# Patient Record
Sex: FEMALE | Race: WHITE | URBAN - METROPOLITAN AREA
[De-identification: names, ages, dates, MRNs, and addresses within clinical notes are randomized per-mention and may not be internally consistent; named-entity substitution may affect disease eponyms.]

---

## 2017-02-01 RX ORDER — MONTELUKAST SODIUM 10 MG/1
TABLET ORAL
Qty: 90 TAB | Refills: 1 | Status: SHIPPED | OUTPATIENT
Start: 2017-02-01 | End: 2017-08-01 | Stop reason: SDUPTHER

## 2017-02-06 NOTE — PROGRESS NOTES
PRE-VISIT PLANNING:     Future Appointments  Date Time Provider Lennie Jenseni   2017 11:00 AM Colton Dudley MD BSMA GEETA Jacome is a patient of Colton Dudley MD who is scheduled for an office visit with Colton Dudley MD on 2017 for follow up. Encounter opened prior to visit to complete pre-visit planning, update and review pertinent sections in the chart. Last office visit with PCP was 10/31/2016. Rooming Nurse Items:  -- Release for last eye exam note (glaucoma screening)    Pending items for provider to review with patient:  Health Maintenance Due   Topic Date Due    GLAUCOMA SCREENING Q2Y  2017          Internal Medicine/Primary Care  Progress Note  [employer] [facility] [address], [facility] [address] [place] [address]  Phone [phone]  Fax [phone]    Date of Service:  2017  Patient's Name: Malcom Quan   Patient's :  1945     History, Discussion & Plan     Chief Complaint   Patient presents with    Medication Evaluation        Malcom Quan is a 80 y.o. female patient who was seen today for follow up visit. She  has a past medical history of Anemia, iron deficiency secondary to bowel resection; Cervical spondylosis without myelopathy (2014); Chronic pain syndrome (2014); Collagenous colitis; Constipation; DDD (degenerative disc disease), cervical; Depression; Essential hypertension; Gastroparesis; Genital herpes (); GERD (gastroesophageal reflux disease); H/O resection of small bowel (); breast cancer; Hyperlipidemia; Osteoarthritis; Osteopenia; and Reactive airway disease. BP is well-controlled on Lisinopril 10 mg daily. Completed labs in 2016 showing good control of lipids on Zocor 10 mg nightly. WBC was slightly high in October, though within normal range.       Followed by Dr. James Weeks (pain mgmt) and Dr. Alex Augustin for iron deficiency anemia, gets periodic iron infusions. Ferritin was low. Just had 2 infusions in January, did have fatigue later that day after first infusion. Two weeks ago had second infusion. Is feeling better after past few days, no longer needing afternoon naps. Saw great carl yesterday. Has multiple concerns today. Two weeks ago also had left knee and right shoulder injections with Dr. Viktor Stone office. Had some improvement in right shoulder, but not completely relieved. Still having some intermittent left knee pain (superior aspect of patella). Has hx of meniscal tears. Has elastic knee brace. Does respond to rest.  Would like to see ortho. C/o worsening tremors in both hands over the past few months, thinks tremor started within the last year. Intermittent, but not only intentional and not only at rest.  Sometimes impacts texting and writing. Also c/o headache \"like a helmet\" intermittent for a few years, varies in intensity. Dr. Carmela Adair didn't think it was related to her neck. Had MRI head several years ago which did not show anything causative, had eval with Dr. Kimmy Avelar (neuro). Some response to Aleve. Had good response to pain patch from Dr. Dasha Ortiz for a few months, but now it no longer seems to be helping. States she had eye exam recently with Dr. Juan Nguyen, received card in mail stating it's time to schedule yearly visit. Dr. Juan Nguyen did not think vision was causing headache. No associated symptoms with HA. Ice pack helps. Has had itching on back, is overdue for follow up with dermatologist.      No longer seeing Dr. Jimi Trejo, would like refills of Valtrex through PCP office - has several refills left.       Review of Systems (positives in bold)   CONST:   fevers, chills, rigors, malaise, night sweats, fatigue    unintentional weight change, appetite change  NEURO:   headaches, auras, vision changes, seizures,     dizziness, lightheadeness, orthostasis, loss of consciousness, confusion    numbness/tingling/sensory change, focal weakness, new gait difficulty/imbalance  HEENT:  itchy eyes, runny eyes, red eyes, eye watering or discharge,     vision changes, light sensitivity, double vision    ear pain, ear pressure/popping, ear discharge/drainage, hearing change    sneezing, runny nose, nasal congestion, postnasal drip,   CV:      chest pain, palpitations, chest wall pain, orthopnea, PND, edema  PULM:  SOB, wheezing, cough, sputum production, hemoptysis  GI:             nausea, vomiting, dry heaves, abdominal pain,     diarrhea, constipation, greasy stools, blood in stool, pale stools  :       dysuria, frequency, urgency, hematuria, incontinence, flank pain  MS:      focal muscle pain, myalgias, soft tissue swelling,    focal joint pain, diffuse joint aches, joint swelling/redness,     decreased ROM, joint instability, joint crepitus    neck pain, back pain  SKIN:        rashes, itching, vesicles, pustules, drainage, cuts or sores, nonhealing wounds,     acne, rosacea, new or changing skin growths, skin tags, warts      Diagnoses & Orders:   Brittny Armendariz was seen today for follow up. BP well controlled on lisinopril. Referral to Dr. Ophelia Atkins for eval of worsening bilateral hand tremors and follow up on chronic daily headache, doubt migraine, had MRI several years ago when HAs started, also doesn't appear to be cervicogenic. Referral to ortho for multiple joint pains, lack of response to steroid injection right shoulder and left knee. Update CMP today, Follow up in 6 months for 30 min visit, sooner PRN. ICD-10-CM ICD-9-CM    1. Essential hypertension I62 161.2 METABOLIC PANEL, COMPREHENSIVE   2. Reactive airway disease, mild intermittent, uncomplicated N41.50 085.15 METABOLIC PANEL, COMPREHENSIVE   3. Mixed hyperlipidemia O49.0 815.8 METABOLIC PANEL, COMPREHENSIVE   4. Iron deficiency anemia due to chronic blood loss D50.0 280.0    5.  Medication monitoring encounter M73.01 T51.04 METABOLIC PANEL, COMPREHENSIVE   6. Intermittent tremor R25.1 781.0 REFERRAL TO NEUROLOGY   7. Daily headache R51 784.0 REFERRAL TO NEUROLOGY   8. Left hip pain M25.552 719.45 REFERRAL TO ORTHOPEDIC SURGERY   9. Chronic pain of left knee M25.562 719.46 REFERRAL TO ORTHOPEDIC SURGERY    G89.29 338.29    10. Chronic right shoulder pain M25.511 719.41 REFERRAL TO ORTHOPEDIC SURGERY    G89.29 338.29        The patient states understanding/agreement with the treatment plan. All questions were answered. Total visit time today = 37 minutes with greater than half of that time spent in face to face discussion and counseling with patient (or caregiver) regarding patient concerns and symptoms, possible causes, plan for evaluation. Elian De Leon MD - Internal Medicine  2/7/2017, 6:49 PM    Patient Care Team:  Elian De Leon MD as PCP - General (Internal Medicine)  Jeanette Borrego MD as Consulting Provider (Gastroenterology)  Jillian Munoz MD as Consulting Provider (Rheumatology)  Ovidio Joseph MD as Consulting Provider (Orthopedic Surgery)  Kesha Hernández MD as Consulting Provider (Oncology)  Harvey Cuello MD as Consulting Provider (Obstetrics & Gynecology)  Darwin Jones MD as Consulting Provider (Dermatology)  Marge Tobar MD as Consulting Provider (Pain Management)  Ava Chinchilla MD as Consulting Provider (Optometry)      Objective:       VS:    Visit Vitals    /70    Pulse 85    Temp 98.3 °F (36.8 °C) (Oral)    Resp 18    Ht 5' 3\" (1.6 m)    Wt 136 lb 6.4 oz (61.9 kg)    SpO2 100%    BMI 24.16 kg/m2       General:   Age appropriate female, seated comfortably in exam room chair    Appears well, well-nourished, well-groomed, conversant, alert, in no acute distress.      Head:  Normocephalic, atraumatic  Ears:  External ears WNL, no pain with movement of pinna    No TTP of mastoid processes    External auditory canals are clear     TMs WNL bilaterally with no bulging, or erythema, no medial effusions present  Eyes:  EOMI, PERRL, no pain with eye movements    No conjunctival injection, abnormal tearing, discharge, chemosis  Mouth:  MMM      Posterior o/p without erythema, membranes, exudates, ulcerations or petechiae  Nose:  External nares WNL  Neck:  Neck supple with normal ROM for age    No thyromegaly or nodules, no LAD  Cardiovasc:   Regular rate and rhythm, no murmurs, no rubs, no gallops  Pulmonary:   Clear breath sounds bilaterally, good air movement,    No wheezing, no rales, no rhonchi, normal respiratory effort  Abdomen:   Abdomen soft, nontender, nondistended, NABS, no masses  Extremities:   No pitting dependent edema    No tenderness with palpation of calves    Warm and well-perfused at distal extremities  Neuro:   Alert, conversant, following commands, no focal deficits. Gait is narrow based and stable without assistive device  Skin:    No rashes noted.     Psych:  Affect is pleasant and interactive, facies and mood are congruent,     behavior normal and appropriate, thought process linear and logical     Memory appears to be normal throughout interview      Additional History     Past Medical History   Diagnosis Date    Anemia, iron deficiency secondary to bowel resection      Followed by Dr Priti Hernandez, gets iron transfusions PRN, not expected to completely normalize    Cervical spondylosis without myelopathy 1/7/2014     Sees Dr. Lynsey Douglas    Chronic pain syndrome 1/7/2014     Spinal DDD and scoliosis, Sees Dr. Luis Manuel Salamanca colitis      Dr. Vianca Jaimes, using enterocort sparingly    Constipation      Dr. Vianca Jaimes    DDD (degenerative disc disease), cervical      Sees Dr. Brandt Jones hypertension      Well-controlled on meds    Gastroparesis      Resolved, was secondary to morphine    Genital herpes 1988     Pt taking meds    GERD (gastroesophageal reflux disease)      Dr. Vianca Jaimes    H/O resection of small bowel 2011     Secondary to ischemic gut from adhesions    Hx of breast cancer      Had lumpectomy, sees Dr. Toñito Corbett Hyperlipidemia      Well controlled on low dose statin    Osteoarthritis     Osteopenia      Dr. Fernando Ornelas airway disease      Wheezing after exposure to spray , has albuterol PRN     Past Surgical History   Procedure Laterality Date    Hx breast lumpectomy Right september 2007     cancerous    Hx small bowel resection  07/01/2011     Middle 1/3 of small bowel; caused by scar tissue from hysterectomy, gangrenous small bowel    Hx partial hysterectomy      Hx colonoscopy  2/21/2008     Collagenous colitis on biopsy - Dr. Trever Bueno Hx colonoscopy  2/14/2011     Diverticulosis, internal hemorrhoids - Dr. Francoise Cruz     Social History   Substance Use Topics    Smoking status: Never Smoker    Smokeless tobacco: Never Used    Alcohol use No     Current Outpatient Prescriptions   Medication Sig    montelukast (SINGULAIR) 10 mg tablet TAKE 1 TABLET BY MOUTH EVERY DAY    lisinopril (PRINIVIL, ZESTRIL) 10 mg tablet TAKE 1 TABLET BY MOUTH DAILY.  simvastatin (ZOCOR) 10 mg tablet TAKE 1 TABLET BY MOUTH NIGHTLY    gabapentin (NEURONTIN) 300 mg capsule TAKE 1 CAPSULE BY MOUTH 4 TIMES DAILY    PREDNISOLONE PO Take  by mouth.  OTHER     diclofenac (VOLTAREN) 1 % gel Apply  to affected area four (4) times daily.  fentaNYL (DURAGESIC) 25 mcg/hr PATCH 1 Patch by TransDERmal route every seventy-two (72) hours.  oxyCODONE-acetaminophen (PERCOCET) 7.5-325 mg per tablet Take  by mouth every four (4) hours as needed for Pain.  valACYclovir (VALTREX) 1 gram tablet Take  by mouth.  DULoxetine (CYMBALTA) 60 mg capsule Take 1 capsule by mouth daily.  cyclobenzaprine (FLEXERIL) 5 mg tablet Take 5 mg by mouth three (3) times daily as needed for Muscle Spasm(s).  POLYETHYLENE GLYCOL 3350 (MIRALAX PO) Take  by mouth.     etodolac (LODINE) 400 mg tablet Take  by mouth two (2) times daily (with meals).  aspirin delayed-release 81 mg tablet Take  by mouth daily.  multivitamin (ONE A DAY) tablet Take 1 Tab by mouth daily.  BIOTIN PO Take 1,000 mg by mouth daily.  cyanocobalamin (VITAMIN B12) 500 mcg tablet Take 500 mcg by mouth daily.  thiamine (VITAMIN B-1) 100 mg tablet Take  by mouth daily.  LECITHIN PO Take 400 mg by mouth two (2) times a day.  ascorbic acid (VITAMIN C) 1,000 mg tablet Take 1,000 mg by mouth three (3) times daily. No current facility-administered medications for this visit. Allergies   Allergen Reactions    Morphine Other (comments)     Gastroparesis    Neosporin [Benzalkonium Chloride] Rash     Pt reports no allergy    Penicillin G Rash    Sulfa (Sulfonamide Antibiotics) Rash            This document may have been created with the aid of dictation software. In spite of review and editing, text may contain errors, particularly phonetic errors.

## 2017-02-07 ENCOUNTER — OFFICE VISIT (OUTPATIENT)
Dept: FAMILY MEDICINE CLINIC | Age: 72
End: 2017-02-07

## 2017-02-07 ENCOUNTER — HOSPITAL ENCOUNTER (OUTPATIENT)
Dept: LAB | Age: 72
Discharge: HOME | End: 2017-02-07
Payer: MEDICARE

## 2017-02-07 VITALS
BODY MASS INDEX: 24.17 KG/M2 | HEART RATE: 85 BPM | WEIGHT: 136.4 LBS | SYSTOLIC BLOOD PRESSURE: 130 MMHG | HEIGHT: 63 IN | RESPIRATION RATE: 18 BRPM | TEMPERATURE: 98.3 F | OXYGEN SATURATION: 100 % | DIASTOLIC BLOOD PRESSURE: 70 MMHG

## 2017-02-07 DIAGNOSIS — J45.20 REACTIVE AIRWAY DISEASE, MILD INTERMITTENT, UNCOMPLICATED: Chronic | ICD-10-CM

## 2017-02-07 DIAGNOSIS — Z51.81 MEDICATION MONITORING ENCOUNTER: ICD-10-CM

## 2017-02-07 DIAGNOSIS — G89.29 CHRONIC RIGHT SHOULDER PAIN: ICD-10-CM

## 2017-02-07 DIAGNOSIS — R25.1 INTERMITTENT TREMOR: ICD-10-CM

## 2017-02-07 DIAGNOSIS — I10 ESSENTIAL HYPERTENSION: Chronic | ICD-10-CM

## 2017-02-07 DIAGNOSIS — D50.0 IRON DEFICIENCY ANEMIA DUE TO CHRONIC BLOOD LOSS: Chronic | ICD-10-CM

## 2017-02-07 DIAGNOSIS — M25.552 LEFT HIP PAIN: ICD-10-CM

## 2017-02-07 DIAGNOSIS — E78.2 MIXED HYPERLIPIDEMIA: Chronic | ICD-10-CM

## 2017-02-07 DIAGNOSIS — G89.29 CHRONIC PAIN OF LEFT KNEE: ICD-10-CM

## 2017-02-07 DIAGNOSIS — M25.511 CHRONIC RIGHT SHOULDER PAIN: ICD-10-CM

## 2017-02-07 DIAGNOSIS — I10 ESSENTIAL HYPERTENSION: Primary | Chronic | ICD-10-CM

## 2017-02-07 DIAGNOSIS — M25.562 CHRONIC PAIN OF LEFT KNEE: ICD-10-CM

## 2017-02-07 DIAGNOSIS — R51.9 DAILY HEADACHE: ICD-10-CM

## 2017-02-07 LAB
ALBUMIN SERPL BCP-MCNC: 3.7 G/DL (ref 3.4–5)
ALBUMIN/GLOB SERPL: 1.6 {RATIO} (ref 0.8–1.7)
ALP SERPL-CCNC: 73 U/L (ref 45–117)
ALT SERPL-CCNC: 32 U/L (ref 13–56)
ANION GAP BLD CALC-SCNC: 6 MMOL/L (ref 3–18)
AST SERPL W P-5'-P-CCNC: 27 U/L (ref 15–37)
BILIRUB SERPL-MCNC: 0.4 MG/DL (ref 0.2–1)
BUN SERPL-MCNC: 16 MG/DL (ref 7–18)
BUN/CREAT SERPL: 18 (ref 12–20)
CALCIUM SERPL-MCNC: 8.8 MG/DL (ref 8.5–10.1)
CHLORIDE SERPL-SCNC: 103 MMOL/L (ref 100–108)
CO2 SERPL-SCNC: 29 MMOL/L (ref 21–32)
CREAT SERPL-MCNC: 0.9 MG/DL (ref 0.6–1.3)
GLOBULIN SER CALC-MCNC: 2.3 G/DL (ref 2–4)
GLUCOSE SERPL-MCNC: 83 MG/DL (ref 74–99)
POTASSIUM SERPL-SCNC: 4.8 MMOL/L (ref 3.5–5.5)
PROT SERPL-MCNC: 6 G/DL (ref 6.4–8.2)
SODIUM SERPL-SCNC: 138 MMOL/L (ref 136–145)

## 2017-02-07 PROCEDURE — 80053 COMPREHEN METABOLIC PANEL: CPT | Performed by: INTERNAL MEDICINE

## 2017-02-07 PROCEDURE — 36415 COLL VENOUS BLD VENIPUNCTURE: CPT | Performed by: INTERNAL MEDICINE

## 2017-02-07 NOTE — PATIENT INSTRUCTIONS
STAY UP TO DATE WITH YOUR PREVENTIVE HEALTH:     Please review the following recommendations and if you are not sure that your healthcare is up to date, ask your provider at your next scheduled office visit. -- Yearly preventive visits (sometimes called \"physicals\") are recommended for all adults. Medicare and Medicaid do not pay for physicals. Medicare covers a yearly wellness visit that differs in many ways from a traditional physical, but is an opportunity to make sure you are maximizing the preventive services that will be covered by Medicare. Each insurance carrier will have different regulations about what services may be covered, so be sure to talk with your insurance provider before scheduling a visit. -- Colon cancer screening is recommended for all patients 48 and older with either colonoscopy (interval will vary depending on results) or yearly stool testing.      -- Mammogram is recommended every 2 years for women ages 36 to 76. -- Pap smear is recommended every 3-5 years for women aged 24 to 72, unless your cervix has been surgically removed for benign reasons. -- Flu shot is recommended every fall for adults of all ages. -- Prevnar 15 is recommended at the age of 72 for all adults. -- Pneumovax is recommended one year after Prevnar 13 for all adults, but earlier if you have a history of chronic health problems (including diabetes and asthma) or smoking. -- Tetanus boosters are recommended every 10 years for adults of all ages. Medicare and Medicaid do not cover tetanus as a preventive booster, but will pay for patients to receive a booster in the event of certain injuries. INSTRUCTIONS AFTER YOUR VISIT ON 2/7/2017     LAB WORK was ordered for today. Sign in for the lab at the . Results are generally reviewed within 10-14 days. LAB RESULTS can be obtained by logging into your [employer] account.   If you need assistance with accessing your account, you can call the [address] [place] [address] at #[phone]. REFERRALS - If you were referred to a specialist or consultant today, this often needs to be authorized by your insurance company before it can be scheduled. You should be contacted within 2 weeks by the consultant's office to schedule the visit. If you do not hear from the specialist's office in that time frame, please call my office and the staff here can check on the status of your referral.        TESTING RESULTS   -- Lab results may become available for your review on [employer] before your provider has an opportunity to write you a note about results. Review of routine lab results will generally be done in 10-14 days. Please save your inquiries about results until your provider has had time to review them. -- If you have testing done outside of the office, please call to let us know the date and location that your testing was completed. Results can often be obtained faster if an inquiry is run. MEDICATION REFILLS      -- Controlled substance refills (medications that require printed prescriptions for monitoring of use per [facility] guidelines) must be picked up in the office and per medical group policy will require a scheduled office visit with the provider. Calling the after hours answering service to request a controlled substance represents a violation of [facility] controlled substance policy. -- All other medication refills are to be requested by calling your pharmacy during regular office hours. The after hours physician on call is not required to respond to calls about medication refills. It is your responsibility to keep track of when you may be running out of medication and to place refill requests at least 3 business days prior. Ja      Scheduling appointments can be done at the  or by calling [phone] and selecting option #1.       HOLIDAY CLOSURES:     The office is closed on six major holidays each year:  New Year's Day, July 4th, Memorial Day, Labor Day, Thanksgiving, and Reymundo Day. Lab and X-ray services are not available on those days.

## 2017-02-07 NOTE — MR AVS SNAPSHOT
Visit Information Date & Time Provider Department Dept. Phone Encounter #  
 2/7/2017 11:00 AM Zaid Johnson, 3 [facility] [phone] [id number] Follow-up Instructions Return in about 6 months (around 8/7/2017) for 30 min visit. Upcoming Health Maintenance Date Due  
 GLAUCOMA SCREENING Q2Y 1/27/2017 DTaP/Tdap/Td series (1 - Tdap) 10/17/2017* MEDICARE YEARLY EXAM 10/18/2017 BREAST CANCER SCRN MAMMOGRAM 7/28/2018 COLONOSCOPY 1/27/2025 *Topic was postponed. The date shown is not the original due date. Allergies as of 2/7/2017  Review Complete On: 2/7/2017 By: Zaid Johnson MD  
  
 Severity Noted Reaction Type Reactions Morphine Medium 09/03/2014   Side Effect Other (comments) Gastroparesis Neosporin [Benzalkonium Chloride] Medium 09/03/2014   Intolerance Rash Pt reports no allergy Penicillin G  01/07/2014    Rash  
 Sulfa (Sulfonamide Antibiotics)  01/07/2014    Rash Current Immunizations  Reviewed on 10/17/2016 Name Date Influenza High Dose Vaccine PF 9/14/2016, 9/14/2015 Influenza Vaccine 9/15/2010 Pneumococcal Conjugate (PCV-13) 7/29/2015 11:21 AM  
 Pneumococcal Polysaccharide (PPSV-23) 10/17/2016 11:34 AM, 10/1/2008 Not reviewed this visit You Were Diagnosed With   
  
 Codes Comments Essential hypertension    -  Primary ICD-10-CM: I10 
ICD-9-CM: 401.9 Reactive airway disease, mild intermittent, uncomplicated     JKJ-82-WS: J45.20 ICD-9-CM: 493.90 Mixed hyperlipidemia     ICD-10-CM: E78.2 ICD-9-CM: 272.2 Iron deficiency anemia due to chronic blood loss     ICD-10-CM: D50.0 ICD-9-CM: 280.0 Medication monitoring encounter     ICD-10-CM: Z51.81 
ICD-9-CM: V58.83 Intermittent tremor     ICD-10-CM: R25.1 ICD-9-CM: 781.0 Daily headache     ICD-10-CM: R51 ICD-9-CM: 784.0 Left hip pain     ICD-10-CM: M25.552 ICD-9-CM: 719.45   
 Chronic pain of left knee     ICD-10-CM: M25.562, G89.29 ICD-9-CM: 719.46, 338.29 Chronic right shoulder pain     ICD-10-CM: M25.511, G89.29 ICD-9-CM: 719.41, 338.29 Vitals BP Pulse Temp Resp Height(growth percentile) Weight(growth percentile) 130/70 85 98.3 °F (36.8 °C) (Oral) 18 5' 3\" (1.6 m) 136 lb 6.4 oz (61.9 kg) SpO2 BMI OB Status Smoking Status 100% 24.16 kg/m2 Hysterectomy Never Smoker Vitals History BMI and BSA Data Body Mass Index Body Surface Area  
 24.16 kg/m 2 1.66 m 2 Preferred Pharmacy Pharmacy Name Phone [facility][id number]- [address], [address] [place] [address] [phone] Your Updated Medication List  
  
   
This list is accurate as of: 2/7/17 11:46 AM.  Always use your most recent med list.  
  
  
  
  
 aspirin delayed-release 81 mg tablet Take  by mouth daily. BIOTIN PO Take 1,000 mg by mouth daily. cyanocobalamin 500 mcg tablet Commonly known as:  VITAMIN B12 Take 500 mcg by mouth daily. DULoxetine 60 mg capsule Commonly known as:  CYMBALTA Take 1 capsule by mouth daily. etodolac 400 mg tablet Commonly known as:  LODINE Take  by mouth two (2) times daily (with meals). fentaNYL 25 mcg/hr PATCH Commonly known as:  DURAGESIC  
1 Patch by TransDERmal route every seventy-two (72) hours. FLEXERIL 5 mg tablet Generic drug:  cyclobenzaprine Take 5 mg by mouth three (3) times daily as needed for Muscle Spasm(s). gabapentin 300 mg capsule Commonly known as:  NEURONTIN  
TAKE 1 CAPSULE BY MOUTH 4 TIMES DAILY  
  
 LECITHIN PO Take 400 mg by mouth two (2) times a day. lisinopril 10 mg tablet Commonly known as:  PRINIVIL, ZESTRIL  
TAKE 1 TABLET BY MOUTH DAILY. MIRALAX PO Take  by mouth.  
  
 montelukast 10 mg tablet Commonly known as:  SINGULAIR  
TAKE 1 TABLET BY MOUTH EVERY DAY  
  
 multivitamin tablet Commonly known as:  ONE A DAY  
 Take 1 Tab by mouth daily. OTHER PERCOCET 7.5-325 mg per tablet Generic drug:  oxyCODONE-acetaminophen Take  by mouth every four (4) hours as needed for Pain. PREDNISOLONE PO Take  by mouth. simvastatin 10 mg tablet Commonly known as:  ZOCOR  
TAKE 1 TABLET BY MOUTH NIGHTLY  
  
 VALTREX 1 gram tablet Generic drug:  valACYclovir Take  by mouth. VITAMIN B-1 100 mg tablet Generic drug:  thiamine Take  by mouth daily. VITAMIN C 1,000 mg tablet Generic drug:  ascorbic acid (vitamin C) Take 1,000 mg by mouth three (3) times daily. VOLTAREN 1 % Gel Generic drug:  diclofenac Apply  to affected area four (4) times daily. We Performed the Following REFERRAL TO NEUROLOGY [OTU33 Custom] Comments:  
 Please evaluate patient for worsening bilateral hand tremors and worsening daily headache. REFERRAL TO ORTHOPEDIC SURGERY [REF62 Custom] Comments:  
 Please evaluate patient for left hip and left knee pain and right shoulder pain, recent injections ( two weeks ago) that are already losing impact. Follow-up Instructions Return in about 6 months (around 8/7/2017) for 30 min visit. To-Do List   
 02/07/2017 Lab:  METABOLIC PANEL, COMPREHENSIVE Referral Information Referral ID Referred By Referred To  
  
 [id number] Torres Alicea MD   
   [id number] [facility] [address] [facility] [address] Phone: [phone] Fax: [phone] Visits Status Start Date End Date 1 New Request 2/7/17 2/7/18 If your referral has a status of pending review or denied, additional information will be sent to support the outcome of this decision. Referral ID Referred By Referred To  
 [id number] MD Gloria Goodwin [address] Phone: [phone] Fax: [phone] Visits Status Start Date End Date 1 New Request 2/7/17 2/7/18 If your referral has a status of pending review or denied, additional information will be sent to support the outcome of this decision. Patient Instructions STAY UP TO DATE WITH YOUR PREVENTIVE HEALTH:    
Please review the following recommendations and if you are not sure that your healthcare is up to date, ask your provider at your next scheduled office visit. -- Yearly preventive visits (sometimes called \"physicals\") are recommended for all adults. Medicare and Medicaid do not pay for physicals. Medicare covers a yearly wellness visit that differs in many ways from a traditional physical, but is an opportunity to make sure you are maximizing the preventive services that will be covered by Medicare. Each insurance carrier will have different regulations about what services may be covered, so be sure to talk with your insurance provider before scheduling a visit. -- Colon cancer screening is recommended for all patients 48 and older with either colonoscopy (interval will vary depending on results) or yearly stool testing.   
 
-- Mammogram is recommended every 2 years for women ages 36 to 76. -- Pap smear is recommended every 3-5 years for women aged 24 to 72, unless your cervix has been surgically removed for benign reasons. -- Flu shot is recommended every fall for adults of all ages. -- Prevnar 15 is recommended at the age of 72 for all adults. -- Pneumovax is recommended one year after Prevnar 13 for all adults, but earlier if you have a history of chronic health problems (including diabetes and asthma) or smoking. -- Tetanus boosters are recommended every 10 years for adults of all ages. Medicare and Medicaid do not cover tetanus as a preventive booster, but will pay for patients to receive a booster in the event of certain injuries. INSTRUCTIONS AFTER YOUR VISIT ON 2/7/2017 LAB WORK was ordered for today. Sign in for the lab at the . Results are generally reviewed within 10-14 days. LAB RESULTS can be obtained by logging into your [employer] account. If you need assistance with accessing your account, you can call the [address] [place] [address] at #[phone]. REFERRALS - If you were referred to a specialist or consultant today, this often needs to be authorized by your insurance company before it can be scheduled. You should be contacted within 2 weeks by the consultant's office to schedule the visit. If you do not hear from the specialist's office in that time frame, please call my office and the staff here can check on the status of your referral.   
 
 
TESTING RESULTS  
-- Lab results may become available for your review on [employer] before your provider has an opportunity to write you a note about results. Review of routine lab results will generally be done in 10-14 days. Please save your inquiries about results until your provider has had time to review them. -- If you have testing done outside of the office, please call to let us know the date and location that your testing was completed. Results can often be obtained faster if an inquiry is run. MEDICATION REFILLS   
 
-- Controlled substance refills (medications that require printed prescriptions for monitoring of use per [facility] guidelines) must be picked up in the office and per medical group policy will require a scheduled office visit with the provider. Calling the after hours answering service to request a controlled substance represents a violation of [facility] controlled substance policy. -- All other medication refills are to be requested by calling your pharmacy during regular office hours. The after hours physician on call is not required to respond to calls about medication refills.   It is your responsibility to keep track of when you may be running out of medication and to place refill requests at least 3 business days prior. Ja Scheduling appointments can be done at the  or by calling [phone] and selecting option #1. HOLIDAY CLOSURES:  
 
The office is closed on six major holidays each year:  New Year's Day, July 4th, Memorial Day, Labor Day, Thanksgiving, and Reymundo Day. Lab and X-ray services are not available on those days. Introducing [facility]! Dear Sada Mason: Thank you for requesting a [employer] account. Our records indicate that you already have an active [employer] account. You can access your account anytime at [email or web address] Did you know that you can access your hospital and ER discharge instructions at any time in [employer] [facility]? You can also review all of your test results from your hospital stay or ER visit. Additional Information If you have questions, please visit the Frequently Asked Questions section of the [employer] website at [email or web address] Remember, [employer] is NOT to be used for urgent needs. For medical emergencies, dial 911. Now available from your iPhone and Android! Please provide this summary of care documentation to your next provider. Your primary care clinician is listed as Jasmin Pearl. If you have any questions after today's visit, please call [phone].

## 2017-02-07 NOTE — PROGRESS NOTES
Shaaron Holstein is a 80 y.o. female  Pt here today for follow up visit. 1. Have you been to the ER, urgent care clinic since your last visit? Hospitalized since your last visit? No    2. Have you seen or consulted any other health care providers outside of the [facility] since your last visit? Include any pap smears or colon screening.  Yes Where: oncology

## 2017-02-13 NOTE — PROGRESS NOTES
[employer] message to patient regarding results (see below) has been generated. Blood work shows normal kidney markers, electrolytes, fasting blood sugar level, and liver markers.

## 2017-10-11 NOTE — PROGRESS NOTES
PRE-VISIT PLANNING:     Future Appointments  Date Time Provider eLnnie Rodriges   10/12/2017 2:30 PM Brenden Haque MD [address] (PCP is Brenden Haque MD) is scheduled for an office visit with Brenden Haque MD on 10/12/2017 for follow up. Encounter opened prior to visit to complete pre-visit planning, update and review pertinent sections in the chart. Last office visit with PCP was 17. Rooming Nurse Items:  -- Offer flu shot    Pending items for provider to review with patient:    There are no preventive care reminders to display for this patient. Internal Medicine/Primary Care  Progress Note  [employer] at [place] [address] [address], [facility] Joseph Catie, [address]  Phone [phone]; Fax [phone]    Date of Service:  10/12/2017  Patient's Name: Anjel Bellamy   Patient's :  1945   Patient's Age:  71 y.o. Patient's Gender:  female     Subjective/Interim history:     Chief Complaint   Patient presents with    Hypertension     follow up        Anjel Bellamy presents for follow up. She  has a past medical history of Anemia, iron deficiency secondary to bowel resection; Cervical spondylosis without myelopathy (2014); Chronic pain syndrome (2014); Collagenous colitis; Constipation; DDD (degenerative disc disease), cervical; Depression; Essential hypertension; Gastroparesis; Genital herpes (); GERD (gastroesophageal reflux disease); H/O resection of small bowel (); breast cancer; Hyperlipidemia; Osteoarthritis; Osteopenia; and Reactive airway disease. Reports compliance with BP medications:  Lisinopril 10 mg daily. Denies CP, SOB, palpitations, leg swelling, dizziness. Acknowledges counseling regarding healthy lifestyle. Allergies/asthma stable on singulair 10 mg daily. Reports compliance with nightly cholesterol medication:  lipitor 10 mg.   Denies new muscle pain, new vision changes, CP, SOB, dizziness. Chronic shoulder issues from arthritis in right shoulder, now under control with Dr. Sage Loyd management. Not attending formal PT, but using a topical creme for pain and trying to do HEP. Has been trying to use L hand more. States she would like to tell me about her HA, though she is seeing specialists for mgmt. Still c/o chronic HA \"like a helmet on top of my head and behind my eyes\" which she states is really her only symptomatic issue and is secondary to her neck/shoulder issues. She does have significant scoliosis and right shoulder pain. DCM txs used to help, but financially felt constrained to stop treatments for the last few months. She states was seen by  [facility] (neuro) recently, followed him in the past for other issues. She states that he felt HA was cervicogenic/\"it's the neck\". Tremors present, not new or worsening. She has decided she will follow up with him only PRN. Didn't see orthopedic surgeon she had requested to see b/c she changed her mind. Continues to see pain mgmt, Dr. Mackenzie Thompson, every 2 months - still on opioid therapy which she doesn't feel is helping her. Ice packs and rest help. Awaiting issue with MRI order to be clarified. She thought MRI was to be done on her C-spine, but L-spine MRI was ordered so she didn't complete the study until she can clarify with her specialist.      Chronically fatigued. Complete review of systems negative except as noted in HPI. Diagnoses & Orders:     Diagnoses and all orders for this visit:    1. Essential hypertension  -     CBC WITH AUTOMATED DIFF; Future  -     METABOLIC PANEL, COMPREHENSIVE; Future  -     URINALYSIS W/ RFLX MICROSCOPIC; Future    2. Mild intermittent reactive airway disease without complication    3. Mixed hyperlipidemia  -     LIPID PANEL; Future    4. Chronic allergic rhinitis due to pollen, unspecified seasonality    5.  Herpes simplex infection of genitourinary system    6. Chronic pain syndrome    7. Encounter for immunization  -     Influenza virus vaccine ([place]) 72 years and older      Medical issues stable, no change to medications today. Chronic pain issues to be addressed with pain .    Follow up in 6 months for 30 min visit, sooner PRN. Body mass index is 25.15 kg/(m^2). Discussed the patient's BMI with her. I have recommended the following interventions and follow up:    1)  Weight loss from baseline weight. 2)  Minimum of 30 minutes of cardiovascular exercise daily (or 3.5 hours of cardiovascular exercise per week). Additional time doing resistance or strength training is recommended to build muscle, reduce insulin sensitivity and increase resting fat & calorie burning capacity. 3)  Limit processed carbs and starchy foods (e.g.baked goods, breads, pastas, rice, potatoes, corn, bananas)  4)  Ensure adequate protein and healthy fat intake. 5)  Adequate water intake to keep urine clear to light yellow. Follow up weight/BMI at next visit. The patient states understanding/agreement with the treatment plan. All questions were answered.     Total visit time today = 38 minutes with greater than half of that time spent in face to face discussion and counseling with patient (or caregiver) regarding patient concerns and symptoms,alarm signs and symptoms that would necessitate emergent evaluation by a licensed healthcare professional.      Lennox Jean MD - Internal Medicine  10/13/2017, 1:50 PM    Patient Care Team:  Lennox Jean MD as PCP - General (Internal Medicine)  Christopher Hernandez MD as Consulting Provider (Gastroenterology)  Juan Ashton MD as Consulting Provider (Rheumatology)  Moe Kauffman MD as Consulting Provider (Orthopedic Surgery)  Franklin Moy MD as Consulting Provider (Oncology)  Gabe Samuels MD as Consulting Provider (Obstetrics & Gynecology)  Monita Bosworth, MD as Consulting Provider (Dermatology)  Jake Sood MD as Consulting Provider (Pain Management)  Gregorio Kaur MD as Consulting Provider (Optometry)      Objective:       VS:    Visit Vitals    /60 (BP 1 Location: Right arm, BP Patient Position: Sitting)    Pulse 81    Temp 98.3 °F (36.8 °C) (Oral)    Resp 20    Ht 5' 3\" (1.6 m)    Wt 142 lb (64.4 kg)    SpO2 98%    BMI 25.15 kg/m2       General:   Well-appearing female seated comfortably in exam room chair,    Well-nourished, well-groomed, conversant, alert, in no acute distress. Psych:  Affect interactive, facies and mood are congruent,    Memory appears to be normal throughout interview  HEENT:  Normocephalic, atraumatic, MMM, PERRL, EOMI   Neck:  Neck with muscle TTP/spasm R > L, R shoulder higher than left, scoliosis present    No thyromegaly or nodules, no LAD  Cardiovasc:   Regular rate and rhythm, no murmurs, no rubs, no gallops  Pulmonary:   Clear breath sounds bilaterally, good air movement,    No wheezing, no rales, no rhonchi, normal respiratory effort  Extremities:   No pitting dependent edema    No tenderness with palpation of calves    Warm and well-perfused at distal extremities  Neuro:   Alert, conversant, following commands, no focal deficits. Ambulating with narrow based, stable gait without use of assistive device  Skin:    Warm, dry, normal pallor, no rashes noted.         Additional History     Past Medical History:   Diagnosis Date    Anemia, iron deficiency secondary to bowel resection     Followed by Dr Marques Ahmadi, gets iron transfusions PRN, not expected to completely normalize    Cervical spondylosis without myelopathy 1/7/2014    Sees Dr. Che Brennan    Chronic pain syndrome 1/7/2014    Spinal DDD and scoliosis, Sees Dr. Deidre Lebron Collagenous colitis     Dr. Joshua Beavers, using enterocort sparingly    Constipation     Dr. Joshua Beavers    DDD (degenerative disc disease), cervical     Sees Dr. Lidia Perez Essential hypertension     Well-controlled on meds    Gastroparesis     Resolved, was secondary to morphine    Genital herpes 1988    Pt taking meds    GERD (gastroesophageal reflux disease)     Dr. Scout Chang    H/O resection of small bowel 2011    Secondary to ischemic gut from adhesions    Hx of breast cancer     Had lumpectomy, sees Dr. Janet Quintana Hyperlipidemia     Well controlled on low dose statin    Osteoarthritis     Osteopenia     Dr. Colunga Marli airway disease     Wheezing after exposure to spray , has albuterol PRN     Past Surgical History:   Procedure Laterality Date    HX BREAST LUMPECTOMY Right september 2007    cancerous    HX COLONOSCOPY  2/21/2008    Collagenous colitis on biopsy - Dr. Thania Rivera HX COLONOSCOPY  2/14/2011    Diverticulosis, internal hemorrhoids - Dr. Thania Rivera [address] RESECTION  07/01/2011    Middle 1/3 of small bowel; caused by scar tissue from hysterectomy, gangrenous small bowel     Social History   Substance Use Topics    Smoking status: Never Smoker    Smokeless tobacco: Never Used    Alcohol use No     Current Outpatient Prescriptions   Medication Sig    montelukast (SINGULAIR) 10 mg tablet TAKE 1 TABLET BY MOUTH EVERY DAY    lisinopril (PRINIVIL, ZESTRIL) 10 mg tablet TAKE 1 TABLET BY MOUTH DAILY.  simvastatin (ZOCOR) 10 mg tablet TAKE 1 TABLET BY MOUTH NIGHTLY    gabapentin (NEURONTIN) 300 mg capsule TAKE 1 CAPSULE BY MOUTH 4 TIMES DAILY    PREDNISOLONE PO Take  by mouth.  OTHER     diclofenac (VOLTAREN) 1 % gel Apply  to affected area four (4) times daily.  fentaNYL (DURAGESIC) 25 mcg/hr PATCH 1 Patch by TransDERmal route every seventy-two (72) hours.  oxyCODONE-acetaminophen (PERCOCET) 7.5-325 mg per tablet Take  by mouth every four (4) hours as needed for Pain.  valACYclovir (VALTREX) 1 gram tablet Take  by mouth.     DULoxetine (CYMBALTA) 60 mg capsule Take 1 capsule by mouth daily.    cyclobenzaprine (FLEXERIL) 5 mg tablet Take 5 mg by mouth three (3) times daily as needed for Muscle Spasm(s).  POLYETHYLENE GLYCOL 3350 (MIRALAX PO) Take  by mouth.  etodolac (LODINE) 400 mg tablet Take  by mouth two (2) times daily (with meals).  aspirin delayed-release 81 mg tablet Take  by mouth daily.  multivitamin (ONE A DAY) tablet Take 1 Tab by mouth daily.  BIOTIN PO Take 1,000 mg by mouth daily.  cyanocobalamin (VITAMIN B12) 500 mcg tablet Take 500 mcg by mouth daily.  thiamine (VITAMIN B-1) 100 mg tablet Take  by mouth daily.  LECITHIN PO Take 400 mg by mouth two (2) times a day.  ascorbic acid (VITAMIN C) 1,000 mg tablet Take 1,000 mg by mouth three (3) times daily. No current facility-administered medications for this visit. Allergies   Allergen Reactions    Morphine Other (comments)     Gastroparesis    Neosporin [Benzalkonium Chloride] Rash     Pt reports no allergy    Penicillin G Rash    Sulfa (Sulfonamide Antibiotics) Rash       Encounter Diagnoses:     Encounter Diagnoses     ICD-10-CM ICD-9-CM   1. Essential hypertension I10 401.9   2. Mild intermittent reactive airway disease without complication M90.88 291.08   3. Mixed hyperlipidemia E78.2 272.2   4. Chronic allergic rhinitis due to pollen, unspecified seasonality J30.1 477.0   5. Herpes simplex infection of genitourinary system A60.00 008.69     054.79   6. Chronic pain syndrome G89.4 338.4   7. Encounter for immunization Z23 V03.89            This document may have been created with the aid of dictation software. Text may contain errors, particularly phonetic errors.

## 2017-10-11 NOTE — PATIENT INSTRUCTIONS
Check with Dr. Ortiz Adams to see if she intended to order a Lumbar MRI. Follow up in 6 months, sooner PRN. Plan to update fasting labs 1-2 weeks prior to your next appointment. TESTING RESULTS     Results of tests performed in this office will be viewable through [employer] in 10-14 days. Please be patient when awaiting routine lab testing results. Testing completed outside of the [address] [place] [address] [facility]ieu will not be viewable in [employer]. If you need assistance with accessing your [employer] account, you can call the [address] [place] [address] at #[phone]. STAY UP TO DATE WITH YOUR PREVENTIVE HEALTH:     Please review the following recommendations and if you are not sure that your healthcare is up to date, ask your provider at your next scheduled office visit. -- Yearly preventive visits (sometimes called \"physicals\") are recommended for all adults. Medicare and Medicaid do not pay for physicals. Medicare covers a yearly wellness visit that differs in many ways from a traditional physical, but is an opportunity to make sure you are maximizing the preventive services that will be covered by Medicare. Each insurance carrier will have different regulations about what services may be covered, so be sure to talk with your insurance provider before scheduling a visit. -- Colon cancer screening is recommended for all patients 48 and older with either colonoscopy (interval will vary depending on results) or yearly stool testing.      -- Mammogram is recommended every 2 years for women ages 36 to 76. -- Pap smear is recommended every 3-5 years for women aged 24 to 72, unless your cervix has been surgically removed for benign reasons. -- Flu shot is recommended every fall for adults of all ages. -- Prevnar 15 is recommended at the age of 72 for all adults.     -- Pneumovax is recommended one year after Prevnar 13 for all adults, but earlier if you have a history of chronic health problems (including diabetes and asthma) or smoking. -- Tetanus boosters are recommended every 10 years for adults of all ages. Medicare and Medicaid do not cover tetanus as a preventive booster, but will pay for patients to receive a booster in the event of certain injuries. MEDICATION REFILLS      -- Controlled substance refills must be obtained during a scheduled office visit with the provider. -- All other medication refills are to be requested by calling your pharmacy during regular office hours. Allow at least 2 business days for processing. Refills will not be provided by the after hours answering service/on call provider. HOLIDAY CLOSURES:     The office is closed on six major holidays each year:  New Year's Day, July 4th, Memorial Day, Labor Day, Thanksgiving, and Stewart Day. Lab and X-ray services are not available on those days.

## 2017-10-12 ENCOUNTER — OFFICE VISIT (OUTPATIENT)
Dept: FAMILY MEDICINE CLINIC | Age: 72
End: 2017-10-12

## 2017-10-12 VITALS
RESPIRATION RATE: 20 BRPM | HEIGHT: 63 IN | OXYGEN SATURATION: 98 % | WEIGHT: 142 LBS | SYSTOLIC BLOOD PRESSURE: 132 MMHG | TEMPERATURE: 98.3 F | HEART RATE: 81 BPM | BODY MASS INDEX: 25.16 KG/M2 | DIASTOLIC BLOOD PRESSURE: 60 MMHG

## 2017-10-12 DIAGNOSIS — J30.1 CHRONIC ALLERGIC RHINITIS DUE TO POLLEN, UNSPECIFIED SEASONALITY: Chronic | ICD-10-CM

## 2017-10-12 DIAGNOSIS — Z23 ENCOUNTER FOR IMMUNIZATION: ICD-10-CM

## 2017-10-12 DIAGNOSIS — G89.4 CHRONIC PAIN SYNDROME: Chronic | ICD-10-CM

## 2017-10-12 DIAGNOSIS — I10 ESSENTIAL HYPERTENSION: Primary | Chronic | ICD-10-CM

## 2017-10-12 DIAGNOSIS — J45.20 MILD INTERMITTENT REACTIVE AIRWAY DISEASE WITHOUT COMPLICATION: Chronic | ICD-10-CM

## 2017-10-12 DIAGNOSIS — E78.2 MIXED HYPERLIPIDEMIA: Chronic | ICD-10-CM

## 2017-10-12 DIAGNOSIS — A60.00 HERPES SIMPLEX INFECTION OF GENITOURINARY SYSTEM: Chronic | ICD-10-CM

## 2017-10-12 NOTE — PROGRESS NOTES
Radha Mom is a 71 y.o. female (: 1945) presenting to address:    Chief Complaint   Patient presents with    Hypertension     follow up       Vitals:    10/12/17 1448   BP: 132/60   Pulse: 81   Resp: 20   Temp: 98.3 °F (36.8 °C)   TempSrc: Oral   SpO2: 98%   Weight: 142 lb (64.4 kg)   Height: 5' 3\" (1.6 m)   PainSc:   5   PainLoc: Head       Hearing/Vision:   No exam data present    Learning Assessment:     Learning Assessment 10/17/2016   PRIMARY LEARNER Patient   HIGHEST LEVEL OF EDUCATION - PRIMARY LEARNER  GRADUATED HIGH SCHOOL OR GED   BARRIERS PRIMARY LEARNER NONE     NONE   CO-LEARNER CAREGIVER -   PRIMARY LANGUAGE ENGLISH    NEED -   LEARNER PREFERENCE PRIMARY DEMONSTRATION   LEARNING SPECIAL TOPICS -   ANSWERED BY patient   RELATIONSHIP SELF   ASSESSMENT COMMENT -     Depression Screening:     PHQ over the last two weeks 10/12/2017   Little interest or pleasure in doing things Not at all   Feeling down, depressed or hopeless Not at all   Total Score PHQ 2 0     Fall Risk Assessment:     Fall Risk Assessment, last 12 mths 10/12/2017   Able to walk? Yes   Fall in past 12 months? No     Abuse Screening:     Abuse Screening Questionnaire 2015   Do you ever feel afraid of your partner? N   Are you in a relationship with someone who physically or mentally threatens you? N   Is it safe for you to go home? Y     Coordination of Care Questionaire:   1. Have you been to the ER, urgent care clinic since your last visit? Hospitalized since your last visit? NO    2. Have you seen or consulted any other health care providers outside of the [facility] since your last visit? Include any pap smears or colon screening. NO    Advanced Directive:   1. Do you have an Advanced Directive? YES    2. Would you like information on Advanced Directives?  NO

## 2017-10-12 NOTE — MR AVS SNAPSHOT
Visit Information Date & Time Provider Department Dept. Phone Encounter #  
 10/12/2017  2:30 PM Robert Arriaga, [facility] [phone] [id number] Follow-up Instructions Return in about 6 months (around 4/12/2018) for Follow up (30m). Upcoming Health Maintenance Date Due DTaP/Tdap/Td series (1 - Tdap) 10/17/2017* MEDICARE YEARLY EXAM 10/18/2017 GLAUCOMA SCREENING Q2Y 2/4/2018 BREAST CANCER SCRN MAMMOGRAM 7/28/2019 COLONOSCOPY 1/27/2025 *Topic was postponed. The date shown is not the original due date. Allergies as of 10/12/2017  Review Complete On: 10/12/2017 By: Robert Arriaga MD  
  
 Severity Noted Reaction Type Reactions Morphine Medium 09/03/2014   Side Effect Other (comments) Gastroparesis Neosporin [Benzalkonium Chloride] Medium 09/03/2014   Intolerance Rash Pt reports no allergy Penicillin G  01/07/2014    Rash  
 Sulfa (Sulfonamide Antibiotics)  01/07/2014    Rash Current Immunizations  Reviewed on 10/17/2016 Name Date Influenza High Dose Vaccine PF  Incomplete, 9/14/2016, 9/14/2015 Influenza Vaccine 9/15/2010 Pneumococcal Conjugate (PCV-13) 7/29/2015 11:21 AM  
 Pneumococcal Polysaccharide (PPSV-23) 10/17/2016 11:34 AM, 10/1/2008 Not reviewed this visit You Were Diagnosed With   
  
 Codes Comments Essential hypertension    -  Primary ICD-10-CM: I10 
ICD-9-CM: 401.9 Mild intermittent reactive airway disease without complication     [facility]: J45.20 ICD-9-CM: 493.90 Mixed hyperlipidemia     ICD-10-CM: E78.2 ICD-9-CM: 272.2 Chronic allergic rhinitis due to pollen, unspecified seasonality     ICD-10-CM: J30.1 ICD-9-CM: 477.0 Herpes simplex infection of genitourinary system     ICD-10-CM: A60.00 ICD-9-CM: 008.69, 054.79 Chronic pain syndrome     ICD-10-CM: G89.4 ICD-9-CM: 338.4 Encounter for immunization     ICD-10-CM: E26 ICD-9-CM: V03.89 Vitals BP Pulse Temp Resp Height(growth percentile) Weight(growth percentile) 132/60 (BP 1 Location: Right arm, BP Patient Position: Sitting) 81 98.3 °F (36.8 °C) (Oral) 20 5' 3\" (1.6 m) 142 lb (64.4 kg) SpO2 BMI OB Status Smoking Status 98% 25.15 kg/m2 Hysterectomy Never Smoker BMI and BSA Data Body Mass Index Body Surface Area  
 25.15 kg/m 2 1.69 m 2 Preferred Pharmacy Pharmacy Name Phone [id number] [phone], [address] [facility] [phone] Your Updated Medication List  
  
   
This list is accurate as of: 10/12/17  3:46 PM.  Always use your most recent med list.  
  
  
  
  
 aspirin delayed-release 81 mg tablet Take  by mouth daily. BIOTIN PO Take 1,000 mg by mouth daily. cyanocobalamin 500 mcg tablet Commonly known as:  VITAMIN B12 Take 500 mcg by mouth daily. DULoxetine 60 mg capsule Commonly known as:  CYMBALTA Take 1 capsule by mouth daily. etodolac 400 mg tablet Commonly known as:  LODINE Take  by mouth two (2) times daily (with meals). fentaNYL 25 mcg/hr PATCH Commonly known as:  DURAGESIC  
1 Patch by TransDERmal route every seventy-two (72) hours. FLEXERIL 5 mg tablet Generic drug:  cyclobenzaprine Take 5 mg by mouth three (3) times daily as needed for Muscle Spasm(s). gabapentin 300 mg capsule Commonly known as:  NEURONTIN  
TAKE 1 CAPSULE BY MOUTH 4 TIMES DAILY  
  
 LECITHIN PO Take 400 mg by mouth two (2) times a day. lisinopril 10 mg tablet Commonly known as:  PRINIVIL, ZESTRIL  
TAKE 1 TABLET BY MOUTH DAILY. MIRALAX PO Take  by mouth.  
  
 montelukast 10 mg tablet Commonly known as:  SINGULAIR  
TAKE 1 TABLET BY MOUTH EVERY DAY  
  
 multivitamin tablet Commonly known as:  ONE A DAY Take 1 Tab by mouth daily. OTHER PERCOCET 7.5-325 mg per tablet Generic drug:  oxyCODONE-acetaminophen Take  by mouth every four (4) hours as needed for Pain. PREDNISOLONE PO Take  by mouth. simvastatin 10 mg tablet Commonly known as:  ZOCOR  
TAKE 1 TABLET BY MOUTH NIGHTLY  
  
 VALTREX 1 gram tablet Generic drug:  valACYclovir Take  by mouth. VITAMIN B-1 100 mg tablet Generic drug:  thiamine Take  by mouth daily. VITAMIN C 1,000 mg tablet Generic drug:  ascorbic acid (vitamin C) Take 1,000 mg by mouth three (3) times daily. VOLTAREN 1 % Gel Generic drug:  diclofenac Apply  to affected area four (4) times daily. We Performed the Following INFLUENZA VIRUS VACCINE, HIGH DOSE SEASONAL, PRESERVATIVE FREE [91267 CPT(R)] Follow-up Instructions Return in about 6 months (around 4/12/2018) for Follow up (30m). To-Do List   
 10/12/2017 Lab:  CBC WITH AUTOMATED DIFF   
  
 10/12/2017 Lab:  LIPID PANEL   
  
 10/12/2017 Lab:  METABOLIC PANEL, COMPREHENSIVE   
  
 10/12/2017 Lab:  URINALYSIS W/ RFLX MICROSCOPIC Patient Instructions Check with Dr. Deirdre Xie to see if she intended to order a Lumbar MRI. Follow up in 6 months, sooner PRN. Plan to update fasting labs 1-2 weeks prior to your next appointment. TESTING RESULTS Results of tests performed in this office will be viewable through [facility] in 10-14 days. Please be patient when awaiting routine lab testing results. Testing completed outside of the [address] [place] [address] [facility] will not be viewable in [facility]. If you need assistance with accessing your [employer] account, you can call the [address] [place] [address] at #[phone]. STAY UP TO DATE WITH YOUR PREVENTIVE HEALTH:    
Please review the following recommendations and if you are not sure that your healthcare is up to date, ask your provider at your next scheduled office visit.   
 
-- Yearly preventive visits (sometimes called \"physicals\") are recommended for all adults. Medicare and Medicaid do not pay for physicals. Medicare covers a yearly wellness visit that differs in many ways from a traditional physical, but is an opportunity to make sure you are maximizing the preventive services that will be covered by Medicare. Each insurance carrier will have different regulations about what services may be covered, so be sure to talk with your insurance provider before scheduling a visit. -- Colon cancer screening is recommended for all patients 48 and older with either colonoscopy (interval will vary depending on results) or yearly stool testing.   
 
-- Mammogram is recommended every 2 years for women ages 36 to 76. -- Pap smear is recommended every 3-5 years for women aged 24 to 72, unless your cervix has been surgically removed for benign reasons. -- Flu shot is recommended every fall for adults of all ages. -- Prevnar 15 is recommended at the age of 72 for all adults. -- Pneumovax is recommended one year after Prevnar 13 for all adults, but earlier if you have a history of chronic health problems (including diabetes and asthma) or smoking. -- Tetanus boosters are recommended every 10 years for adults of all ages. Medicare and Medicaid do not cover tetanus as a preventive booster, but will pay for patients to receive a booster in the event of certain injuries. MEDICATION REFILLS   
 
-- Controlled substance refills must be obtained during a scheduled office visit with the provider. -- All other medication refills are to be requested by calling your pharmacy during regular office hours. Allow at least 2 business days for processing. Refills will not be provided by the after hours answering service/on call provider. HOLIDAY CLOSURES:  
 
The office is closed on six major holidays each year:  New Year's Day, July 4th, Memorial Day, Labor Day, Thanksgiving, and Reymundo Day.    Lab and X-ray services are not available on those days. Introducing [facility]! Dear Oni Fung: Thank you for requesting a [employer] account. Our records indicate that you already have an active [employer] account. You can access your account anytime at [email or web address] Did you know that you can access your hospital and ER discharge instructions at any time in [employer]? You can also review all of your test results from your hospital stay or ER visit. Additional Information If you have questions, please visit the Frequently Asked Questions section of the [employer] website at [email or web address] Remember, [employer] is NOT to be used for urgent needs. For medical emergencies, dial 911. Now available from your iPhone and Android! Please provide this summary of care documentation to your next provider. Your primary care clinician is listed as Briana Becerril. If you have any questions after today's visit, please call [phone].

## 2017-10-13 ENCOUNTER — HOSPITAL ENCOUNTER (OUTPATIENT)
Dept: LAB | Age: 72
Discharge: HOME | End: 2017-10-13
Payer: MEDICARE

## 2017-10-13 DIAGNOSIS — I10 ESSENTIAL HYPERTENSION: Chronic | ICD-10-CM

## 2017-10-13 DIAGNOSIS — E78.2 MIXED HYPERLIPIDEMIA: Chronic | ICD-10-CM

## 2017-10-13 LAB
ALBUMIN SERPL-MCNC: 3.6 G/DL (ref 3.4–5)
ALBUMIN/GLOB SERPL: 1.5 {RATIO} (ref 0.8–1.7)
ALP SERPL-CCNC: 69 U/L (ref 45–117)
ALT SERPL-CCNC: 23 U/L (ref 13–56)
ANION GAP SERPL CALC-SCNC: 4 MMOL/L (ref 3–18)
APPEARANCE UR: ABNORMAL
AST SERPL-CCNC: 21 U/L (ref 15–37)
BASOPHILS # BLD: 0 K/UL (ref 0–0.06)
BASOPHILS NFR BLD: 0 % (ref 0–2)
BILIRUB SERPL-MCNC: 0.3 MG/DL (ref 0.2–1)
BILIRUB UR QL: NEGATIVE
BUN SERPL-MCNC: 21 MG/DL (ref 7–18)
BUN/CREAT SERPL: 24 (ref 12–20)
CALCIUM SERPL-MCNC: 8.9 MG/DL (ref 8.5–10.1)
CHLORIDE SERPL-SCNC: 104 MMOL/L (ref 100–108)
CHOLEST SERPL-MCNC: 150 MG/DL
CO2 SERPL-SCNC: 30 MMOL/L (ref 21–32)
COLOR UR: YELLOW
CREAT SERPL-MCNC: 0.89 MG/DL (ref 0.6–1.3)
DIFFERENTIAL METHOD BLD: ABNORMAL
EOSINOPHIL # BLD: 0.2 K/UL (ref 0–0.4)
EOSINOPHIL NFR BLD: 4 % (ref 0–5)
ERYTHROCYTE [DISTWIDTH] IN BLOOD BY AUTOMATED COUNT: 13.7 % (ref 11.6–14.5)
GLOBULIN SER CALC-MCNC: 2.4 G/DL (ref 2–4)
GLUCOSE SERPL-MCNC: 82 MG/DL (ref 74–99)
GLUCOSE UR STRIP.AUTO-MCNC: NEGATIVE MG/DL
HCT VFR BLD AUTO: 31.7 % (ref 35–45)
HDLC SERPL-MCNC: 92 MG/DL (ref 40–60)
HDLC SERPL: 1.6 {RATIO} (ref 0–5)
HGB BLD-MCNC: 10 G/DL (ref 12–16)
HGB UR QL STRIP: NEGATIVE
KETONES UR QL STRIP.AUTO: NEGATIVE MG/DL
LDLC SERPL CALC-MCNC: 42.8 MG/DL (ref 0–100)
LEUKOCYTE ESTERASE UR QL STRIP.AUTO: NEGATIVE
LIPID PROFILE,FLP: ABNORMAL
LYMPHOCYTES # BLD: 0.8 K/UL (ref 0.9–3.6)
LYMPHOCYTES NFR BLD: 12 % (ref 21–52)
MCH RBC QN AUTO: 29 PG (ref 24–34)
MCHC RBC AUTO-ENTMCNC: 31.5 G/DL (ref 31–37)
MCV RBC AUTO: 91.9 FL (ref 74–97)
MONOCYTES # BLD: 0.8 K/UL (ref 0.05–1.2)
MONOCYTES NFR BLD: 12 % (ref 3–10)
NEUTS SEG # BLD: 4.5 K/UL (ref 1.8–8)
NEUTS SEG NFR BLD: 72 % (ref 40–73)
NITRITE UR QL STRIP.AUTO: NEGATIVE
PH UR STRIP: >8.5 [PH] (ref 5–8)
PLATELET # BLD AUTO: 275 K/UL (ref 135–420)
PMV BLD AUTO: 11.4 FL (ref 9.2–11.8)
POTASSIUM SERPL-SCNC: 5.1 MMOL/L (ref 3.5–5.5)
PROT SERPL-MCNC: 6 G/DL (ref 6.4–8.2)
PROT UR STRIP-MCNC: NEGATIVE MG/DL
RBC # BLD AUTO: 3.45 M/UL (ref 4.2–5.3)
SODIUM SERPL-SCNC: 138 MMOL/L (ref 136–145)
SP GR UR REFRACTOMETRY: 1.02 (ref 1–1.03)
TRIGL SERPL-MCNC: 76 MG/DL (ref ?–150)
UROBILINOGEN UR QL STRIP.AUTO: 0.2 EU/DL (ref 0.2–1)
VLDLC SERPL CALC-MCNC: 15.2 MG/DL
WBC # BLD AUTO: 6.3 K/UL (ref 4.6–13.2)

## 2017-10-13 PROCEDURE — 81003 URINALYSIS AUTO W/O SCOPE: CPT | Performed by: INTERNAL MEDICINE

## 2017-10-13 PROCEDURE — 80061 LIPID PANEL: CPT | Performed by: INTERNAL MEDICINE

## 2017-10-13 PROCEDURE — 36415 COLL VENOUS BLD VENIPUNCTURE: CPT | Performed by: INTERNAL MEDICINE

## 2017-10-13 PROCEDURE — 85025 COMPLETE CBC W/AUTO DIFF WBC: CPT | Performed by: INTERNAL MEDICINE

## 2017-10-13 PROCEDURE — 80053 COMPREHEN METABOLIC PANEL: CPT | Performed by: INTERNAL MEDICINE

## 2017-10-29 NOTE — PROGRESS NOTES
Results released to Patient's MyChart with recommendations. Labs show recurrent mild anemia as expected. Otherwise labs look okay.

## 2017-10-30 RX ORDER — MONTELUKAST SODIUM 10 MG/1
TABLET ORAL
Qty: 90 TAB | Refills: 0 | Status: SHIPPED | OUTPATIENT
Start: 2017-10-30 | End: 2018-01-31 | Stop reason: SDUPTHER

## 2018-02-01 LAB — AMB DEXA, EXTERNAL: NORMAL

## 2018-02-06 RX ORDER — MONTELUKAST SODIUM 10 MG/1
TABLET ORAL
Qty: 90 TAB | Refills: 0 | Status: SHIPPED | OUTPATIENT
Start: 2018-02-06 | End: 2018-05-08 | Stop reason: SDUPTHER

## 2018-02-12 ENCOUNTER — DOCUMENTATION ONLY (OUTPATIENT)
Dept: FAMILY MEDICINE CLINIC | Age: 73
End: 2018-02-12

## 2018-02-12 ENCOUNTER — TELEPHONE (OUTPATIENT)
Dept: FAMILY MEDICINE CLINIC | Age: 73
End: 2018-02-12

## 2018-02-12 DIAGNOSIS — M85.89 OSTEOPENIA OF MULTIPLE SITES: ICD-10-CM

## 2018-02-12 NOTE — TELEPHONE ENCOUNTER
DEXA performed at [facility] 2/1/18. Please check with patient if Dr. Tremayne Dorsey has already discussed treatment recommendations with her. If not, have her schedule an office visit with me to discuss.

## 2018-04-12 PROBLEM — Z87.11 HX OF PEPTIC ULCER: Chronic | Status: ACTIVE | Noted: 2018-04-12

## 2018-04-13 ENCOUNTER — OFFICE VISIT (OUTPATIENT)
Dept: FAMILY MEDICINE CLINIC | Age: 73
End: 2018-04-13

## 2018-04-13 VITALS
RESPIRATION RATE: 20 BRPM | DIASTOLIC BLOOD PRESSURE: 68 MMHG | BODY MASS INDEX: 24.27 KG/M2 | TEMPERATURE: 97.8 F | HEART RATE: 92 BPM | HEIGHT: 63 IN | WEIGHT: 137 LBS | SYSTOLIC BLOOD PRESSURE: 110 MMHG | OXYGEN SATURATION: 94 %

## 2018-04-13 DIAGNOSIS — Z13.31 SCREENING FOR DEPRESSION: ICD-10-CM

## 2018-04-13 DIAGNOSIS — R25.1 INTERMITTENT TREMOR: ICD-10-CM

## 2018-04-13 DIAGNOSIS — M85.89 OSTEOPENIA OF MULTIPLE SITES: Chronic | ICD-10-CM

## 2018-04-13 DIAGNOSIS — R53.82 CHRONIC FATIGUE: ICD-10-CM

## 2018-04-13 DIAGNOSIS — Z00.00 MEDICARE ANNUAL WELLNESS VISIT, SUBSEQUENT: ICD-10-CM

## 2018-04-13 DIAGNOSIS — E78.2 MIXED HYPERLIPIDEMIA: Chronic | ICD-10-CM

## 2018-04-13 DIAGNOSIS — Z90.49 H/O RESECTION OF SMALL BOWEL: ICD-10-CM

## 2018-04-13 DIAGNOSIS — Z13.39 SCREENING FOR ALCOHOLISM: ICD-10-CM

## 2018-04-13 DIAGNOSIS — K31.84 GASTROPARESIS: ICD-10-CM

## 2018-04-13 DIAGNOSIS — D50.0 IRON DEFICIENCY ANEMIA DUE TO CHRONIC BLOOD LOSS: Chronic | ICD-10-CM

## 2018-04-13 DIAGNOSIS — J45.20 MILD INTERMITTENT REACTIVE AIRWAY DISEASE WITHOUT COMPLICATION: Primary | Chronic | ICD-10-CM

## 2018-04-13 DIAGNOSIS — Z71.89 ACP (ADVANCE CARE PLANNING): ICD-10-CM

## 2018-04-13 DIAGNOSIS — I10 ESSENTIAL HYPERTENSION: Chronic | ICD-10-CM

## 2018-04-13 DIAGNOSIS — G89.4 CHRONIC PAIN SYNDROME: Chronic | ICD-10-CM

## 2018-04-13 RX ORDER — LISINOPRIL 20 MG/1
20 TABLET ORAL DAILY
Qty: 90 TAB | Refills: 1 | Status: SHIPPED | OUTPATIENT
Start: 2018-04-13 | End: 2018-10-17 | Stop reason: SDUPTHER

## 2018-04-13 RX ORDER — MELOXICAM 15 MG/1
15 TABLET ORAL 2 TIMES DAILY
COMMUNITY

## 2018-04-13 RX ORDER — GABAPENTIN 300 MG/1
CAPSULE ORAL
Qty: 120 CAP | Refills: 11 | Status: SHIPPED | OUTPATIENT
Start: 2018-04-13 | End: 2019-09-13 | Stop reason: ALTCHOICE

## 2018-04-13 NOTE — PROGRESS NOTES
PRE-VISIT PLANNING:     Future Appointments  Date Time Provider Lennie Zahira   2018 11:00 AM Vaibhav Howe MD [address] (PCP is Vaibhav Howe MD) is scheduled for an office visit with Vaibhav Howe MD on 2018 for follow up, also due for [address] if time allows. Encounter opened prior to visit to complete pre-visit planning, update and review pertinent sections in the chart. Last office visit with PCP was 10/12/17. Rooming Nurse Items:  -- [facility] rooming protocol  -- Release for last glaucoma screening report (eye exam)    Pending items for provider to review with patient:  -- Last fasting labs 10/13/17  Health Maintenance Due   Topic Date Due    GLAUCOMA SCREENING Q2Y  2018    MEDICARE YEARLY EXAM  2018          Internal Medicine  Follow Up Note  [facility] [place] [address] [address], [facility], [address]  Phone [phone]; Fax [phone]    Date of Service:  2018  Patient's Name & : Paz Hudson - 1945     Assessment/Plan/Orders:       Paz Hudson is a 68 y.o. female who was seen today for follow up. The primary encounter diagnosis was Mild intermittent reactive airway disease without complication. Diagnoses of Mixed hyperlipidemia, Intermittent tremor, H/O resection of small bowel, Gastroparesis, Essential hypertension, Chronic pain syndrome, Iron deficiency anemia due to chronic blood loss, Osteopenia of multiple sites, Chronic fatigue, Medicare annual wellness visit, subsequent, Screening for alcoholism, Screening for depression, and ACP (advance care planning) were also pertinent to this visit. See separate [address] note  Continue to follow up with pain specialists  HM recommendations reviewed with patient. Body mass index is 24.27 kg/(m^2). Discussed achieving/maintaining healthy weight and BMI. Follow up BMI/weight at next visit.   The patient states understanding of recommended treatment plan. Orders Placed This Encounter    Depression Screen Annual    METABOLIC PANEL, COMPREHENSIVE    LIPID PANEL    TSH AND FREE T4    CBC WITH AUTOMATED DIFF    Annual  Alcohol Screen 15 min ()    meloxicam (MOBIC) 15 mg tablet    gabapentin (NEURONTIN) 300 mg capsule    lisinopril (PRINIVIL, ZESTRIL) 20 mg tablet       Patient Instructions     Fasting labs at your next earliest convenience    Follow up with Jaylene Trammell MD in 4 months (30m). Arrive 15 minutes prior to scheduled appointment time for check-in. Complete fasting labs 1-2 weeks prior (please call to confirm orders/lab hours, lab hours 7a-3p M-F). Call and request an earlier appointment if you have questions or concerns. A HEALTHY LIFESTYLE IS RECOMMENDED FOR EVERYONE! Your doctor recommends a lifestyle that incorporates daily exercise and a diet focused on whole, unprocessed foods. Aim to follow a diet of 2/3 non-starchy vegetables and low glycemic impact fruits and 1/3 lean proteins. Avoid processed/refined carbohydrates (especially bread products, bakery items, sugary drinks, cereals, crackers and sweets). Minimum 30 minutes of cardio and weight training/resistance exercise daily to build muscle, reduce insulin sensitivity and increase resting fat & calorie burning capacity. TESTING RESULTS   Normal results will be released to [employer] [facility] or sent by [address] mail. [address] [facility] #[phone]. Results of tests performed at outside facilities will not appear in [address] If you have questions about your results, please feel free to schedule a follow up appointment to discuss your concerns with your PCP. MEDICATION REFILLS      -- Medication refills should be requested at least 2 business days in advance through your pharmacy. Refills will not be provided by the after hours/on call provider. Medicare Wellness Visits:        The best way to stay healthy is to live a healthy lifestyle. A healthy lifestyle includes regular exercise, eating a well-balanced diet, keeping a healthy weight and not smoking. Regular preventive visits with your doctor (not to address an illness or problem, but with a goal of screening and prevention) are another important way to take care of yourself. Preventive exams provided by health care providers can find health problems early when treatment works best and can keep you from getting certain diseases or illnesses. Preventive services include exams, lab tests, screenings, shots, monitoring and information to help you take care of your own health. RECOMMENDED FOR EVERYONE:    IMMUNIZATIONS:  All people over 72 should have a pneumococcal series (Prevnar 13 at age 72 and Pneumovax 21 at age 77). All people over 65 should have a yearly flu shot. People over 65 are at medium to high risk for Hepatitis B. Three shots are needed for complete protection. Some screening tests are also recommended:    Glaucoma is an eye disease caused by high pressure in the eye. An eye exam is recommended every year. Diabetes Mellitus screening is recommended every year. Medicare covers a screening glucose (blood sugar level). If that test is abnormal, additional testing will be covered. Cardiovascular screening tests that check your cholesterol and other blood fat (lipid) levels are recommended at least every five years, more frequently if you have elevated lipid levels. Colorectal Cancer screening tests help to find pre-cancerous polyps (growths in the colon) so they can be removed before they turn into cancer. Tests ordered for screening depend on your personal and family history risk factors. ADVANCE DIRECTIVES  Consider completing an advance directive while you are feeling well and sound of mind.   I recommend discussing these documents with family members and anyone you are selecting to assist with your medical decisions in advance. I also recommend you provide me with a copy of any advance directives you may have completed so I can add it to your medical chart. A living will allows you to document your wishes concerning medical treatments at the end of life. A medical power of  (or healthcare proxy) allows you to appoint a person you trust as your healthcare agent (or surrogate decision maker), who is authorized to make medical decisions on your behalf. RECOMMENDED FOR FEMALES:    Bone mass measurement (dexa scan) is recommended every two years for women over the age of 72 and for some women at an earlier age (postmenopausal women with additional risk factors. Screening for breast cancer is recommended yearly with a Mammogram and is covered by Medicare. Screening for cervical and vaginal cancer is recommended with a pelvic and Pap test every 2-5 years depending on your risk factors. Women after the age of 72 do not need a Pap test, but still should have pelvic testing. However if you have had an abnormal pap in the past  three years or at high risk for cervical or vaginal cancer Medicare will cover a pap test and a pelvic exam every year. RECOMMENDED FOR MALES:    Prostate Cancer Screening (annually up to age 76)     5-year personalized preventive services plan:  Health Maintenance   Topic Date Due    DTaP/Tdap/Td series (1 - Tdap) 04/11/1967    GLAUCOMA SCREENING Q2Y  02/04/2018    MEDICARE YEARLY EXAM  03/14/2018    BREAST CANCER SCRN MAMMOGRAM  07/28/2019    Bone Densitometry  02/01/2021    COLONOSCOPY  01/27/2025    Hepatitis C Screening  Completed    Bone Densitometry (Dexa) Screening  Completed    ZOSTER VACCINE AGE 60>  Addressed    Pneumococcal 65+ Low/Medium Risk  Completed    Influenza Age 5 to Adult  Completed       -- Please make sure that you have reviewed the above due/overdue health maintenance/preventive items.   If you have had any of these items completed in the past, please provide my office with documentation (must include date and either report or a letter stating results/vaccine administration was completed) so that your record can be updated. This will help us avoid unnecessary reminder calls. Medicare Wellness Visit, Female    The best way to live healthy is to have a healthy lifestyle by eating a well-balanced diet, exercising regularly, limiting alcohol and stopping smoking. Regular physical exams and screening tests are another way to keep healthy. Preventive exams provided by your health care provider can find health problems before they become diseases or illnesses. Preventive services including immunizations, screening tests, monitoring and exams can help you take care of your own health. All people over age 72 should have a pneumovax  and and a prevnar shot to prevent pneumonia. These are once in a lifetime unless you and your provider decide differently. All people over 65 should have a yearly flu shot and a tetanus vaccine every 10 years. A bone mass density to screen for osteoporosis or thinning of the bones should be done every 2 years after 65. Screening for diabetes mellitus with a blood sugar test should be done every year. Glaucoma is a disease of the eye due to increased ocular pressure that can lead to blindness and it should be done every year by an eye professional.    Cardiovascular screening tests that check for elevated lipids (fatty part of blood) which can lead to heart disease and strokes should be done every 5 years. Colorectal screening that evaluates for blood or polyps in your colon should be done yearly as a stool test or every five years as a flexible sigmoidoscope or every 10 years as a colonoscopy up to age 76. Breast cancer screening with a mammogram is recommended biennially  for women age 54-69.     Screening for cervical cancer with a pap smear and pelvic exam is recommended for women after age 72 years every 2 years up to age 79 or when the provider and patient decide to stop. If there is a history of cervical abnormalities or other increased risk for cancer then the test is recommended yearly. Hepatitis C screening is also recommended for anyone born between 80 through [address]. A shingles vaccine is also recommended once in a lifetime after age 61. Your Medicare Wellness Exam is recommended annually. Here is a list of your current Health Maintenance items with a due date:  Health Maintenance Due   Topic Date Due    DTaP/Tdap/Td  (1 - Tdap) 04/11/1967    Glaucoma Screening   02/04/2018    Annual Well Visit  03/14/2018             Donato Aguero MD - Internal Medicine  4/13/2018, 8:21 PM    HPI & ROS:     Chief Complaint   Patient presents with    Hypertension    Annual Wellness Visit         Jesús Trammell presents for follow up. She  has a past medical history of Anemia, iron deficiency secondary to bowel resection; Cervical spondylosis without myelopathy (1/7/2014); Chronic pain syndrome (1/7/2014); Collagenous colitis; Constipation; DDD (degenerative disc disease), cervical; Depression; Essential hypertension; Gastroparesis; Gastroparesis; Genital herpes (1988); GERD (gastroesophageal reflux disease); H/O resection of small bowel (2011); breast cancer; Hyperlipidemia; Osteoarthritis; Osteopenia; Pain in lower jaw (9/3/2014); Peptic ulcer; and Reactive airway disease. Body mass index is 24.27 kg/(m^2). Weight is down ~5#s since last visit - overall stable for several years. Just turned 68, celebrated her bday with her . Continues to report chronic headache and jaw pain. No longer seeing DCM b/c of cost.  Also taking OTC analgesics for headache. Chronic pain managed by Dr. Familia Randall, had facet injections with Dr. Hobart Klinefelter recently which have been very helpful, has been able to reduce pain medications. Seeing Dr. Leonidas Heller for arthritis, noting right shoulder pain.   States clear gelatin made pain in right shoulder disappear, although admits facet injection may have contributed to resolution. States she couldn't do PT b/c exercises hurt too much. Had injection in hip last week for bursitis at Dr. Kenneth Tao office, thinks yard work set pain off. Pt reports iron deficiency anemia has been stable, monitored by Dr. Jas Beltre,, last checked 2-3 months. Continues to c/o chronic fatigue (at least months). States she sleeps well, but thinks she may have RLS, has had more noticeable symptoms in the past few months. Has been using TENS unit occasionally with significant improvement in feeling of restlessness in legs. Patient Active Problem List    Diagnosis    Hx of peptic ulcer    Intermittent tremor    Osteopenia     Managed by Dr. Katelyn Mcdonald @[facility]  Osteopenia on Ca+D  2/1/18: Worsening osteopenia in L spine and left hip, FRAX 10 yr fracture risk 9.9%, hip fx 1.5% --> consider treatment to reduce fracture risk, Monitor every 2-3 yrs  3/7/18:  Reclast infusion scheduled with Dr. Elisabeth Alonso Allergic rhinitis due to allergen    Reactive airway disease     Wheezing after exposure to spray , has albuterol PRN      Essential hypertension     Well-controlled on Lisinopril 20 mg PO daily       Genital herpes     since 1988, pt taking meds      Hyperlipidemia     Well controlled on low dose statin      Osteoarthritis    Hx of breast cancer     Had lumpectomy      Anemia, iron deficiency secondary to bowel resection     Followed by Dr Jas Beltre, gets iron transfusions PRN      H/O resection of small bowel     Secondary to ischemic gut from adhesions      Chronic pain syndrome     Managed by Dr. An Hernandez (pain mgmt)      Cervical spondylosis without myelopathy    Degeneration of cervical intervertebral disc    Myalgia and myositis, unspecified     Review of Systems   Constitutional: Positive for malaise/fatigue.  Negative for chills, diaphoresis, fever and weight loss. HENT: Negative. Eyes: Negative. Respiratory: Negative. Cardiovascular: Negative. Gastrointestinal: Negative. Genitourinary: Negative. Musculoskeletal: Positive for back pain, joint pain, myalgias and neck pain. Negative for falls. Skin: Negative. Neurological: Positive for tremors and headaches. Negative for dizziness, tingling, sensory change, speech change, focal weakness, seizures, loss of consciousness and weakness. Endo/Heme/Allergies: Negative. Psychiatric/Behavioral: Negative. Objective:     /68 (BP 1 Location: Left arm, BP Patient Position: Sitting)  Pulse 92  Temp 97.8 °F (36.6 °C) (Oral)   Resp 20  Ht 5' 3\" (1.6 m)  Wt 137 lb (62.1 kg)  SpO2 94%  BMI 24.27 kg/m2    Physical Exam   Constitutional: She is oriented to person, place, and time. She appears well-developed and well-nourished. HENT:   Head: Normocephalic and atraumatic. Right Ear: External ear normal.   Left Ear: External ear normal.   Nose: Nose normal.   Mouth/Throat: Oropharynx is clear and moist. No oropharyngeal exudate. Eyes: Conjunctivae and EOM are normal. Pupils are equal, round, and reactive to light. Right eye exhibits no discharge. Left eye exhibits no discharge. No scleral icterus. Neck: Normal range of motion. Neck supple. No thyromegaly present. Cardiovascular: Normal rate, regular rhythm, normal heart sounds and intact distal pulses. Exam reveals no gallop and no friction rub. No murmur heard. Pulmonary/Chest: Effort normal and breath sounds normal. No respiratory distress. She has no wheezes. She has no rales. She exhibits no tenderness. Abdominal: Soft. Bowel sounds are normal. She exhibits no distension and no mass. There is no tenderness. There is no rebound and no guarding. Musculoskeletal: Normal range of motion. She exhibits no edema, tenderness or deformity. +Scoliosis   Lymphadenopathy:     She has no cervical adenopathy. Neurological: She is alert and oriented to person, place, and time. No cranial nerve deficit. She exhibits normal muscle tone. Coordination normal.   Skin: Skin is warm and dry. No rash noted. No erythema. No pallor. Psychiatric: She has a normal mood and affect.  Her behavior is normal. Judgment and thought content normal.           Additional History     Past Medical History:   Diagnosis Date    Anemia, iron deficiency secondary to bowel resection     Followed by Dr Cassi Ortega, gets iron transfusions PRN, not expected to completely normalize    Cervical spondylosis without myelopathy 1/7/2014    Sees Dr. Melanie Peralta    Chronic pain syndrome 1/7/2014    Spinal DDD and scoliosis, Sees Dr. Joaquin Santana Collagenous colitis     Dr. Rosie Waldron, using enterocort sparingly    Constipation     Dr. Bashir Raymond DDD (degenerative disc disease), cervical     Sees Dr. Jenny Huerta hypertension     Well-controlled on meds    Gastroparesis     Resolved, was secondary to morphine    Gastroparesis     Resolved, was secondary to morphine     Genital herpes 1988    Pt taking meds    GERD (gastroesophageal reflux disease)     Dr. Rosie Waldron    H/O resection of small bowel 2011    Secondary to ischemic gut from adhesions    Hx of breast cancer     Had lumpectomy, sees Dr. Guillaume Manuel Hyperlipidemia     Well controlled on low dose statin    Osteoarthritis     Osteopenia     Dr. Adriane Maldonado    Pain in lower jaw 9/3/2014    -- Dr. Meño Wolff (dentist)    Laura Contreras Peptic ulcer     taking meds     Reactive airway disease     Wheezing after exposure to spray , has albuterol PRN     Past Surgical History:   Procedure Laterality Date    HX BREAST LUMPECTOMY Right september 2007    cancerous    HX COLONOSCOPY  2/21/2008    Collagenous colitis on biopsy - Dr. Servando Umaña HX COLONOSCOPY  2/14/2011    Diverticulosis, internal hemorrhoids - Dr. Brigitte Sawyer BOWEL RESECTION  07/01/2011    Middle 1/3 of small bowel; caused by scar tissue from hysterectomy, gangrenous small bowel     Social History   Substance Use Topics    Smoking status: Never Smoker    Smokeless tobacco: Never Used    Alcohol use No     Current Outpatient Prescriptions   Medication Sig    meloxicam (MOBIC) 15 mg tablet Take 15 mg by mouth two (2) times a day.  gabapentin (NEURONTIN) 300 mg capsule TAKE 1 CAPSULE BY MOUTH 4 TIMES DAILY    lisinopril (PRINIVIL, ZESTRIL) 20 mg tablet Take 1 Tab by mouth daily.  simvastatin (ZOCOR) 10 mg tablet TAKE 1 TABLET BY MOUTH NIGHTLY    montelukast (SINGULAIR) 10 mg tablet TAKE 1 TABLET BY MOUTH EVERY DAY    OTHER     diclofenac (VOLTAREN) 1 % gel Apply  to affected area four (4) times daily.  fentaNYL (DURAGESIC) 25 mcg/hr PATCH 1 Patch by TransDERmal route every seventy-two (72) hours.  valACYclovir (VALTREX) 1 gram tablet Take  by mouth.  DULoxetine (CYMBALTA) 60 mg capsule Take 1 capsule by mouth daily.  cyclobenzaprine (FLEXERIL) 5 mg tablet Take 5 mg by mouth three (3) times daily as needed for Muscle Spasm(s).  aspirin delayed-release 81 mg tablet Take  by mouth daily.  multivitamin (ONE A DAY) tablet Take 1 Tab by mouth daily.  BIOTIN PO Take 1,000 mg by mouth daily.  cyanocobalamin (VITAMIN B12) 500 mcg tablet Take 500 mcg by mouth daily.  LECITHIN PO Take 400 mg by mouth two (2) times a day.  ascorbic acid (VITAMIN C) 1,000 mg tablet Take 1,000 mg by mouth three (3) times daily. No current facility-administered medications for this visit. Allergies   Allergen Reactions    Morphine Other (comments)     Gastroparesis    Neosporin [Benzalkonium Chloride] Rash     Pt reports no allergy    Penicillin G Rash    Sulfa (Sulfonamide Antibiotics) Rash       Encounter Diagnoses:     Encounter Diagnoses     ICD-10-CM ICD-9-CM   1. Mild intermittent reactive airway disease without complication X24.61 795.25   2. Mixed hyperlipidemia E78.2 272.2   3. Intermittent tremor R25.1 781.0   4. H/O resection of small bowel Z90.49 V15.29   5. Gastroparesis K31.84 536.3   6. Essential hypertension I10 401.9   7. Chronic pain syndrome G89.4 338.4   8. Iron deficiency anemia due to chronic blood loss D50.0 280.0   9. Osteopenia of multiple sites M85.89 733.90   10. Chronic fatigue R53.82 780.79   11. Medicare annual wellness visit, subsequent Z00.00 V70.0   12. Screening for alcoholism Z13.89 V79.1   13. Screening for depression Z13.89 V79.0   14. ACP (advance care planning) Z71.89 V65.49            This document may have been created with the aid of dictation software.   Text may contain errors, particularly phonetic errors

## 2018-04-13 NOTE — PROGRESS NOTES
Internal Medicine  Subsequent Annual Wellness Visit (AWV)   [facility] [place]  [address], [place], [place], [address]  Phone [phone]; Fax [phone]    Date of Service:  2018  Patient's Name & :   Juaquin Pang - [birth year]   Patient's PCP:  Dina Llanes MD     HPI   Juaquin Pang is a 68 y.o. patient who was seen today for a Welcome to Medicare visit. She  has a past medical history of Anemia, iron deficiency secondary to bowel resection; Cervical spondylosis without myelopathy (2014); Chronic pain syndrome (2014); Collagenous colitis; Constipation; DDD (degenerative disc disease), cervical; Depression; Essential hypertension; Gastroparesis; Genital herpes (); GERD (gastroesophageal reflux disease); H/O resection of small bowel (); breast cancer; Hyperlipidemia; Osteoarthritis; Osteopenia; Pain in lower jaw (9/3/2014); Peptic ulcer; and Reactive airway disease. .  See separate E&M note from 2018 for ROS, PhyExam, PMHx, PSHx, Allergies, Meds. Had eye exam with Dr. Jana Guillermo recently. Health Maintenance Status     Health Maintenance   Topic Date Due    GLAUCOMA SCREENING Q2Y  2018    MEDICARE YEARLY EXAM  2018    DTaP/Tdap/Td series (1 - Tdap) 2019 (Originally 1967)    BREAST CANCER SCRN MAMMOGRAM  2019    Bone Densitometry  2021    COLONOSCOPY  2025    Hepatitis C Screening  Completed    Bone Densitometry (Dexa) Screening  Completed    ZOSTER VACCINE AGE 60>  Addressed    Pneumococcal 65+ Low/Medium Risk  Completed    Influenza Age 5 to Adult  Completed       Assessment, Orders   Education and counseling provided regarding age, gender and functional status appropriate HM and screening issues -  discussed with the patient, including CMS covered intervals for screening.   Written 5-year personalized preventive services plan and information about advance care planning (ACP) provided to patient today. Orders Placed This Encounter    Depression Screen Annual    METABOLIC PANEL, COMPREHENSIVE    LIPID PANEL    TSH AND FREE T4    CBC WITH AUTOMATED DIFF    Annual  Alcohol Screen 15 min ()    meloxicam (MOBIC) 15 mg tablet    gabapentin (NEURONTIN) 300 mg capsule    lisinopril (PRINIVIL, ZESTRIL) 20 mg tablet       Caren Rice MD   Internal Medicine  4/13/2018, 11:11 AM    I have reviewed the patient's medical history and current medications in detail and updated the computerized patient record. 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 Children live locally. Diet, Lifestyle: generally follows a low fat low cholesterol diet     Depression Risk Screen     PHQ over the last two weeks 4/13/2018   Little interest or pleasure in doing things Not at all   Feeling down, depressed or hopeless Not at all   Total Score PHQ 2 0       Alcohol Risk Screen   You do not drink alcohol or very rarely. Functional Ability and Level of Safety      Hearing Screening    125Hz 250Hz 500Hz 1000Hz 2000Hz 3000Hz 4000Hz 6000Hz 8000Hz   Right ear:    Fail 40 40  40     Left ear:   40 40 40  40     Vision Screening Comments: Eye exam 2 months ago    Hearing Loss   Patient feels hearing is very good, declines audiology evaluation    Activities of Daily Living   Self-care     Fall Risk Screen     Fall Risk Assessment, last 12 mths 4/13/2018 Able to walk? Yes   Fall in past 12 months? No       Abuse Screen   Patient is not abused    Advance Care Planning - 04/13/2018   See ACP note.

## 2018-04-13 NOTE — PROGRESS NOTES
Paz Hudson is a 68 y.o. female (: 1945) presenting to address:    Chief Complaint   Patient presents with    Hypertension    Annual Wellness Visit       Vitals:    18 1058   BP: 110/68   Pulse: 92   Resp: 20   Temp: 97.8 °F (36.6 °C)   TempSrc: Oral   SpO2: 94%   Weight: 137 lb (62.1 kg)   Height: 5' 3\" (1.6 m)   PainSc:   3   PainLoc: Head       Hearing/Vision:   No exam data present    Learning Assessment:     Learning Assessment 10/17/2016   PRIMARY LEARNER Patient   HIGHEST LEVEL OF EDUCATION - PRIMARY LEARNER  GRADUATED HIGH SCHOOL OR GED   BARRIERS PRIMARY LEARNER NONE     NONE   CO-LEARNER CAREGIVER -   PRIMARY LANGUAGE ENGLISH    NEED -   LEARNER PREFERENCE PRIMARY DEMONSTRATION   LEARNING SPECIAL TOPICS -   ANSWERED BY patient   RELATIONSHIP SELF   ASSESSMENT COMMENT -     Depression Screening:     PHQ over the last two weeks 2018   Little interest or pleasure in doing things Not at all   Feeling down, depressed or hopeless Not at all   Total Score PHQ 2 0     Fall Risk Assessment:     Fall Risk Assessment, last 12 mths 2018   Able to walk? Yes   Fall in past 12 months? No     Abuse Screening:     Abuse Screening Questionnaire 2018   Do you ever feel afraid of your partner? N   Are you in a relationship with someone who physically or mentally threatens you? N   Is it safe for you to go home? Y     Coordination of Care Questionaire:   1. Have you been to the ER, urgent care clinic since your last visit? Hospitalized since your last visit? NO    2. Have you seen or consulted any other health care providers outside of the [facility] since your last visit? Include any pap smears or colon screening. YES  Pain management    Advanced Directive:   1. Do you have an Advanced Directive? YES    2. Would you like information on Advanced Directives?  NO

## 2018-04-13 NOTE — MR AVS SNAPSHOT
[address]  [address] [address]77 [facility] [id number] [phone] Patient: Willie Hernández MRN: [id number] Visit Information Date & Time Provider Department Dept. Phone Encounter #  
 4/13/2018 11:00 AM Daly Olmos, [employer] [phone] [id number] Upcoming Health Maintenance Date Due DTaP/Tdap/Td series (1 - Tdap) 4/11/1967 GLAUCOMA SCREENING Q2Y 2/4/2018 MEDICARE YEARLY EXAM 3/14/2018 BREAST CANCER SCRN MAMMOGRAM 7/28/2019 Bone Densitometry 2/1/2021 COLONOSCOPY 1/27/2025 Allergies as of 4/13/2018  Review Complete On: 4/13/2018 By: Young Jimenez LPN Severity Noted Reaction Type Reactions Morphine Medium 09/03/2014   Side Effect Other (comments) Gastroparesis Neosporin [Benzalkonium Chloride] Medium 09/03/2014   Intolerance Rash Pt reports no allergy Penicillin G  01/07/2014    Rash  
 Sulfa (Sulfonamide Antibiotics)  01/07/2014    Rash Current Immunizations  Reviewed on 10/17/2016 Name Date Influenza High Dose Vaccine PF 10/12/2017, 9/14/2016, 9/14/2015 Influenza Vaccine 9/15/2010 Pneumococcal Conjugate (PCV-13) 7/29/2015 11:21 AM  
 Pneumococcal Polysaccharide (PPSV-23) 10/17/2016 11:34 AM, 10/1/2008 Not reviewed this visit You Were Diagnosed With   
  
 Codes Comments Mild intermittent reactive airway disease without complication    -  Primary ICD-10-CM: J45.20 ICD-9-CM: 493.90 Mixed hyperlipidemia     ICD-10-CM: E78.2 ICD-9-CM: 272.2 Intermittent tremor     ICD-10-CM: R25.1 ICD-9-CM: 781.0 H/O resection of small bowel     ICD-10-CM: Z90.49 ICD-9-CM: V15.29 Gastroparesis     ICD-10-CM: K31.84 ICD-9-CM: 536.3 Essential hypertension     ICD-10-CM: I10 
ICD-9-CM: 401.9 Chronic pain syndrome     ICD-10-CM: G89.4 ICD-9-CM: 338.4 Iron deficiency anemia due to chronic blood loss     ICD-10-CM: D50.0 ICD-9-CM: 280.0 Osteopenia of multiple sites     ICD-10-CM: M85.89 ICD-9-CM: 733.90 Chronic fatigue     ICD-10-CM: R53.82 
ICD-9-CM: 780.79 Vitals BP Pulse Temp Resp Height(growth percentile) Weight(growth percentile) 110/68 (BP 1 Location: Left arm, BP Patient Position: Sitting) 92 97.8 °F (36.6 °C) (Oral) 20 5' 3\" (1.6 m) 137 lb (62.1 kg) SpO2 BMI OB Status Smoking Status 94% 24.27 kg/m2 Hysterectomy Never Smoker Vitals History BMI and BSA Data Body Mass Index Body Surface Area  
 24.27 kg/m 2 1.66 m 2 Preferred Pharmacy Pharmacy Name Phone [phone], [address] [phone] Your Updated Medication List  
  
   
This list is accurate as of 4/13/18 11:40 AM.  Always use your most recent med list.  
  
  
  
  
 aspirin delayed-release 81 mg tablet Take  by mouth daily. BIOTIN PO Take 1,000 mg by mouth daily. cyanocobalamin 500 mcg tablet Commonly known as:  VITAMIN B12 Take 500 mcg by mouth daily. DULoxetine 60 mg capsule Commonly known as:  CYMBALTA Take 1 capsule by mouth daily. fentaNYL 25 mcg/hr PATCH Commonly known as:  DURAGESIC  
1 Patch by TransDERmal route every seventy-two (72) hours. FLEXERIL 5 mg tablet Generic drug:  cyclobenzaprine Take 5 mg by mouth three (3) times daily as needed for Muscle Spasm(s). gabapentin 300 mg capsule Commonly known as:  NEURONTIN  
TAKE 1 CAPSULE BY MOUTH 4 TIMES DAILY  
  
 LECITHIN PO Take 400 mg by mouth two (2) times a day. lisinopril 20 mg tablet Commonly known as:  Mechele Livings Take 1 Tab by mouth daily. MOBIC 15 mg tablet Generic drug:  meloxicam  
Take 15 mg by mouth two (2) times a day. montelukast 10 mg tablet Commonly known as:  SINGULAIR  
TAKE 1 TABLET BY MOUTH EVERY DAY  
  
 multivitamin tablet Commonly known as:  ONE A DAY Take 1 Tab by mouth daily. OTHER  
  
 simvastatin 10 mg tablet Commonly known as:  ZOCOR  
TAKE 1 TABLET BY MOUTH NIGHTLY  
  
 VALTREX 1 gram tablet Generic drug:  valACYclovir Take  by mouth. VITAMIN C 1,000 mg tablet Generic drug:  ascorbic acid (vitamin C) Take 1,000 mg by mouth three (3) times daily. VOLTAREN 1 % Gel Generic drug:  diclofenac Apply  to affected area four (4) times daily. Prescriptions Sent to Pharmacy Refills  
 gabapentin (NEURONTIN) 300 mg capsule 11 Sig: TAKE 1 CAPSULE BY MOUTH 4 TIMES DAILY Class: Normal  
 Pharmacy: [address] [place] [address]. Ph #: [phone]  
 lisinopril (PRINIVIL, ZESTRIL) 20 mg tablet 1 Sig: Take 1 Tab by mouth daily. Class: Normal  
 Pharmacy: [address] [place] [address]. Ph #: [phone] Route: Oral  
  
To-Do List   
 04/13/2018 Lab:  CBC WITH AUTOMATED DIFF   
  
 04/13/2018 Lab:  LIPID PANEL   
  
 04/13/2018 Lab:  METABOLIC PANEL, COMPREHENSIVE   
  
 04/13/2018 Lab:  TSH AND FREE T4 Patient Instructions Fasting labs at your next earliest convenience Follow up with Jaylene Trammell MD in 4 months (30m). Arrive 15 minutes prior to scheduled appointment time for check-in. Complete fasting labs 1-2 weeks prior (please call to confirm orders/lab hours, lab hours 7a-3p M-F). Call and request an earlier appointment if you have questions or concerns. A HEALTHY LIFESTYLE IS RECOMMENDED FOR EVERYONE! Your doctor recommends a lifestyle that incorporates daily exercise and a diet focused on whole, unprocessed foods. Aim to follow a diet of 2/3 non-starchy vegetables and low glycemic impact fruits and 1/3 lean proteins.   Avoid processed/refined carbohydrates (especially bread products, bakery items, sugary drinks, cereals, crackers and sweets). Minimum 30 minutes of cardio and weight training/resistance exercise daily to build muscle, reduce insulin sensitivity and increase resting fat & calorie burning capacity. TESTING RESULTS Normal results will be released to [employer] or sent by [address] mail. [address] [facility] #[phone]. Results of tests performed at outside facilities will not appear in [address] If you have questions about your results, please feel free to schedule a follow up appointment to discuss your concerns with your PCP. MEDICATION REFILLS   
 
-- Medication refills should be requested at least 2 business days in advance through your pharmacy. Refills will not be provided by the after hours/on call provider. Introducing [facility]! Dear Jodi Cline: Thank you for requesting a [employer]opic[employer] account. Our records indicate that you already have an active Topicmarks account. You can access your account anytime at [email or web address] Did you know that you can access your hospital and ER discharge instructions at any time in [employer]? You can also review all of your test results from your hospital stay or ER visit. Additional Information If you have questions, please visit the Frequently Asked Questions section of the [employer] website at [email or web address] Remember, T[employer] is NOT to be used for urgent needs. For medical emergencies, dial 911. Now available from your iPhone and Android! Please provide this summary of care documentation to your next provider. Your primary care clinician is listed as Jeffrey Avelar. If you have any questions after today's visit, please call [phone].

## 2018-04-13 NOTE — PATIENT INSTRUCTIONS
Fasting labs at your next earliest convenience    Follow up with Lyle Pugh MD in 4 months (30m). Arrive 15 minutes prior to scheduled appointment time for check-in. Complete fasting labs 1-2 weeks prior (please call to confirm orders/lab hours, lab hours 7a-3p M-F). Call and request an earlier appointment if you have questions or concerns. A HEALTHY LIFESTYLE IS RECOMMENDED FOR EVERYONE! Your doctor recommends a lifestyle that incorporates daily exercise and a diet focused on whole, unprocessed foods. Aim to follow a diet of 2/3 non-starchy vegetables and low glycemic impact fruits and 1/3 lean proteins. Avoid processed/refined carbohydrates (especially bread products, bakery items, sugary drinks, cereals, crackers and sweets). Minimum 30 minutes of cardio and weight training/resistance exercise daily to build muscle, reduce insulin sensitivity and increase resting fat & calorie burning capacity. TESTING RESULTS   Normal results will be released to [employer] or sent by [address] mail. [address] [facility] #[phone]. Results of tests performed at outside facilities will not appear in [address] If you have questions about your results, please feel free to schedule a follow up appointment to discuss your concerns with your PCP. MEDICATION REFILLS      -- Medication refills should be requested at least 2 business days in advance through your pharmacy. Refills will not be provided by the after hours/on call provider. Medicare Wellness Visits: The best way to stay healthy is to live a healthy lifestyle. A healthy lifestyle includes regular exercise, eating a well-balanced diet, keeping a healthy weight and not smoking. Regular preventive visits with your doctor (not to address an illness or problem, but with a goal of screening and prevention) are another important way to take care of yourself.      Preventive exams provided by health care providers can find health problems early when treatment works best and can keep you from getting certain diseases or illnesses. Preventive services include exams, lab tests, screenings, shots, monitoring and information to help you take care of your own health. RECOMMENDED FOR EVERYONE:    IMMUNIZATIONS:  All people over 72 should have a pneumococcal series (Prevnar 13 at age 72 and Pneumovax 21 at age 77). All people over 65 should have a yearly flu shot. People over 65 are at medium to high risk for Hepatitis B. Three shots are needed for complete protection. Some screening tests are also recommended:    Glaucoma is an eye disease caused by high pressure in the eye. An eye exam is recommended every year. Diabetes Mellitus screening is recommended every year. Medicare covers a screening glucose (blood sugar level). If that test is abnormal, additional testing will be covered. Cardiovascular screening tests that check your cholesterol and other blood fat (lipid) levels are recommended at least every five years, more frequently if you have elevated lipid levels. Colorectal Cancer screening tests help to find pre-cancerous polyps (growths in the colon) so they can be removed before they turn into cancer. Tests ordered for screening depend on your personal and family history risk factors. ADVANCE DIRECTIVES  Consider completing an advance directive while you are feeling well and sound of mind. I recommend discussing these documents with family members and anyone you are selecting to assist with your medical decisions in advance. I also recommend you provide me with a copy of any advance directives you may have completed so I can add it to your medical chart. A living will allows you to document your wishes concerning medical treatments at the end of life.    A medical power of  (or healthcare proxy) allows you to appoint a person you trust as your healthcare agent (or surrogate decision maker), who is authorized to make medical decisions on your behalf. RECOMMENDED FOR FEMALES:    Bone mass measurement (dexa scan) is recommended every two years for women over the age of 72 and for some women at an earlier age (postmenopausal women with additional risk factors. Screening for breast cancer is recommended yearly with a Mammogram and is covered by Medicare. Screening for cervical and vaginal cancer is recommended with a pelvic and Pap test every 2-5 years depending on your risk factors. Women after the age of 72 do not need a Pap test, but still should have pelvic testing. However if you have had an abnormal pap in the past  three years or at high risk for cervical or vaginal cancer Medicare will cover a pap test and a pelvic exam every year. RECOMMENDED FOR MALES:    Prostate Cancer Screening (annually up to age 76)     5-year personalized preventive services plan:  Health Maintenance   Topic Date Due    DTaP/Tdap/Td series (1 - Tdap) 04/11/1967    GLAUCOMA SCREENING Q2Y  02/04/2018    MEDICARE YEARLY EXAM  03/14/2018    BREAST CANCER SCRN MAMMOGRAM  07/28/2019    Bone Densitometry  02/01/2021    COLONOSCOPY  01/27/2025    Hepatitis C Screening  Completed    Bone Densitometry (Dexa) Screening  Completed    ZOSTER VACCINE AGE 60>  Addressed    Pneumococcal 65+ Low/Medium Risk  Completed    Influenza Age 5 to Adult  Completed       -- Please make sure that you have reviewed the above due/overdue health maintenance/preventive items. If you have had any of these items completed in the past, please provide my office with documentation (must include date and either report or a letter stating results/vaccine administration was completed) so that your record can be updated. This will help us avoid unnecessary reminder calls.            Medicare Wellness Visit, Female    The best way to live healthy is to have a healthy lifestyle by eating a well-balanced diet, exercising regularly, limiting alcohol and stopping smoking. Regular physical exams and screening tests are another way to keep healthy. Preventive exams provided by your health care provider can find health problems before they become diseases or illnesses. Preventive services including immunizations, screening tests, monitoring and exams can help you take care of your own health. All people over age 72 should have a pneumovax  and and a prevnar shot to prevent pneumonia. These are once in a lifetime unless you and your provider decide differently. All people over 65 should have a yearly flu shot and a tetanus vaccine every 10 years. A bone mass density to screen for osteoporosis or thinning of the bones should be done every 2 years after 65. Screening for diabetes mellitus with a blood sugar test should be done every year. Glaucoma is a disease of the eye due to increased ocular pressure that can lead to blindness and it should be done every year by an eye professional.    Cardiovascular screening tests that check for elevated lipids (fatty part of blood) which can lead to heart disease and strokes should be done every 5 years. Colorectal screening that evaluates for blood or polyps in your colon should be done yearly as a stool test or every five years as a flexible sigmoidoscope or every 10 years as a colonoscopy up to age 76. Breast cancer screening with a mammogram is recommended biennially  for women age 54-69. Screening for cervical cancer with a pap smear and pelvic exam is recommended for women after age 72 years every 2 years up to age 79 or when the provider and patient decide to stop. If there is a history of cervical abnormalities or other increased risk for cancer then the test is recommended yearly. Hepatitis C screening is also recommended for anyone born between [birth year] through [address]. A shingles vaccine is also recommended once in a lifetime after age 61.     Your Medicare Wellness Exam is recommended annually.     Here is a list of your current Health Maintenance items with a due date:  Health Maintenance Due   Topic Date Due    DTaP/Tdap/Td  (1 - Tdap) 04/11/1967    Glaucoma Screening   02/04/2018    Annual Well Visit  03/14/2018

## 2018-04-13 NOTE — ACP (ADVANCE CARE PLANNING)
Date of ACP Conversation:  04/13/2018       Persons included in Conversation:  Patient     Length of ACP Conversation in minutes:  <16 minutes (Non-Billable)         Discussed advance care planning documents/advance care directives with patient today including exploration of values, goals, and preferences if recovery is not expected, even with continued medical treatment (such as in the event of:  Imminent death, Severe/permanent brain injury). In those circumstances, patient states what matters most to them is:  Care focused more on comfort or quality of life. Patient has already completed an ACP document, which she states is on file with [facility]. [facility] EMR reviewed, no ACP on file. Requested patient bring a copy of written Advance Directive to office for review by PCP and addition to medical chart.

## 2018-04-19 ENCOUNTER — HOSPITAL ENCOUNTER (OUTPATIENT)
Dept: LAB | Age: 73
Discharge: HOME | End: 2018-04-19
Payer: MEDICARE

## 2018-04-19 DIAGNOSIS — D50.0 IRON DEFICIENCY ANEMIA DUE TO CHRONIC BLOOD LOSS: Chronic | ICD-10-CM

## 2018-04-19 DIAGNOSIS — R25.1 INTERMITTENT TREMOR: ICD-10-CM

## 2018-04-19 DIAGNOSIS — E78.2 MIXED HYPERLIPIDEMIA: Chronic | ICD-10-CM

## 2018-04-19 DIAGNOSIS — I10 ESSENTIAL HYPERTENSION: Chronic | ICD-10-CM

## 2018-04-19 DIAGNOSIS — R53.82 CHRONIC FATIGUE: ICD-10-CM

## 2018-04-19 LAB
ALBUMIN SERPL-MCNC: 3.9 G/DL (ref 3.4–5)
ALBUMIN/GLOB SERPL: 1.4 {RATIO} (ref 0.8–1.7)
ALP SERPL-CCNC: 55 U/L (ref 45–117)
ALT SERPL-CCNC: 24 U/L (ref 13–56)
ANION GAP SERPL CALC-SCNC: 6 MMOL/L (ref 3–18)
AST SERPL-CCNC: 19 U/L (ref 15–37)
BASOPHILS # BLD: 0 K/UL (ref 0–0.06)
BASOPHILS NFR BLD: 1 % (ref 0–2)
BILIRUB SERPL-MCNC: 0.3 MG/DL (ref 0.2–1)
BUN SERPL-MCNC: 17 MG/DL (ref 7–18)
BUN/CREAT SERPL: 18 (ref 12–20)
CALCIUM SERPL-MCNC: 8.8 MG/DL (ref 8.5–10.1)
CHLORIDE SERPL-SCNC: 103 MMOL/L (ref 100–108)
CHOLEST SERPL-MCNC: 196 MG/DL
CO2 SERPL-SCNC: 30 MMOL/L (ref 21–32)
CREAT SERPL-MCNC: 0.92 MG/DL (ref 0.6–1.3)
DIFFERENTIAL METHOD BLD: ABNORMAL
EOSINOPHIL # BLD: 0.1 K/UL (ref 0–0.4)
EOSINOPHIL NFR BLD: 2 % (ref 0–5)
ERYTHROCYTE [DISTWIDTH] IN BLOOD BY AUTOMATED COUNT: 15.2 % (ref 11.6–14.5)
GLOBULIN SER CALC-MCNC: 2.7 G/DL (ref 2–4)
GLUCOSE SERPL-MCNC: 77 MG/DL (ref 74–99)
HCT VFR BLD AUTO: 36.5 % (ref 35–45)
HDLC SERPL-MCNC: 104 MG/DL (ref 40–60)
HDLC SERPL: 1.9 {RATIO} (ref 0–5)
HGB BLD-MCNC: 11.5 G/DL (ref 12–16)
LDLC SERPL CALC-MCNC: 77.2 MG/DL (ref 0–100)
LIPID PROFILE,FLP: ABNORMAL
LYMPHOCYTES # BLD: 1.6 K/UL (ref 0.9–3.6)
LYMPHOCYTES NFR BLD: 26 % (ref 21–52)
MCH RBC QN AUTO: 29.6 PG (ref 24–34)
MCHC RBC AUTO-ENTMCNC: 31.5 G/DL (ref 31–37)
MCV RBC AUTO: 94.1 FL (ref 74–97)
MONOCYTES # BLD: 0.8 K/UL (ref 0.05–1.2)
MONOCYTES NFR BLD: 13 % (ref 3–10)
NEUTS SEG # BLD: 3.8 K/UL (ref 1.8–8)
NEUTS SEG NFR BLD: 58 % (ref 40–73)
PLATELET # BLD AUTO: 322 K/UL (ref 135–420)
PMV BLD AUTO: 10.5 FL (ref 9.2–11.8)
POTASSIUM SERPL-SCNC: 5.2 MMOL/L (ref 3.5–5.5)
PROT SERPL-MCNC: 6.6 G/DL (ref 6.4–8.2)
RBC # BLD AUTO: 3.88 M/UL (ref 4.2–5.3)
SODIUM SERPL-SCNC: 139 MMOL/L (ref 136–145)
T4 FREE SERPL-MCNC: 0.9 NG/DL (ref 0.7–1.5)
TRIGL SERPL-MCNC: 74 MG/DL (ref ?–150)
TSH SERPL DL<=0.05 MIU/L-ACNC: 1.82 UIU/ML (ref 0.36–3.74)
VLDLC SERPL CALC-MCNC: 14.8 MG/DL
WBC # BLD AUTO: 6.4 K/UL (ref 4.6–13.2)

## 2018-04-19 PROCEDURE — 80053 COMPREHEN METABOLIC PANEL: CPT | Performed by: INTERNAL MEDICINE

## 2018-04-19 PROCEDURE — 85025 COMPLETE CBC W/AUTO DIFF WBC: CPT | Performed by: INTERNAL MEDICINE

## 2018-04-19 PROCEDURE — 80061 LIPID PANEL: CPT | Performed by: INTERNAL MEDICINE

## 2018-04-19 PROCEDURE — 36415 COLL VENOUS BLD VENIPUNCTURE: CPT | Performed by: INTERNAL MEDICINE

## 2018-04-19 PROCEDURE — 84439 ASSAY OF FREE THYROXINE: CPT | Performed by: INTERNAL MEDICINE

## 2018-04-25 NOTE — PROGRESS NOTES
Anemia is improved, cholesterol has increased a little since last check, but looking back over the past 2 years is staying stable.   Otherwise labs are normal.

## 2018-05-10 RX ORDER — MONTELUKAST SODIUM 10 MG/1
TABLET ORAL
Qty: 90 TAB | Refills: 0 | Status: SHIPPED | OUTPATIENT
Start: 2018-05-10 | End: 2018-08-03 | Stop reason: SDUPTHER

## 2018-08-06 RX ORDER — MONTELUKAST SODIUM 10 MG/1
TABLET ORAL
Qty: 90 TAB | Refills: 0 | Status: SHIPPED | OUTPATIENT
Start: 2018-08-06 | End: 2018-10-31 | Stop reason: SDUPTHER

## 2018-08-20 ENCOUNTER — OFFICE VISIT (OUTPATIENT)
Dept: FAMILY MEDICINE CLINIC | Age: 73
End: 2018-08-20

## 2018-08-20 VITALS
RESPIRATION RATE: 18 BRPM | SYSTOLIC BLOOD PRESSURE: 134 MMHG | WEIGHT: 145.4 LBS | TEMPERATURE: 97.3 F | DIASTOLIC BLOOD PRESSURE: 70 MMHG | BODY MASS INDEX: 25.76 KG/M2 | OXYGEN SATURATION: 97 % | HEART RATE: 83 BPM | HEIGHT: 63 IN

## 2018-08-20 DIAGNOSIS — Z90.49 H/O RESECTION OF SMALL BOWEL: ICD-10-CM

## 2018-08-20 DIAGNOSIS — M85.89 OSTEOPENIA OF MULTIPLE SITES: Chronic | ICD-10-CM

## 2018-08-20 DIAGNOSIS — E78.2 MIXED HYPERLIPIDEMIA: Chronic | ICD-10-CM

## 2018-08-20 DIAGNOSIS — R53.82 CHRONIC FATIGUE: ICD-10-CM

## 2018-08-20 DIAGNOSIS — D50.0 IRON DEFICIENCY ANEMIA DUE TO CHRONIC BLOOD LOSS: Chronic | ICD-10-CM

## 2018-08-20 DIAGNOSIS — J45.20 MILD INTERMITTENT REACTIVE AIRWAY DISEASE WITHOUT COMPLICATION: Primary | Chronic | ICD-10-CM

## 2018-08-20 DIAGNOSIS — F41.1 ANXIETY, GENERALIZED: ICD-10-CM

## 2018-08-20 DIAGNOSIS — G89.4 CHRONIC PAIN SYNDROME: Chronic | ICD-10-CM

## 2018-08-20 DIAGNOSIS — Z87.11 HX OF PEPTIC ULCER: Chronic | ICD-10-CM

## 2018-08-20 DIAGNOSIS — K21.9 GASTROESOPHAGEAL REFLUX DISEASE, ESOPHAGITIS PRESENCE NOT SPECIFIED: ICD-10-CM

## 2018-08-20 DIAGNOSIS — I10 ESSENTIAL HYPERTENSION: Chronic | ICD-10-CM

## 2018-08-20 DIAGNOSIS — J30.1 ALLERGIC RHINITIS DUE TO POLLEN, UNSPECIFIED SEASONALITY: Chronic | ICD-10-CM

## 2018-08-20 DIAGNOSIS — Z85.3 HX OF BREAST CANCER: Chronic | ICD-10-CM

## 2018-08-20 NOTE — PATIENT INSTRUCTIONS
Referrals placed to GI and psychiatry     Follow up with Lennox Jean MD in 6 months (30m). Arrive 15 minutes prior to scheduled appointment time for check-in. Call and request an earlier appointment if you have questions or concerns. A HEALTHY LIFESTYLE IS RECOMMENDED FOR EVERYONE! Your doctor recommends a lifestyle that incorporates daily exercise and a diet focused on whole, unprocessed foods. Aim to follow a diet of 2/3 non-starchy vegetables and low glycemic impact fruits and 1/3 lean proteins. Avoid processed/refined carbohydrates (especially bread products, bakery items, sugary drinks, cereals, crackers and sweets). Minimum 30 minutes of cardio and weight training/resistance exercise daily to build muscle, reduce insulin sensitivity and increase resting fat & calorie burning capacity. TESTING RESULTS   Normal results will be released to [employer] or sent by [address] mail. [address] [facility] #[phone]. Results of tests performed at outside facilities will not appear in [address] If you have questions about your results, please feel free to schedule a follow up appointment to discuss your concerns with your PCP. MEDICATION REFILLS      -- Medication refills should be requested at least 2 business days in advance through your pharmacy. Refills will not be provided by the after hours/on call provider.

## 2018-08-20 NOTE — MR AVS SNAPSHOT
[address] [address] [facility] [address] [phone] Patient: Lilian Hart MRN: [id number] ERT:6/32/[id number] Visit Information Date & Time Provider Department Dept. Phone Encounter #  
 8/20/2018 11:00 AM Mamadou Marie, [address] [place] [phone] [id number] Follow-up Instructions Return in about 6 months (around 2/20/2019). Upcoming Health Maintenance Date Due  
 GLAUCOMA SCREENING Q2Y 2/4/2018 Influenza Age 5 to Adult 8/1/2018 DTaP/Tdap/Td series (1 - Tdap) 4/13/2019* MEDICARE YEARLY EXAM 4/14/2019 BREAST CANCER SCRN MAMMOGRAM 7/30/2020 Bone Densitometry 2/1/2021 COLONOSCOPY 1/27/2025 *Topic was postponed. The date shown is not the original due date. Allergies as of 8/20/2018  Review Complete On: 8/20/2018 By: Mamadou Marie MD  
  
 Severity Noted Reaction Type Reactions Morphine Medium 09/03/2014   Side Effect Other (comments) Gastroparesis Neosporin [Benzalkonium Chloride] Medium 09/03/2014   Intolerance Rash Pt reports no allergy Penicillin G  01/07/2014    Rash  
 Sulfa (Sulfonamide Antibiotics)  01/07/2014    Rash Current Immunizations  Reviewed on 10/17/2016 Name Date Influenza High Dose Vaccine PF 10/12/2017, 9/14/2016, 9/14/2015 Influenza Vaccine 9/15/2010 Pneumococcal Conjugate (PCV-13) 7/29/2015 11:21 AM  
 Pneumococcal Polysaccharide (PPSV-23) 10/17/2016 11:34 AM, 10/1/2008 Not reviewed this visit You Were Diagnosed With   
  
 Codes Comments Mild intermittent reactive airway disease without complication    -  Primary ICD-10-CM: J45.20 ICD-9-CM: 493.90 Mixed hyperlipidemia     ICD-10-CM: E78.2 ICD-9-CM: 272.2 Iron deficiency anemia due to chronic blood loss     ICD-10-CM: D50.0 ICD-9-CM: 280.0 Allergic rhinitis due to pollen, unspecified seasonality     ICD-10-CM: J30.1 ICD-9-CM: 477.0 Chronic pain syndrome     ICD-10-CM: G89.4 ICD-9-CM: 338.4 Essential hypertension     ICD-10-CM: I10 
ICD-9-CM: 401.9 H/O resection of small bowel     ICD-10-CM: Z90.49 ICD-9-CM: V15.29 Hx of peptic ulcer     ICD-10-CM: Z87.11 ICD-9-CM: V12.71 Hx of breast cancer     ICD-10-CM: Z85.3 ICD-9-CM: V10.3 Osteopenia of multiple sites     ICD-10-CM: M85.89 ICD-9-CM: 733.90 Gastroesophageal reflux disease, esophagitis presence not specified     ICD-10-CM: K21.9 ICD-9-CM: 530.81 Anxiety, generalized     ICD-10-CM: F41.1 ICD-9-CM: 300.02 Vitals BP Pulse Temp Resp Height(growth percentile) Weight(growth percentile) 134/70 83 97.3 °F (36.3 °C) (Oral) 18 5' 3\" (1.6 m) 145 lb 6.4 oz (66 kg) LMP SpO2 BMI OB Status Smoking Status (LMP Unknown) 97% 25.76 kg/m2 Hysterectomy Never Smoker BMI and BSA Data Body Mass Index Body Surface Area 25.76 kg/m 2 1.71 m 2 Preferred Pharmacy Pharmacy Name Phone [phone], [address] [phone] Your Updated Medication List  
  
   
This list is accurate as of 8/20/18 11:48 AM.  Always use your most recent med list.  
  
  
  
  
 aspirin delayed-release 81 mg tablet Take  by mouth daily. BIOTIN PO Take 1,000 mg by mouth daily. cyanocobalamin 500 mcg tablet Commonly known as:  VITAMIN B12 Take 500 mcg by mouth daily. DULoxetine 60 mg capsule Commonly known as:  CYMBALTA Take 1 capsule by mouth daily. fentaNYL 25 mcg/hr PATCH Commonly known as:  DURAGESIC  
1 Patch by TransDERmal route every seventy-two (72) hours. FLEXERIL 5 mg tablet Generic drug:  cyclobenzaprine Take 5 mg by mouth three (3) times daily as needed for Muscle Spasm(s). gabapentin 300 mg capsule Commonly known as:  NEURONTIN  
TAKE 1 CAPSULE BY MOUTH 4 TIMES DAILY  
  
 LECITHIN PO Take 400 mg by mouth two (2) times a day. lisinopril 20 mg tablet Commonly known as:  Ora Bee Take 1 Tab by mouth daily. MOBIC 15 mg tablet Generic drug:  meloxicam  
Take 15 mg by mouth two (2) times a day. montelukast 10 mg tablet Commonly known as:  SINGULAIR  
TAKE 1 TABLET BY MOUTH ONCE DAILY  
  
 multivitamin tablet Commonly known as:  ONE A DAY Take 1 Tab by mouth daily. OTHER  
  
 simvastatin 10 mg tablet Commonly known as:  ZOCOR  
TAKE 1 TABLET BY MOUTH NIGHTLY  
  
 VALTREX 1 gram tablet Generic drug:  valACYclovir Take  by mouth. VITAMIN C 1,000 mg tablet Generic drug:  ascorbic acid (vitamin C) Take 1,000 mg by mouth three (3) times daily. VOLTAREN 1 % Gel Generic drug:  diclofenac Apply  to affected area four (4) times daily. We Performed the Following REFERRAL TO GASTROENTEROLOGY [LIO59 Custom] Comments:  
 Increase in acid reflux symptoms, hx of stomach ulcer, hx of collagenous colitis REFERRAL TO PSYCHIATRY [REF91 Custom] Comments:  
 Longstanding anxiety, causing increasing moodiness/irritability with her spouse Follow-up Instructions Return in about 6 months (around 2/20/2019). Referral Information Referral ID Referred By Referred To  
  
 [id number] [address] [place] , [address] [place] [address] [address] Phone: [phone] Fax: [phone] Visits Status Start Date End Date 1 New Request 8/20/18 8/20/19 If your referral has a status of pending review or denied, additional information will be sent to support the outcome of this decision. Referral ID Referred By Referred To  
 [id number] Julieta Flores MD  
   [facility] [address] Phone: [phone] Fax: [phone] Visits Status Start Date End Date 1 New Request 8/20/18 8/20/19 If your referral has a status of pending review or denied, additional information will be sent to support the outcome of this decision. Patient Instructions Referrals placed to GI and psychiatry Follow up with Savana Fang MD in 6 months (30m). Arrive 15 minutes prior to scheduled appointment time for check-in. Call and request an earlier appointment if you have questions or concerns. A HEALTHY LIFESTYLE IS RECOMMENDED FOR EVERYONE! Your doctor recommends a lifestyle that incorporates daily exercise and a diet focused on whole, unprocessed foods. Aim to follow a diet of 2/3 non-starchy vegetables and low glycemic impact fruits and 1/3 lean proteins. Avoid processed/refined carbohydrates (especially bread products, bakery items, sugary drinks, cereals, crackers and sweets). Minimum 30 minutes of cardio and weight training/resistance exercise daily to build muscle, reduce insulin sensitivity and increase resting fat & calorie burning capacity. TESTING RESULTS Normal results will be released to [employer] or sent by [address] mail. [address] [facility] #[phone]. Results of tests performed at outside facilities will not appear in [address] If you have questions about your results, please feel free to schedule a follow up appointment to discuss your concerns with your PCP. MEDICATION REFILLS   
 
-- Medication refills should be requested at least 2 business days in advance through your pharmacy. Refills will not be provided by the after hours/on call provider. Introducing [facility]! Dear Levonia Phalen: Thank you for requesting a [employer] account. Our records indicate that you already have an active [employer] account. You can access your account anytime at [email or web address] Did you know that you can access your hospital and ER discharge instructions at any time in [employer]?   You can also review all of your test results from your hospital stay or ER visit. Additional Information If you have questions, please visit the Frequently Asked Questions section of the [employer] website at [email or web address] Remember, [employer] is NOT to be used for urgent needs. For medical emergencies, dial 911. Now available from your iPhone and Android! Please provide this summary of care documentation to your next provider. Your primary care clinician is listed as Vivienne Forde. If you have any questions after today's visit, please call [phone].

## 2018-08-20 NOTE — PROGRESS NOTES
PRE-VISIT PLANNING:     Future Appointments  Date Time Provider Lennie Jenseni   2018 11:00 AM Marciano Paige MD [address] (PCP is Marciano Paige MD) is scheduled for an office visit with Marciano Paige MD on 2018 for follow up. Encounter opened prior to visit to complete pre-visit planning, update and review pertinent sections in the chart. Last office visit with PCP was 2018. Rooming Nurse Items:  -- Offer flu shot to patient per office policy. Document in nursing note if clinic does not have flu shot in stock, if patient declines or if patient reports having this season's flu shot administered elsewhere (please note both date of admin and clinic/pharmacy pt reports provided vaccine). -- Release for last eye exam note  -- ACP     Pending items for provider to review with patient:  -- Completed labs 18  Health Maintenance Due   Topic Date Due    GLAUCOMA SCREENING Q2Y  2018    Influenza Age 9 to Adult  2018          Internal Medicine  Follow Up Note  [facility]  [address] [place], [facility], [address]  Phone [phone]; Fax [phone]    Date of Service:  2018  Patient's Name & : Marnie Blanc - 1945     Assessment/Plan/Orders:       Marnie Blanc is a 68 y.o. female who was seen today for follow up. The primary encounter diagnosis was Mild intermittent reactive airway disease without complication. Diagnoses of Mixed hyperlipidemia, Iron deficiency anemia due to chronic blood loss, Allergic rhinitis due to pollen, unspecified seasonality, Chronic pain syndrome, Essential hypertension, H/O resection of small bowel, Hx of peptic ulcer, Hx of breast cancer, Osteopenia of multiple sites, Gastroesophageal reflux disease, esophagitis presence not specified, Anxiety, generalized, and Chronic fatigue were also pertinent to this visit.   BP well controlled  Patient hoping to slowly taper off pain medications after facet injections for neck are done. Longstanding generalized anxiety, denies depression, but suspect fatigue is likely related to mood disorder in spite of Cymbalta. Refer to psych. Body mass index is 25.76 kg/(m^2). Change in Patient Weight by Encounter (365 Day Lookback)    (04/13/2018) Office Visit  Last Recorded Weight: 62.1 kg (137 lb)             Percent Change: -3.57%   [10/12/2017 to 04/13/2018 (183 Days)]    (10/12/2017) Office Visit  Last Recorded Weight: 64.4 kg (142 lb)     Ideal body weight: 115 lb 8.3 oz (52.4 kg)  Adjusted ideal body weight: 127 lb 7.6 oz (57.8 kg)    Discussed lifestyle recommendations, maintain current weight. Follow up weight/BMI at next visit. RTC 6 months, sooner PRN   The patient states understanding of recommended treatment plan. Orders Placed This Encounter    REFERRAL TO GASTROENTEROLOGY    REFERRAL TO PSYCHIATRY       Patient Instructions     Referrals placed to GI and psychiatry     Follow up with Kimmie Justice MD in 6 months (30m). Arrive 15 minutes prior to scheduled appointment time for check-in. Call and request an earlier appointment if you have questions or concerns. A HEALTHY LIFESTYLE IS RECOMMENDED FOR EVERYONE! Your doctor recommends a lifestyle that incorporates daily exercise and a diet focused on whole, unprocessed foods. Aim to follow a diet of 2/3 non-starchy vegetables and low glycemic impact fruits and 1/3 lean proteins. Avoid processed/refined carbohydrates (especially bread products, bakery items, sugary drinks, cereals, crackers and sweets). Minimum 30 minutes of cardio and weight training/resistance exercise daily to build muscle, reduce insulin sensitivity and increase resting fat & calorie burning capacity. TESTING RESULTS   Normal results will be released to [employer] or sent by [address] mail. [address] [facility] #[phone].   Results of tests performed at outside facilities will not appear in [address] If you have questions about your results, please feel free to schedule a follow up appointment to discuss your concerns with your PCP. MEDICATION REFILLS      -- Medication refills should be requested at least 2 business days in advance through your pharmacy. Refills will not be provided by the after hours/on call provider. Savana Fang MD - Internal Medicine  8/21/2018, 9:06 PM    HPI & ROS:     Chief Complaint   Patient presents with    Hypertension     Follow up         Chato Morgan presents for follow up. Seeing pain specialist regularly for low back pain and neck pain, hoping to be approved for facet injections in her neck. C/o feeling \"foggy\", Dr. Phil Jones stopped percocet recently and afterward is when she noticed. Still on pain patch and Cymbalta, hoping to come off pain meds after neck injections. Feels she doesn't tolerate heat/hot weather as well. 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 No memory issues. Seeing Dr. Anusha Cabral for osteopenia and arthritis. Has questions about prilosec, used to take Prilosec from Dr. Nestor Shelton and reports hx of PUD in the past.  Taking NSAIDS, denies stomach upset, but then reports she still gets reflux symptoms even with HOB elevated. Has more gas in mornings. Longstanding generalized anxiety, denies depression, but c/o being tired all the time (but not drowsy). Feels invigorated while doing [employer] services. Heat makes her feel worse. Chronic headaches. Patient feels she has been increasingly irritably with her spouse, reports she has been treated for anxiety in the past, currently on Cymbalta which she states was selected due to hx of chronic pain. Patient complains of chronic pain, difficulty with mental focus, irritability, anxiety, heartburn/gas, otherwise the remainder of the review of systems is negative.      Patient Active Problem List Diagnosis    Hx of peptic ulcer    Intermittent tremor    Osteopenia     Managed by Dr. Minnette Holstein @[facility]  Osteopenia on Ca+D  2/1/18: Worsening osteopenia in L spine and left hip, FRAX 10 yr fracture risk 9.9%, hip fx 1.5% --> consider treatment to reduce fracture risk, Monitor every 2-3 yrs  3/7/18:  Reclast infusion scheduled with Dr. 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 Juan M Tomas (pain mgmt)      Cervical spondylosis without myelopathy    Degeneration of cervical intervertebral disc    Myalgia and myositis, unspecified       Objective:     /70  Pulse 83  Temp 97.3 °F (36.3 °C) (Oral)   Resp 18  Ht 5' 3\" (1.6 m)  Wt 145 lb 6.4 oz (66 kg)  LMP  (LMP Unknown)  SpO2 97%  BMI 25.76 kg/m2    Physical Exam   Constitutional: She is oriented to person, place, and time. She appears well-developed and well-nourished. No distress. HENT:   Head: Normocephalic and atraumatic. Right Ear: External ear normal.   Left Ear: External ear normal.   Nose: Nose normal.   Eyes: Conjunctivae and EOM are normal. Right eye exhibits no discharge. Left eye exhibits no discharge. No scleral icterus. Neck: Normal range of motion. Neck supple. No JVD present. No tracheal deviation present. No thyromegaly present. Cardiovascular: Normal rate, regular rhythm, normal heart sounds and intact distal pulses. Exam reveals no gallop and no friction rub. No murmur heard. Pulmonary/Chest: Effort normal. No stridor. No respiratory distress. She has no wheezes. She has no rales. She exhibits no tenderness. Musculoskeletal: She exhibits no edema. Lymphadenopathy:     She has no cervical adenopathy. Neurological: She is alert and oriented to person, place, and time. She exhibits normal muscle tone. Coordination normal.   Skin: Skin is warm and dry. She is not diaphoretic. Psychiatric: She has a normal mood and affect.  Her behavior is normal. Judgment and thought content normal.         Additional History     Past Medical History:   Diagnosis Date    Anemia, iron deficiency secondary to bowel resection     Followed by Dr Gallo Pham, gets iron transfusions PRN, not expected to completely normalize    Cervical spondylosis without myelopathy 1/7/2014    Sees Dr. Laci Diaz    Chronic pain syndrome 1/7/2014    Spinal DDD and scoliosis, Sees Dr. Thania Garcia Collagenous colitis     Dr. Trace Mittal, using enterocort sparingly    Constipation     Dr. Oswaldo Hernández DDD (degenerative disc disease), cervical     Sees Dr. Iqra Gayle hypertension     Well-controlled on meds    Gastroparesis     Resolved, was secondary to morphine    Gastroparesis     Resolved, was secondary to morphine     Genital herpes 1988    Pt taking meds    GERD (gastroesophageal reflux disease)     Dr. Trace Mittal    H/O resection of small bowel 2011    Secondary to ischemic gut from adhesions    Hx of breast cancer     Had lumpectomy, sees Dr. Mayte Victor Hyperlipidemia     Well controlled on low dose statin    Osteoarthritis     Osteopenia     Dr. Chely Solis    Pain in lower jaw 9/3/2014    -- Dr. Cecy Johnson (dentist)    [facility] Peptic ulcer     taking meds     Reactive airway disease     Wheezing after exposure to spray , has albuterol PRN     Past Surgical History:   Procedure Laterality Date    HX BREAST LUMPECTOMY Right september 2007    cancerous    HX COLONOSCOPY  2/21/2008    Collagenous colitis on biopsy - Dr. Ashlyn Goins HX COLONOSCOPY  2/14/2011    Diverticulosis, internal hemorrhoids - Dr. Ahslyn Goins [address] RESECTION  07/01/2011    Middle 1/3 of small bowel; caused by scar tissue from hysterectomy, gangrenous small bowel     Social History   Substance Use Topics    Smoking status: Never Smoker    Smokeless tobacco: Never Used    Alcohol use No     Current Outpatient Prescriptions   Medication Sig    montelukast (SINGULAIR) 10 mg tablet TAKE 1 TABLET BY MOUTH ONCE DAILY    meloxicam (MOBIC) 15 mg tablet Take 15 mg by mouth two (2) times a day.  gabapentin (NEURONTIN) 300 mg capsule TAKE 1 CAPSULE BY MOUTH 4 TIMES DAILY    lisinopril (PRINIVIL, ZESTRIL) 20 mg tablet Take 1 Tab by mouth daily.  simvastatin (ZOCOR) 10 mg tablet TAKE 1 TABLET BY MOUTH NIGHTLY    OTHER     diclofenac (VOLTAREN) 1 % gel Apply  to affected area four (4) times daily.  fentaNYL (DURAGESIC) 25 mcg/hr PATCH 1 Patch by TransDERmal route every seventy-two (72) hours.  valACYclovir (VALTREX) 1 gram tablet Take  by mouth.  DULoxetine (CYMBALTA) 60 mg capsule Take 1 capsule by mouth daily.  cyclobenzaprine (FLEXERIL) 5 mg tablet Take 5 mg by mouth three (3) times daily as needed for Muscle Spasm(s).  aspirin delayed-release 81 mg tablet Take  by mouth daily.  multivitamin (ONE A DAY) tablet Take 1 Tab by mouth daily.  BIOTIN PO Take 1,000 mg by mouth daily.  cyanocobalamin (VITAMIN B12) 500 mcg tablet Take 500 mcg by mouth daily.  LECITHIN PO Take 400 mg by mouth two (2) times a day.  ascorbic acid (VITAMIN C) 1,000 mg tablet Take 1,000 mg by mouth three (3) times daily. No current facility-administered medications for this visit.       Allergies   Allergen Reactions    Morphine Other (comments)     Gastroparesis    Neosporin [Benzalkonium Chloride] Rash     Pt reports no allergy    Penicillin G Rash    Sulfa (Sulfonamide Antibiotics) Rash       Encounter Diagnoses:     Encounter Diagnoses     ICD-10-CM ICD-9-CM   1. Mild intermittent reactive airway disease without complication U62.54 514.26   2. Mixed hyperlipidemia E78.2 272.2   3. Iron deficiency anemia due to chronic blood loss D50.0 280.0   4. Allergic rhinitis due to pollen, unspecified seasonality J30.1 477.0   5. Chronic pain syndrome G89.4 338.4   6. Essential hypertension I10 401.9   7. H/O resection of small bowel Z90.49 V15.29   8. Hx of peptic ulcer Z87.11 V12.71   9. Hx of breast cancer Z85.3 V10.3   10. Osteopenia of multiple sites M85.89 733.90   11. Gastroesophageal reflux disease, esophagitis presence not specified K21.9 530.81   12. Anxiety, generalized F41.1 300.02   13. Chronic fatigue R53.82 780.79            This document may have been created with the aid of dictation software.   Text may contain errors, particularly phonetic errors

## 2018-08-20 NOTE — PROGRESS NOTES
Brittny Armendariz is a 68 y.o. female (: 1945) presenting to address:    Chief Complaint   Patient presents with    Hypertension     Follow up       Vitals:    18 1106   BP: 134/70   Pulse: 83   Resp: 18   Temp: 97.3 °F (36.3 °C)   TempSrc: Oral   SpO2: 97%   Weight: 145 lb 6.4 oz (66 kg)   Height: 5' 3\" (1.6 m)   PainSc:   0 - No pain       Hearing/Vision:   No exam data present    Learning Assessment:     Learning Assessment 10/17/2016   PRIMARY LEARNER Patient   HIGHEST LEVEL OF EDUCATION - PRIMARY LEARNER  GRADUATED HIGH SCHOOL OR GED   BARRIERS PRIMARY LEARNER NONE     NONE   CO-LEARNER CAREGIVER -   PRIMARY LANGUAGE ENGLISH    NEED -   LEARNER PREFERENCE PRIMARY DEMONSTRATION   LEARNING SPECIAL TOPICS -   ANSWERED BY patient   RELATIONSHIP SELF   ASSESSMENT COMMENT -     Depression Screening:     PHQ over the last two weeks 2018   Little interest or pleasure in doing things Not at all   Feeling down, depressed, irritable, or hopeless Not at all   Total Score PHQ 2 0     Fall Risk Assessment:     Fall Risk Assessment, last 12 mths 2018   Able to walk? Yes   Fall in past 12 months? No     Abuse Screening:     Abuse Screening Questionnaire 2018   Do you ever feel afraid of your partner? N   Are you in a relationship with someone who physically or mentally threatens you? N   Is it safe for you to go home? Y     Coordination of Care Questionaire:   1. Have you been to the ER, urgent care clinic since your last visit? Hospitalized since your last visit? NO    2. Have you seen or consulted any other health care providers outside of the [facility] since your last visit? Include any pap smears or colon screening. YES  Dr Stas Gilman pain management follow up    Advanced Directive:   1. Do you have an Advanced Directive? YES    2. Would you like information on Advanced Directives? NO    Patient has not received flu vaccine.  Not able to offer due to lack of availability in clinic. Patient already has [facility].

## 2018-10-19 RX ORDER — LISINOPRIL 20 MG/1
TABLET ORAL
Qty: 90 TAB | Refills: 1 | Status: SHIPPED | OUTPATIENT
Start: 2018-10-19 | End: 2019-03-05 | Stop reason: SINTOL

## 2018-10-31 RX ORDER — MONTELUKAST SODIUM 10 MG/1
TABLET ORAL
Qty: 90 TAB | Refills: 0 | Status: SHIPPED | OUTPATIENT
Start: 2018-10-31 | End: 2019-02-01 | Stop reason: SDUPTHER

## 2018-11-23 ENCOUNTER — CLINICAL SUPPORT (OUTPATIENT)
Dept: FAMILY MEDICINE CLINIC | Age: 73
End: 2018-11-23

## 2018-11-23 DIAGNOSIS — Z23 ENCOUNTER FOR IMMUNIZATION: ICD-10-CM

## 2018-11-23 NOTE — PROGRESS NOTES
After obtaining consent, and per orders of Dr. Haylie Mckeon, injection of Influenza given by Tesfaye Driver LPN. Patient instructed to remain in clinic for 20 minutes afterwards, and to report any adverse reaction to me immediately.

## 2018-12-06 RX ORDER — SIMVASTATIN 10 MG/1
TABLET, FILM COATED ORAL
Qty: 90 TAB | Refills: 2 | Status: SHIPPED | OUTPATIENT
Start: 2018-12-06 | End: 2019-09-03 | Stop reason: SDUPTHER

## 2018-12-06 NOTE — TELEPHONE ENCOUNTER
Patient is requesting refill Zocor  Last Filled: 02/14/18  Qty: 90  Refills: 2    Last Visit: 08/20/18  Future Appointments   Date Time Provider Lennie Rodriges   2/18/2019 11:00 AM Asya Lowery MD [address] [place] [address],Third Floor Confirmed

## 2019-02-04 RX ORDER — MONTELUKAST SODIUM 10 MG/1
TABLET ORAL
Qty: 90 TAB | Refills: 0 | Status: SHIPPED | OUTPATIENT
Start: 2019-02-04 | End: 2019-05-06 | Stop reason: SDUPTHER

## 2019-02-18 ENCOUNTER — HOSPITAL ENCOUNTER (OUTPATIENT)
Dept: LAB | Age: 74
Discharge: HOME | End: 2019-02-18
Payer: MEDICARE

## 2019-02-18 ENCOUNTER — OFFICE VISIT (OUTPATIENT)
Dept: FAMILY MEDICINE CLINIC | Age: 74
End: 2019-02-18

## 2019-02-18 VITALS
BODY MASS INDEX: 25.76 KG/M2 | HEIGHT: 63 IN | SYSTOLIC BLOOD PRESSURE: 124 MMHG | HEART RATE: 88 BPM | OXYGEN SATURATION: 96 % | TEMPERATURE: 98.2 F | RESPIRATION RATE: 18 BRPM | WEIGHT: 145.4 LBS | DIASTOLIC BLOOD PRESSURE: 80 MMHG

## 2019-02-18 DIAGNOSIS — Z87.11 HX OF PEPTIC ULCER: Chronic | ICD-10-CM

## 2019-02-18 DIAGNOSIS — D50.0 IRON DEFICIENCY ANEMIA DUE TO CHRONIC BLOOD LOSS: Chronic | ICD-10-CM

## 2019-02-18 DIAGNOSIS — A60.00 HERPES SIMPLEX INFECTION OF GENITOURINARY SYSTEM: Chronic | ICD-10-CM

## 2019-02-18 DIAGNOSIS — I10 ESSENTIAL HYPERTENSION: Chronic | ICD-10-CM

## 2019-02-18 DIAGNOSIS — E78.2 MIXED HYPERLIPIDEMIA: Chronic | ICD-10-CM

## 2019-02-18 DIAGNOSIS — R25.1 INTERMITTENT TREMOR: Chronic | ICD-10-CM

## 2019-02-18 DIAGNOSIS — M85.89 OSTEOPENIA OF MULTIPLE SITES: Chronic | ICD-10-CM

## 2019-02-18 DIAGNOSIS — R05.3 PERSISTENT COUGH: ICD-10-CM

## 2019-02-18 DIAGNOSIS — G89.4 CHRONIC PAIN SYNDROME: Chronic | ICD-10-CM

## 2019-02-18 DIAGNOSIS — J45.20 MILD INTERMITTENT REACTIVE AIRWAY DISEASE WITHOUT COMPLICATION: Chronic | ICD-10-CM

## 2019-02-18 DIAGNOSIS — H92.02 PAIN IN LEFT EAR: ICD-10-CM

## 2019-02-18 DIAGNOSIS — I10 ESSENTIAL HYPERTENSION: Primary | Chronic | ICD-10-CM

## 2019-02-18 DIAGNOSIS — R51.9 PERSISTENT HEADACHES: Chronic | ICD-10-CM

## 2019-02-18 LAB
ALBUMIN SERPL-MCNC: 4.1 G/DL (ref 3.4–5)
ALBUMIN/GLOB SERPL: 1.4 {RATIO} (ref 0.8–1.7)
ALP SERPL-CCNC: 86 U/L (ref 45–117)
ALT SERPL-CCNC: 27 U/L (ref 13–56)
ANION GAP SERPL CALC-SCNC: 8 MMOL/L (ref 3–18)
APPEARANCE UR: CLEAR
AST SERPL-CCNC: 25 U/L (ref 15–37)
BASOPHILS # BLD: 0 K/UL (ref 0–0.1)
BASOPHILS NFR BLD: 0 % (ref 0–2)
BILIRUB SERPL-MCNC: 0.4 MG/DL (ref 0.2–1)
BILIRUB UR QL: NEGATIVE
BUN SERPL-MCNC: 13 MG/DL (ref 7–18)
BUN/CREAT SERPL: 12 (ref 12–20)
CALCIUM SERPL-MCNC: 9 MG/DL (ref 8.5–10.1)
CHLORIDE SERPL-SCNC: 100 MMOL/L (ref 100–108)
CHOLEST SERPL-MCNC: 165 MG/DL
CO2 SERPL-SCNC: 27 MMOL/L (ref 21–32)
COLOR UR: ABNORMAL
CREAT SERPL-MCNC: 1.12 MG/DL (ref 0.6–1.3)
DIFFERENTIAL METHOD BLD: ABNORMAL
EOSINOPHIL # BLD: 0.2 K/UL (ref 0–0.4)
EOSINOPHIL NFR BLD: 2 % (ref 0–5)
ERYTHROCYTE [DISTWIDTH] IN BLOOD BY AUTOMATED COUNT: 13.5 % (ref 11.6–14.5)
GLOBULIN SER CALC-MCNC: 2.9 G/DL (ref 2–4)
GLUCOSE SERPL-MCNC: 80 MG/DL (ref 74–99)
GLUCOSE UR STRIP.AUTO-MCNC: NEGATIVE MG/DL
HCT VFR BLD AUTO: 40.2 % (ref 35–45)
HDLC SERPL-MCNC: 91 MG/DL (ref 40–60)
HDLC SERPL: 1.8 {RATIO} (ref 0–5)
HGB BLD-MCNC: 12.7 G/DL (ref 12–16)
HGB UR QL STRIP: NEGATIVE
KETONES UR QL STRIP.AUTO: ABNORMAL MG/DL
LDLC SERPL CALC-MCNC: 50.6 MG/DL (ref 0–100)
LEUKOCYTE ESTERASE UR QL STRIP.AUTO: NEGATIVE
LIPID PROFILE,FLP: ABNORMAL
LYMPHOCYTES # BLD: 1.1 K/UL (ref 0.9–3.6)
LYMPHOCYTES NFR BLD: 16 % (ref 21–52)
MCH RBC QN AUTO: 29.7 PG (ref 24–34)
MCHC RBC AUTO-ENTMCNC: 31.6 G/DL (ref 31–37)
MCV RBC AUTO: 94.1 FL (ref 74–97)
MONOCYTES # BLD: 0.7 K/UL (ref 0.05–1.2)
MONOCYTES NFR BLD: 10 % (ref 3–10)
NEUTS SEG # BLD: 5 K/UL (ref 1.8–8)
NEUTS SEG NFR BLD: 72 % (ref 40–73)
NITRITE UR QL STRIP.AUTO: NEGATIVE
PH UR STRIP: 8 [PH] (ref 5–8)
PLATELET # BLD AUTO: 354 K/UL (ref 135–420)
PMV BLD AUTO: 11.9 FL (ref 9.2–11.8)
POTASSIUM SERPL-SCNC: 4.5 MMOL/L (ref 3.5–5.5)
PROT SERPL-MCNC: 7 G/DL (ref 6.4–8.2)
PROT UR STRIP-MCNC: NEGATIVE MG/DL
RBC # BLD AUTO: 4.27 M/UL (ref 4.2–5.3)
SODIUM SERPL-SCNC: 135 MMOL/L (ref 136–145)
SP GR UR REFRACTOMETRY: 1.02 (ref 1–1.03)
TRIGL SERPL-MCNC: 117 MG/DL (ref ?–150)
UROBILINOGEN UR QL STRIP.AUTO: 0.2 EU/DL (ref 0.2–1)
VLDLC SERPL CALC-MCNC: 23.4 MG/DL
WBC # BLD AUTO: 6.9 K/UL (ref 4.6–13.2)

## 2019-02-18 PROCEDURE — 36415 COLL VENOUS BLD VENIPUNCTURE: CPT

## 2019-02-18 PROCEDURE — 80053 COMPREHEN METABOLIC PANEL: CPT

## 2019-02-18 PROCEDURE — 85025 COMPLETE CBC W/AUTO DIFF WBC: CPT

## 2019-02-18 PROCEDURE — 81003 URINALYSIS AUTO W/O SCOPE: CPT

## 2019-02-18 PROCEDURE — 80061 LIPID PANEL: CPT

## 2019-02-18 NOTE — PROGRESS NOTES
Karolyn Bland is a 68 y.o. female (: 1945) presenting to follow up on:    Chief Complaint   Patient presents with    Blood Pressure Check       Vitals:    19 1109   BP: 124/80   Pulse: 88   Resp: 18   Temp: 98.2 °F (36.8 °C)   TempSrc: Oral   SpO2: 96%   Weight: 145 lb 6.4 oz (66 kg)   Height: 5' 3\" (1.6 m)   PainSc:   0 - No pain         Coordination of Care Questionaire:   1. Have you been to the ER, urgent care clinic since your last visit? Hospitalized since your last visit? no    2. Have you seen or consulted any other health care providers outside of the [address] [place] [address] since your last visit? Include any pap smears or colon screening. Dr Nedra Bolden for anemia, Dr Erasto Arriaga for pain mgmt,     Advanced Directive:   1. Do you have an Advanced Directive? Yes    2. Would you like information on Advanced Directives?  NO    Health Maintenance Due   Topic Date Due    Shingrix Vaccine Age 49> (1 of 2) 1996     Has not gotten shingrix

## 2019-02-18 NOTE — PATIENT INSTRUCTIONS
Chest Xray and Labs today (in the office)     Routine follow up with Miladis Coronel MD in 6  Months, sooner as needed    Schedule evaluation with Dr. Monica Baird for tremors and headaches. Call office 1-2 weeks prior to appointment to confirm if testing is required before appointment. Lab hours at [facility] are 7:30am-3pm Monday-Friday. Check in 15 minutes prior to your scheduled appointment time. Call and request an earlier appointment if needed to address any questions or concerns . TESTING RESULTS   Results will be released to [facility]. Normal results will be sent via mail. If you have questions about your results, please schedule a follow up appointment to discuss with your PCP. MEDICATION REFILLS    -- Please allow at least 2 business days for refill requests to be addressed. Medication refills may be requested through your pharmacy. Refills will not be provided by the after hours/on call provider.

## 2019-02-18 NOTE — PROGRESS NOTES
PRE-VISIT PLANNING:     PATIENT NAME:  Kika Vargas   :  1945   PCP:  Lexx Flores MD      Future Appointments   Date Time Provider Lennie Rodriges   2019 11:00 AM Lexx Flores MD [address] is scheduled for an office visit with Carloz Kelly MD on 2019 for follow up. Encounter opened prior to visit to complete pre-visit planning. Last office visit with PCP was 18. Pending issues:  -- Followed by pain mgmt (Dr. Shannan Warren)  -- Followed by hematology for iron def anemia (VOA)  -- Followed by rheum for osteopenia  -- Referred to psychiatry at last visit due to longstanding KEARA and fatigue most likely related to mood disorder    There are no preventive care reminders to display for this patient. Rooming Nurse:     -- Release for Shingrix vaccine administration reports (if done)         Internal Medicine  Follow Up Note  [facility] [address] [place], [place], [address]  Phone [phone]; Fax [phone]    Date of Service:  2019  Patient's Name & : Kika Vargas - 1945     Assessment/Plan/Orders:       Kika Vargas is a 68 y.o. female who was seen today for follow up. The primary encounter diagnosis was Essential hypertension. Diagnoses of Chronic pain syndrome, Iron deficiency anemia due to chronic blood loss, Herpes simplex infection of genitourinary system, Hx of peptic ulcer, Mixed hyperlipidemia, Osteopenia of multiple sites, Mild intermittent reactive airway disease without complication, Pain in left ear, Persistent cough, Intermittent tremor, and Persistent headaches were also pertinent to this visit.   Sore throat x 1 day with normal exam findings today, likely viral URI, symptomatic/supportive care  Chronic cough x 6 wks or longer with hx of previous cyst removal from RLL, check CXR today, eval with ENT  Chronic intermittent left jaw/ear pain not associated with chewing or jaw movement, eval with ENT  Fatigue with normal iron levels, patient provided psychiatry office information to schedule evaluation for anxiety  Hypertension well-controlled on lisinopril 20 mg daily  Hyperlipidemia controlled on last labs (4/2018), taking low dose statin  Update fasting labs   Eval headaches and tremor with neuro  Chronic pain, osteopenia, iron deficiency anemia managed by specialists  RTC 6 months   Body mass index is 25.76 kg/m². Current BMI is okay. I spent greater than 50% of the 42 total minutes of today's visit in face to face counseling regarding the above patient concerns, differential diagnoses, my assessment and recommendations regarding evaluation and management plans. Patient Instructions     Chest Xray and Labs today (in the office)     Routine follow up with Shaye Miller MD in 6  Months, sooner as needed    Schedule evaluation with Dr. Gonzalez Quan for tremors and headaches. Call office 1-2 weeks prior to appointment to confirm if testing is required before appointment. Lab hours at [facility] are 7:30am-3pm Monday-Friday. Check in 15 minutes prior to your scheduled appointment time. Call and request an earlier appointment if needed to address any questions or concerns . TESTING RESULTS   Results will be released to [facility]. Normal results will be sent via mail. If you have questions about your results, please schedule a follow up appointment to discuss with your PCP. MEDICATION REFILLS    -- Please allow at least 2 business days for refill requests to be addressed. Medication refills may be requested through your pharmacy. Refills will not be provided by the after hours/on call provider. The patient states understanding of recommended treatment plan.       Orders Placed This Encounter    XR CHEST PA LAT    CBC WITH AUTOMATED DIFF    METABOLIC PANEL, COMPREHENSIVE    LIPID PANEL    URINALYSIS W/ RFLX MICROSCOPIC    REFERRAL TO ENT-OTOLARYNGOLOGY    REFERRAL TO NEUROLOGY    dextromethorphan hbr (ROBITUSSIN PED) 7.5 mg/5 mL       Servando Whelan MD - Internal Medicine  02/18/2019, 8:08 AM    HPI & ROS:     Chief Complaint   Patient presents with    Blood Pressure Check         Jesús Trammell presents for follow up. Has a list of concerns today. Chronic fatigue, saw Dr. Katharine Tripp Jan 10th, iron levels were good. C/o ~6 weeks of dry throat, dry cough (worsening and disturbing sleep). Has been using OTC cough suppressant for past few weeks. Tried humidifier, but it was building up scale. Last Saturday developed sore throat. Pt notes hx of tonsillectomy. 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 Was under the care of Dr. Roberta Huntley at the time. Pain in left jaw/near ear, intermittent, not associated with chewing. Weight is stable. Still having headaches (constant) and tremors, evaluated by neuro (Dr. Nolvia Mireles) in 2017, pt did not follow up as recommended - states she never tried the medication recommended for tremor. Feeling shaky with worsening of chronic tremors in hands and chin. Review of Systems   Constitutional: Positive for malaise/fatigue. Negative for chills, diaphoresis, fever and weight loss. HENT: Positive for sore throat and tinnitus. Negative for congestion, hearing loss and sinus pain. Respiratory: Positive for cough. Negative for hemoptysis, sputum production, shortness of breath and wheezing. Cardiovascular: Negative for chest pain, palpitations, orthopnea and leg swelling. Neurological: Positive for tremors and headaches. Negative for dizziness, tingling, sensory change, focal weakness and seizures.         Patient Active Problem List    Diagnosis    Persistent headaches     Previously evaluated by Dr. Marti Real Hx of peptic ulcer    Intermittent tremor     Evaluated by Dr. Cassy Smyth (neuro) several years ago       Osteopenia     Managed by Dr. Lucy Arroyo [facility]  Osteopenia on Ca+D  2/1/18: Worsening osteopenia in L spine and left hip, FRAX 10 yr fracture risk 9.9%, hip fx 1.5% --> consider treatment to reduce fracture risk, Monitor every 2-3 yrs  3/7/18:  Reclast infusion scheduled with Dr. Nadia Rodrigues Allergic rhinitis due to allergen    Reactive airway disease     Wheezing after exposure to spray , has albuterol PRN      Essential hypertension     Well-controlled on Lisinopril 20 mg PO daily       Genital herpes     since 1988, pt taking meds      Hyperlipidemia     Well controlled on low dose statin      Osteoarthritis    Hx of breast cancer     Had lumpectomy      Anemia, iron deficiency secondary to bowel resection     Followed by Dr Goyo Yanes, gets iron transfusions PRN      H/O resection of small bowel     Secondary to ischemic gut from adhesions      Chronic pain syndrome     Managed by Dr. Marcin Noyola (pain mgmt)      Cervical spondylosis without myelopathy    Degeneration of cervical intervertebral disc    Myalgia and myositis, unspecified       Objective:     /80 (BP 1 Location: Left arm, BP Patient Position: Sitting)   Pulse 88   Temp 98.2 °F (36.8 °C) (Oral)   Resp 18   Ht 5' 3\" (1.6 m)   Wt 145 lb 6.4 oz (66 kg)   LMP  (LMP Unknown)   SpO2 96%   BMI 25.76 kg/m²     Physical Exam   Constitutional: She is oriented to person, place, and time. She appears well-developed and well-nourished. No distress. HENT:   Head: Normocephalic and atraumatic. Right Ear: External ear normal.   Left Ear: External ear normal.   Nose: Nose normal.   Eyes: Conjunctivae and EOM are normal. Right eye exhibits no discharge. Left eye exhibits no discharge. No scleral icterus. Neck: Normal range of motion. Neck supple. No JVD present. No tracheal deviation present. No thyromegaly present.    Cardiovascular: Normal rate, regular rhythm, normal heart sounds and intact distal pulses. Exam reveals no gallop and no friction rub. No murmur heard. Pulmonary/Chest: Effort normal. No stridor. No respiratory distress. She has no wheezes. She has no rales. She exhibits no tenderness. Musculoskeletal: She exhibits no edema. Lymphadenopathy:     She has no cervical adenopathy. Neurological: She is alert and oriented to person, place, and time. She has normal strength. She displays tremor. No cranial nerve deficit or sensory deficit. She exhibits normal muscle tone. Coordination normal.   Perhaps mildly increased muscle tone LUE, but no overt cogwheeling, no clonus   Skin: Skin is warm and dry. She is not diaphoretic. Psychiatric: She has a normal mood and affect.  Her behavior is normal. Judgment and thought content normal.       Additional History     Past Medical History:   Diagnosis Date    Anemia, iron deficiency secondary to bowel resection     Followed by Dr Jas Beltre, gets iron transfusions PRN, not expected to completely normalize    Cervical spondylosis without myelopathy 1/7/2014    Sees Dr. Paul Hughes    Chronic pain syndrome 1/7/2014    Spinal DDD and scoliosis, Sees Dr. Racheal Hicks Collagenous colitis     Dr. Gerri Werner, using enterocort sparingly    Constipation     Dr. Terri Devine DDD (degenerative disc disease), cervical     Sees Dr. Ihsan Esteves Every hypertension     Well-controlled on meds    Gastroparesis     Resolved, was secondary to morphine    Gastroparesis     Resolved, was secondary to morphine     Genital herpes 1988    Pt taking meds    GERD (gastroesophageal reflux disease)     Dr. Gerri Werner    H/O resection of small bowel 2011    Secondary to ischemic gut from adhesions    Hx of breast cancer     Had lumpectomy, sees Dr. Josse Nguyen Hyperlipidemia     Well controlled on low dose statin    Osteoarthritis     Osteopenia     Dr. Katelyn Schererite    Pain in lower jaw 9/3/2014    -- Dr. Diana Duke (dentist)     Peptic ulcer     taking meds     Reactive airway disease     Wheezing after exposure to spray , has albuterol PRN     Past Surgical History:   Procedure Laterality Date    HX BREAST LUMPECTOMY Right september 2007    cancerous    HX COLONOSCOPY  2/21/2008    Collagenous colitis on biopsy - Dr. Barbi Pate HX COLONOSCOPY  2/14/2011    Diverticulosis, internal hemorrhoids - Dr. Barbi Pate [address] RESECTION  07/01/2011    Middle 1/3 of small bowel; caused by scar tissue from hysterectomy, gangrenous small bowel     Social History     Tobacco Use    Smoking status: Never Smoker    Smokeless tobacco: Never Used   Substance Use Topics    Alcohol use: No    Drug use: No     Current Outpatient Medications   Medication Sig    dextromethorphan hbr (ROBITUSSIN PED) 7.5 mg/5 mL Take  by mouth every four (4) hours as needed.  montelukast (SINGULAIR) 10 mg tablet TAKE 1 TABLET BY MOUTH ONCE DAILY    simvastatin (ZOCOR) 10 mg tablet TAKE 1 TABLET BY MOUTH NIGHTLY    lisinopril (PRINIVIL, ZESTRIL) 20 mg tablet TAKE 1 TABLET BY MOUTH ONCE DAILY    meloxicam (MOBIC) 15 mg tablet Take 15 mg by mouth two (2) times a day.  gabapentin (NEURONTIN) 300 mg capsule TAKE 1 CAPSULE BY MOUTH 4 TIMES DAILY    OTHER     diclofenac (VOLTAREN) 1 % gel Apply  to affected area four (4) times daily.  valACYclovir (VALTREX) 1 gram tablet Take  by mouth.  DULoxetine (CYMBALTA) 60 mg capsule Take 1 capsule by mouth daily.  multivitamin (ONE A DAY) tablet Take 1 Tab by mouth daily.  BIOTIN PO Take 1,000 mg by mouth daily.  cyanocobalamin (VITAMIN B12) 500 mcg tablet Take 500 mcg by mouth daily.  ascorbic acid (VITAMIN C) 1,000 mg tablet Take 1,000 mg by mouth three (3) times daily.  cyclobenzaprine (FLEXERIL) 5 mg tablet Take 5 mg by mouth three (3) times daily as needed for Muscle Spasm(s).  aspirin delayed-release 81 mg tablet Take  by mouth daily. No current facility-administered medications for this visit. Allergies   Allergen Reactions    Morphine Other (comments)     Gastroparesis    Neosporin [Benzalkonium Chloride] Rash     Pt reports no allergy    Penicillin G Rash    Sulfa (Sulfonamide Antibiotics) Rash       Encounter Diagnoses:     Encounter Diagnoses     ICD-10-CM ICD-9-CM   1. Essential hypertension I10 401.9   2. Chronic pain syndrome G89.4 338.4   3. Iron deficiency anemia due to chronic blood loss D50.0 280.0   4. Herpes simplex infection of genitourinary system A60.00 008.69     054.79   5. Hx of peptic ulcer Z87.11 V12.71   6. Mixed hyperlipidemia E78.2 272.2   7. Osteopenia of multiple sites M85.89 733.90   8. Mild intermittent reactive airway disease without complication H52.72 147.40   9. Pain in left ear H92.02 388.70   10. Persistent cough R05 786.2   11. Intermittent tremor R25.1 781.0   12. Persistent headaches R51 784. 0            This document may have been created with the aid of dictation software.   Text may contain errors, particularly phonetic errors

## 2019-02-25 ENCOUNTER — TELEPHONE (OUTPATIENT)
Dept: FAMILY MEDICINE CLINIC | Age: 74
End: 2019-02-25

## 2019-02-25 DIAGNOSIS — R05.3 PERSISTENT COUGH FOR 3 WEEKS OR LONGER: Primary | ICD-10-CM

## 2019-02-25 NOTE — TELEPHONE ENCOUNTER
Pt was seen last week for a bad cough. She said since then it has worsened and is wondering if there was something Dr. Chato Lombardi could prescribe her to help with the cough. I told her that she may need to be seen again but she said with how bad she is feeling she is hoping she doesn't have to come in. good minus

## 2019-02-26 RX ORDER — BENZONATATE 200 MG/1
200 CAPSULE ORAL
Qty: 21 CAP | Refills: 0 | Status: SHIPPED | OUTPATIENT
Start: 2019-02-26 | End: 2019-03-05

## 2019-02-26 NOTE — TELEPHONE ENCOUNTER
Please schedule for same day/acute care. If she feels unable to come in this afternoon, she can try tessalon perles. I will send to her pharmacy. Safe to combine tessalon with robitussin.

## 2019-02-26 NOTE — TELEPHONE ENCOUNTER
Pt called again saying that she has been trying to get in touch with Dr. Chino Avina the last couple of days for a cough/ Pt states that she has has this terrible cough for days now and \"cannot go through another night of not sleeping due to this cough\" Pt states that she feels as if she has been \"coughing her ribs up\" and would like a medication for this. Says that she discussed this with Dr. Chino Avina at the last visit and was referred out but it has gotten significantly worse over the weekend. Pt says that she has an appointment on Thursday with a specialist but she does not feel as though she can make it to this appointment based on the way she is currently feeling. Pt states that she has even been wearing a \"binder on her ribs\" because they are that sore.   [phone]

## 2019-02-27 NOTE — TELEPHONE ENCOUNTER
I called the pt to see if she picked up the medication and if she needs to come in to be seen. She said she did take it last night and this morning and she noticed it does help with the cough but it doesn't last very long. It only lasts for about 3 hours she said. She also said she has recently started to cough up a little phlegm since taking the medication. She has an appointment with the ENT tomorrow and she is not sure if she should still go to that. She also wanted to mention that she went to the neurologist and he prescribed her Topamax. She said she has been taking it for 6 nights but noticed a big rash on her back and neck. She has decided to no longer take that medication until she knows that the rash is nothing to worry about. She is going to tell the neurologist as well.

## 2019-02-28 NOTE — TELEPHONE ENCOUNTER
Yes, she should keep her specialist appointments. If cough is not improving over next few days, she should schedule appt here.

## 2019-03-04 ENCOUNTER — TELEPHONE (OUTPATIENT)
Dept: FAMILY MEDICINE CLINIC | Age: 74
End: 2019-03-04

## 2019-03-04 NOTE — TELEPHONE ENCOUNTER
Patient called to report that she saw ENT on Thursday 2-28-19 and states that the ENT advised pt he thought her cough was from BP meds, so she has been off BP meds for 3 days and the cough is better but now BP is up. She reports BP of 168/80  Please advise.

## 2019-03-05 NOTE — TELEPHONE ENCOUNTER
Future Appointments   Date Time Provider Lennie Rodriges   3/8/2019  2:00 PM Edvin Manriquez  [address]

## 2019-03-06 NOTE — PROGRESS NOTES
Blood counts show no signs of systemic infection. Sodium was slightly low, plan to recheck non-fasting labs at next visit. Stay well hydrated (urine should be clear to very pale yellow if hydrating properly)  Testing showed no other clinically significant abnormalities. No changes or additions to treatment recommended at this time.

## 2019-03-08 ENCOUNTER — OFFICE VISIT (OUTPATIENT)
Dept: FAMILY MEDICINE CLINIC | Age: 74
End: 2019-03-08

## 2019-03-08 VITALS
DIASTOLIC BLOOD PRESSURE: 80 MMHG | BODY MASS INDEX: 26.19 KG/M2 | RESPIRATION RATE: 20 BRPM | TEMPERATURE: 97.8 F | HEIGHT: 63 IN | WEIGHT: 147.8 LBS | OXYGEN SATURATION: 97 % | HEART RATE: 84 BPM | SYSTOLIC BLOOD PRESSURE: 150 MMHG

## 2019-03-08 DIAGNOSIS — I10 ESSENTIAL HYPERTENSION: Primary | Chronic | ICD-10-CM

## 2019-03-08 DIAGNOSIS — E78.2 MIXED HYPERLIPIDEMIA: Chronic | ICD-10-CM

## 2019-03-08 DIAGNOSIS — G89.4 CHRONIC PAIN SYNDROME: Chronic | ICD-10-CM

## 2019-03-08 DIAGNOSIS — Z87.11 HX OF PEPTIC ULCER: Chronic | ICD-10-CM

## 2019-03-08 DIAGNOSIS — R25.1 INTERMITTENT TREMOR: Chronic | ICD-10-CM

## 2019-03-08 DIAGNOSIS — R51.9 PERSISTENT HEADACHES: Chronic | ICD-10-CM

## 2019-03-08 RX ORDER — TOPIRAMATE 25 MG/1
25 TABLET ORAL 2 TIMES DAILY
COMMUNITY
End: 2019-09-13 | Stop reason: ALTCHOICE

## 2019-03-08 RX ORDER — OMEPRAZOLE 20 MG/1
20 CAPSULE, DELAYED RELEASE ORAL DAILY
COMMUNITY
End: 2020-01-27 | Stop reason: ALTCHOICE

## 2019-03-08 NOTE — PROGRESS NOTES
PRE-VISIT PLANNING:     PATIENT NAME:  Kika Vargas   :  1945   PCP:  Lexx Flores MD     Future Appointments   Date Time Provider Lennie Rodriges   3/8/2019  2:00 PM Lexx Flores MD [address] is scheduled for an office visit with Carloz Kelly MD on 2019 for evaluation of elevated home BP readings. Encounter opened prior to visit to complete pre-visit planning. Last office visit with PCP was 2019. Pending issues:  -- Has had isolated elevations in BP in the past, may be pain/anxiety related   -- Had neuro eval with Dr. Issa Butts since last visit, started on topamax    There are no preventive care reminders to display for this patient. Rooming Nurse:     -- Check home BP cuff against office cuff reading         Internal Medicine  Acute Care Visit  [facility] [address] [place], [facility], [address] [place]  Phone [phone]; Fax [phone]    Date of Service:  2019  Patient's Name & :   Kika Vargas -    Patient's PCP:  Lexx Flores MD     SUBJECTIVE        Chief Complaint   Patient presents with    Blood Pressure Check       Kika Vargas is a 68 y.o. female presenting with hypertension, recently stopped lisinopril as it may be contributing to dry cough for several months. She describes cough as worsening after her last visit, becoming \"wracking cough\". Was having pain with coughing by the time she saw ENT Dr. Mirella Gr. Notes home BP readings were briefly elevated since stopping ACE I, but now are back to normal range. Cough has improved slowly, still having some cough at night. Would like to monitor off medication given improvement in readings at home.      Brought in readings:    3/1/19 - 134/67  3/2 - 163/78  3/3 - 168/80  3/4 - 19/64  3/5 - 140/59  3/6 - 134/63  3/7 - 131/66    Notes her right shoulder pain has been better with taking gelatin, hasn't required injection in >4mos    Taking topamax from Dr. Neelima Tatum for headaches, had a rash initially, but it resolved and she notes she gets the same rash with steroid injections. Isn't sure yet if HAs are better. ROS: side effects noted by patient include dry cough, and chest wall pain, headaches, tremors;  no TIA's, no dizziness, no fevers, no chills, no chest pain on exertion, no dyspnea on exertion, no swelling of ankles, no orthostatic dizziness or lightheadedness, no palpitations. ASSESSMENT & PLAN    The primary encounter diagnosis was Essential hypertension. Diagnoses of Chronic pain syndrome, Hx of peptic ulcer, Mixed hyperlipidemia, Intermittent tremor, and Persistent headaches were also pertinent to this visit. Hypertension control uncertain, needs further observation, patient will monitor at home. Plan:   03/08/19's treatment plan includes:    Patient to bring in BP log in 1 month (okay to drop off for review)  patient instructed to schedule follow up office visit in 3 months (complete labs 1-2 weeks prior to scheduled visit)  Follow up with neurology re: headaches and tremors  D/w patient that Shingrix vaccine is recommended, but is not currently covered by CMS. Should be added to Medicare part D within the next 2 years. Cost of ~$137/shot (2 shots required). Not currently available, has been on backorder for the better part of the past 14 months since market release. RTC in 6 months for routine follow up, sooner PRN     Patient Instructions   Monitor blood pressure daily for now and bring in home BP log.      Schedule follow up in September     Jenny Pisano MD   03/08/2019 8:33 PM     OBJECTIVE    /80 (BP 1 Location: Left arm, BP Patient Position: Sitting)   Pulse 84   Temp 97.8 °F (36.6 °C) (Oral)   Resp 20   Ht 5' 3\" (1.6 m)   Wt 147 lb 12.8 oz (67 kg)   LMP  (LMP Unknown)   SpO2 97%   BMI 26.18 kg/m²     Appearance:  alert, well appearing, and in no distress, oriented to person, place, and time and normal appearing weight. Cardiovascular:BP noted to be well controlled today in office, S1, S2 normal, no gallop, no murmur, chest clear, no JVD, no HSM, no edema, CVS exam  - normal rate, regular rhythm, normal S1, S2, no murmurs, rubs, clicks or gallops, no peripheral edema  Pulmonary:  Lungs clear, normal effort, no coughing throughout visit today     Additional History   Patient's Past Med Hx, Surg Hx, Soc Hx, Family Hx reviewed. Current Outpatient Medications   Medication Sig    topiramate (TOPAMAX) 25 mg tablet Take 25 mg by mouth two (2) times a day.  miscellaneous medical supply (BLOOD PRESSURE CUFF) misc Hypertension monitoring daily and PRN    omeprazole (PRILOSEC) 20 mg capsule Take 20 mg by mouth daily.  dextromethorphan hbr (ROBITUSSIN PED) 7.5 mg/5 mL Take  by mouth every four (4) hours as needed.  montelukast (SINGULAIR) 10 mg tablet TAKE 1 TABLET BY MOUTH ONCE DAILY    simvastatin (ZOCOR) 10 mg tablet TAKE 1 TABLET BY MOUTH NIGHTLY    meloxicam (MOBIC) 15 mg tablet Take 15 mg by mouth two (2) times a day.  gabapentin (NEURONTIN) 300 mg capsule TAKE 1 CAPSULE BY MOUTH 4 TIMES DAILY    OTHER     diclofenac (VOLTAREN) 1 % gel Apply  to affected area four (4) times daily.  valACYclovir (VALTREX) 1 gram tablet Take  by mouth.  DULoxetine (CYMBALTA) 60 mg capsule Take 1 capsule by mouth daily.  multivitamin (ONE A DAY) tablet Take 1 Tab by mouth daily.  BIOTIN PO Take 1,000 mg by mouth daily.  cyanocobalamin (VITAMIN B12) 500 mcg tablet Take 500 mcg by mouth daily.  ascorbic acid (VITAMIN C) 1,000 mg tablet Take 1,000 mg by mouth three (3) times daily.  cyclobenzaprine (FLEXERIL) 5 mg tablet Take 5 mg by mouth three (3) times daily as needed for Muscle Spasm(s).  aspirin delayed-release 81 mg tablet Take  by mouth daily. No current facility-administered medications for this visit.       Allergies Allergen Reactions    Morphine Other (comments)     Gastroparesis    Neosporin [Benzalkonium Chloride] Rash     Pt reports no allergy    Lisinopril Cough    Penicillin G Rash    Sulfa (Sulfonamide Antibiotics) Rash       Encounter Diagnoses:     Encounter Diagnoses     ICD-10-CM ICD-9-CM   1. Essential hypertension I10 401.9   2. Chronic pain syndrome G89.4 338.4   3. Hx of peptic ulcer Z87.11 V12.71   4. Mixed hyperlipidemia E78.2 272.2   5. Intermittent tremor R25.1 781.0   6.  Persistent headaches R51 784.0

## 2019-03-08 NOTE — PROGRESS NOTES
Aga Clark is a 68 y.o. female (: 1945) presenting to follow up on:    Chief Complaint   Patient presents with    Blood Pressure Check       Vitals:    19 1405   BP: 150/80   Pulse: 84   Resp: 20   Temp: 97.8 °F (36.6 °C)   TempSrc: Oral   SpO2: 97%   Weight: 147 lb 12.8 oz (67 kg)   Height: 5' 3\" (1.6 m)   PainSc:   0 - No pain         Coordination of Care Questionaire:   1. Have you been to the ER, urgent care clinic since your last visit? Hospitalized since your last visit? no    2. Have you seen or consulted any other health care providers outside of the [address] [place] [address] since your last visit? Include any pap smears or colon screening. ENT    Advanced Directive:   1. Do you have an Advanced Directive? Yes    2. Would you like information on Advanced Directives? NO    There are no preventive care reminders to display for this patient.

## 2019-04-08 ENCOUNTER — TELEPHONE (OUTPATIENT)
Dept: FAMILY MEDICINE CLINIC | Age: 74
End: 2019-04-08

## 2019-04-08 DIAGNOSIS — I10 ESSENTIAL HYPERTENSION: Chronic | ICD-10-CM

## 2019-04-08 RX ORDER — AMLODIPINE BESYLATE 2.5 MG/1
2.5 TABLET ORAL DAILY
Qty: 90 TAB | Refills: 0 | Status: SHIPPED | OUTPATIENT
Start: 2019-04-08 | End: 2019-07-09 | Stop reason: SDUPTHER

## 2019-04-08 NOTE — TELEPHONE ENCOUNTER
Home BP readings reviewed. BP average is slightly elevated, recommend starting a low dose of amlodipine once daily. Orders Placed This Encounter    amLODIPine (NORVASC) 2.5 mg tablet     Sig: Take 1 Tab by mouth daily.      Dispense:  90 Tab     Refill:  0

## 2019-07-10 RX ORDER — AMLODIPINE BESYLATE 2.5 MG/1
TABLET ORAL
Qty: 90 TAB | Refills: 0 | Status: SHIPPED | OUTPATIENT
Start: 2019-07-10 | End: 2019-09-13 | Stop reason: SDUPTHER

## 2019-07-10 NOTE — TELEPHONE ENCOUNTER
Last seen 3/8/19    Last filled 4/8/19 qty 90 w/ refills     Future Appointments   Date Time Provider Lennie Rodriges   9/9/2019  1:30 PM Bernie Ventura MD [facility]

## 2019-07-12 ENCOUNTER — PATIENT OUTREACH (OUTPATIENT)
Dept: FAMILY MEDICINE CLINIC | Age: 74
End: 2019-07-12

## 2019-07-12 PROBLEM — K56.609 SBO (SMALL BOWEL OBSTRUCTION) (HCC): Status: ACTIVE | Noted: 2019-07-10

## 2019-07-12 RX ORDER — ACETAMINOPHEN 325 MG/1
650 TABLET ORAL
COMMUNITY
Start: 2019-07-11

## 2019-07-12 NOTE — PROGRESS NOTES
.  Hospital Discharge Follow-Up      Date/Time:  2019 4:11 PM    Patient was admitted to THE [facility] on 2019 and discharged on  for Abdominal pain/SBO. The physician discharge summary was available at the time of outreach. Patient was contacted within 1 business days of discharge. Patient reminded  of Dr Sherly Hogue no longer being with the office. State's she did receive the letter and has chosen Dr Amparo Recio after going on the on line. She has made an appointment for 2019 with her. She did not want to see Dr Yehuda Doshi who her  has prefers female doctors. Patient offered an earlier appointment but she declined. Patient state's she is seeing the surgeon in a week. NN gave patient her direct contact information just incase she needs to make an sooner appointment and told her that NN will be checking in to see how she is doing. Top Challenges reviewed with the provider   Had abdominal surgeries sin the pass. Method of communication with provider :chart routing    Inpatient RRAT score: . Low Risk            8       Total Score        8 Charlson Comorbidity Score (Age + Comorbid Conditions)        Criteria that do not apply:    Has Seen PCP in Last 6 Months (Yes=3, No=0)    . Living with Significant Other. Assisted Living. LTAC. SNF. or   Rehab    Patient Length of Stay (>5 days = 3)    IP Visits Last 12 Months (1-3=4, 4=9, >4=11)    Pt. Coverage (Medicare=5 , Medicaid, or Self-Pay=4)        Was this a readmission? no   Patient stated reason for the readmission: N/A    Nurse Navigator (NN) contacted the patient by telephone to perform post hospital discharge assessment. Verified name and  with patient as identifiers. Provided introduction to self, and explanation of the Nurse Navigator role. Reviewed discharge instructions and red flags with patient who verbalized understanding.  Patient given an opportunity to ask questions and does not have any further questions or concerns at this time. The patient agrees to contact the PCP office for questions related to their healthcare. NN provided contact information for future reference. Disease Specific:   N/A    Summary of patient's top problems:  1. Feels no need for PCP appointment at this time  2. No Bowel movement since discharged       Home Health orders at discharge: None      Durable Medical Equipment ordered/company: None  Durable Medical Equipment received: N/A    Barriers to care? None    Advance Care Planning:   Does patient have an Advance Directive:  not on file     Medication(s):   New Medications at Discharge: TYlenol 650 every 4 hours as needed for pain  Changed Medications at Discharge: None  Discontinued Medications at Discharge: None    Medication reconciliation was performed with patient, who verbalizes understanding of administration of home medications. There were no barriers to obtaining medications identified at this time. Referral to Pharm D needed: no     Current Outpatient Medications   Medication Sig    acetaminophen (TYLENOL) 325 mg tablet Take 650 mg by mouth every four (4) hours as needed.  amLODIPine (NORVASC) 2.5 mg tablet TAKE 1 TABLET BY MOUTH ONCE DAILY    montelukast (SINGULAIR) 10 mg tablet TAKE 1 TABLET BY MOUTH ONCE DAILY    topiramate (TOPAMAX) 25 mg tablet Take 25 mg by mouth two (2) times a day.  miscellaneous medical supply (BLOOD PRESSURE CUFF) misc Hypertension monitoring daily and PRN    omeprazole (PRILOSEC) 20 mg capsule Take 20 mg by mouth daily.  dextromethorphan hbr (ROBITUSSIN PED) 7.5 mg/5 mL Take  by mouth every four (4) hours as needed.  simvastatin (ZOCOR) 10 mg tablet TAKE 1 TABLET BY MOUTH NIGHTLY    meloxicam (MOBIC) 15 mg tablet Take 15 mg by mouth two (2) times a day.  gabapentin (NEURONTIN) 300 mg capsule TAKE 1 CAPSULE BY MOUTH 4 TIMES DAILY    OTHER     diclofenac (VOLTAREN) 1 % gel Apply  to affected area four (4) times daily.     valACYclovir (VALTREX) 1 gram tablet Take  by mouth.  DULoxetine (CYMBALTA) 60 mg capsule Take 1 capsule by mouth daily.  cyclobenzaprine (FLEXERIL) 5 mg tablet Take 5 mg by mouth three (3) times daily as needed for Muscle Spasm(s).  aspirin delayed-release 81 mg tablet Take  by mouth daily.  multivitamin (ONE A DAY) tablet Take 1 Tab by mouth daily.  BIOTIN PO Take 1,000 mg by mouth daily.  cyanocobalamin (VITAMIN B12) 500 mcg tablet Take 500 mcg by mouth daily.  ascorbic acid (VITAMIN C) 1,000 mg tablet Take 1,000 mg by mouth three (3) times daily. No current facility-administered medications for this visit. There are no discontinued medications. [facility] follow up appointment(s):   Future Appointments   Date Time Provider Lennie Rodriges   9/9/2019  1:30 PM Eladio Mae MD [facility]      Non-[facility] follow up appointment(s): n/a  [facility]:  n/a       Goals        Post Hospitalization     Attends follow-up appointments as directed. Keep surgical f/u appointment  Call PCP office if need medical appointment prior to coming in on 9/9/2019 to establish care with new provider Dr Carmenza Pearce complications post hospitalization.  Understands red flags post discharge.       Abdominal, pains N/V   Fever no b/m in 72 hurs

## 2019-07-12 NOTE — Clinical Note
Old patient of Dr Tyrone Ferguson. She is to see you on 9/9/2019 to establish care. The appointment was made prior to admission. She did not want to make an earlier one. Will be seeing the surgeon next week.  If she have any medical problems she will call me to get in early to see you

## 2019-08-01 ENCOUNTER — PATIENT OUTREACH (OUTPATIENT)
Dept: FAMILY MEDICINE CLINIC | Age: 74
End: 2019-08-01

## 2019-08-01 NOTE — PROGRESS NOTES
Transition of care follow Up    Patient Outreached Attempted. Received patient's voicemail box. Message left requesting call back.

## 2019-08-08 ENCOUNTER — PATIENT OUTREACH (OUTPATIENT)
Dept: FAMILY MEDICINE CLINIC | Age: 74
End: 2019-08-08

## 2019-08-15 ENCOUNTER — PATIENT OUTREACH (OUTPATIENT)
Dept: FAMILY MEDICINE CLINIC | Age: 74
End: 2019-08-15

## 2019-08-15 NOTE — PROGRESS NOTES
Patient has graduated from the Transitions of Care Coordination  program on 8/15/19. Patient's symptoms are stable at this time. Patient/family has the ability to self-manage. Care management goals have been completed at this time. No further nurse navigator follow up scheduled. Goals Addressed                 This Visit's Progress     COMPLETED: Attends follow-up appointments as directed. Keep surgical f/u appointment  Call PCP office if need medical appointment prior to coming in on 9/9/2019 to establish care with new provider Dr Priya Valentin: Prevent complications post hospitalization.  COMPLETED: Understands red flags post discharge. Abdominal, pains N/V   Fever no b/m in 72 hurs            Pt has nurse navigator's contact information for any further questions, concerns, or needs.   Patients upcoming visits:    Future Appointments   Date Time Provider Lennie Rodriges   9/9/2019  1:30 PM Sy Deshpande MD [facility] [place]

## 2019-09-04 RX ORDER — SIMVASTATIN 10 MG/1
TABLET, FILM COATED ORAL
Qty: 90 TAB | Refills: 0 | Status: SHIPPED | OUTPATIENT
Start: 2019-09-04 | End: 2019-12-09 | Stop reason: SDUPTHER

## 2019-09-13 ENCOUNTER — OFFICE VISIT (OUTPATIENT)
Dept: FAMILY MEDICINE CLINIC | Age: 74
End: 2019-09-13

## 2019-09-13 ENCOUNTER — TELEPHONE (OUTPATIENT)
Dept: FAMILY MEDICINE CLINIC | Age: 74
End: 2019-09-13

## 2019-09-13 ENCOUNTER — HOSPITAL ENCOUNTER (OUTPATIENT)
Dept: LAB | Age: 74
Discharge: HOME | End: 2019-09-13
Payer: MEDICARE

## 2019-09-13 VITALS
HEART RATE: 89 BPM | DIASTOLIC BLOOD PRESSURE: 79 MMHG | RESPIRATION RATE: 16 BRPM | HEIGHT: 63 IN | SYSTOLIC BLOOD PRESSURE: 139 MMHG | BODY MASS INDEX: 25.45 KG/M2 | TEMPERATURE: 98.2 F | WEIGHT: 143.6 LBS | OXYGEN SATURATION: 95 %

## 2019-09-13 DIAGNOSIS — N17.9 AKI (ACUTE KIDNEY INJURY) (HCC): ICD-10-CM

## 2019-09-13 DIAGNOSIS — K52.831 COLLAGENOUS COLITIS: ICD-10-CM

## 2019-09-13 DIAGNOSIS — I10 ESSENTIAL HYPERTENSION: Chronic | ICD-10-CM

## 2019-09-13 DIAGNOSIS — Z85.3 HX OF BREAST CANCER: Chronic | ICD-10-CM

## 2019-09-13 DIAGNOSIS — N17.9 AKI (ACUTE KIDNEY INJURY) (HCC): Primary | ICD-10-CM

## 2019-09-13 DIAGNOSIS — Z23 ENCOUNTER FOR IMMUNIZATION: ICD-10-CM

## 2019-09-13 PROBLEM — K56.609 SBO (SMALL BOWEL OBSTRUCTION) (HCC): Status: RESOLVED | Noted: 2019-07-10 | Resolved: 2019-09-13

## 2019-09-13 PROBLEM — R51.9 PERSISTENT HEADACHES: Chronic | Status: RESOLVED | Noted: 2019-02-18 | Resolved: 2019-09-13

## 2019-09-13 LAB
ANION GAP SERPL CALC-SCNC: 4 MMOL/L (ref 3–18)
BUN SERPL-MCNC: 11 MG/DL (ref 7–18)
BUN/CREAT SERPL: 10 (ref 12–20)
CALCIUM SERPL-MCNC: 9.2 MG/DL (ref 8.5–10.1)
CHLORIDE SERPL-SCNC: 107 MMOL/L (ref 100–111)
CO2 SERPL-SCNC: 29 MMOL/L (ref 21–32)
CREAT SERPL-MCNC: 1.09 MG/DL (ref 0.6–1.3)
GLUCOSE SERPL-MCNC: 97 MG/DL (ref 74–99)
POTASSIUM SERPL-SCNC: 4.9 MMOL/L (ref 3.5–5.5)
SODIUM SERPL-SCNC: 140 MMOL/L (ref 136–145)

## 2019-09-13 PROCEDURE — 36415 COLL VENOUS BLD VENIPUNCTURE: CPT

## 2019-09-13 PROCEDURE — 80048 BASIC METABOLIC PNL TOTAL CA: CPT

## 2019-09-13 RX ORDER — ADHESIVE BANDAGE
30 BANDAGE TOPICAL
COMMUNITY
Start: 2019-09-13

## 2019-09-13 RX ORDER — VALACYCLOVIR HYDROCHLORIDE 1 G/1
1000 TABLET, FILM COATED ORAL DAILY
Qty: 15 TAB | Refills: 0 | Status: SHIPPED | OUTPATIENT
Start: 2019-09-13 | End: 2019-09-18

## 2019-09-13 RX ORDER — VALACYCLOVIR HYDROCHLORIDE 1 G/1
1000 TABLET, FILM COATED ORAL DAILY
Qty: 15 TAB | Refills: 0 | Status: SHIPPED | OUTPATIENT
Start: 2019-09-13 | End: 2019-09-13 | Stop reason: SDUPTHER

## 2019-09-13 RX ORDER — AMLODIPINE BESYLATE 5 MG/1
5 TABLET ORAL DAILY
Qty: 90 TAB | Refills: 0 | Status: SHIPPED | OUTPATIENT
Start: 2019-09-13 | End: 2019-12-09 | Stop reason: SDUPTHER

## 2019-09-13 RX ORDER — MULTIVIT WITH MINERALS/HERBS
1 TABLET ORAL DAILY
COMMUNITY

## 2019-09-13 NOTE — PROGRESS NOTES
Felicitas Marie is a 69 y.o. female (: 1945) presenting to address:    Felicitas Marie is a 69 y.o. female who presents today for the Influenza vaccine. Patient was afebrile and completed the immunization questionnaire and consent before administration. No adverse reactions noted while in office. Chief Complaint   Patient presents with    Establish Care       Vitals:    19 1118   BP: 139/79   Pulse: 89   Resp: 16   Temp: 98.2 °F (36.8 °C)   TempSrc: Oral   SpO2: 95%   Weight: 143 lb 9.6 oz (65.1 kg)   Height: 5' 3\" (1.6 m)   PainSc:   0 - No pain       Hearing/Vision:   No exam data present    Learning Assessment:     Learning Assessment 10/17/2016   PRIMARY LEARNER Patient   HIGHEST LEVEL OF EDUCATION - PRIMARY LEARNER  GRADUATED HIGH SCHOOL OR GED   BARRIERS PRIMARY LEARNER NONE     NONE   CO-LEARNER CAREGIVER -   PRIMARY LANGUAGE ENGLISH    NEED -   LEARNER PREFERENCE PRIMARY DEMONSTRATION   LEARNING SPECIAL TOPICS -   ANSWERED BY patient   RELATIONSHIP SELF   ASSESSMENT COMMENT -     Depression Screening:     3 most recent PHQ Screens 2019   Little interest or pleasure in doing things Not at all   Feeling down, depressed, irritable, or hopeless Not at all   Total Score PHQ 2 0     Fall Risk Assessment:     Fall Risk Assessment, last 12 mths 2019   Able to walk? Yes   Fall in past 12 months? No     Abuse Screening:     Abuse Screening Questionnaire 2018   Do you ever feel afraid of your partner? N   Are you in a relationship with someone who physically or mentally threatens you? N   Is it safe for you to go home? Y     Coordination of Care Questionaire:   1. Have you been to the ER, urgent care clinic since your last visit? Hospitalized since your last visit? YES [facility] Monet 19 - 19 for small intestinal stricture. 2. Have you seen or consulted any other health care providers outside of the [facility] since your last visit? Include any pap smears or colon screening. YES Dr. Marti Sorto Directive:   1. Do you have an Advanced Directive? YES    2. Would you like information on Advanced Directives?  NO

## 2019-09-13 NOTE — PROGRESS NOTES
Assessment/Plan:    1. Essential hypertension  -incr dose to 5mg. F/u in 2 mos  - amLODIPine (NORVASC) 5 mg tablet; Take 1 Tab by mouth daily. Dispense: 90 Tab; Refill: 0    2. Encounter for immunization  - INFLUENZA VACCINE INACTIVATED (IIV), SUBUNIT, ADJUVANTED, IM  - ADMIN INFLUENZA VIRUS VAC    3. Hx of breast cancer  -up to date with mammo    4. Collagenous colitis  -managed by GI    5. staci - chema labs today      The plan was discussed with the patient. The patient verbalized understanding and is in agreement with the plan. All medication potential side effects were discussed with the patient. Health Maintenance:   Health Maintenance   Topic Date Due    MEDICARE YEARLY EXAM  04/14/2019    Influenza Age 5 to Adult  08/01/2019    Shingrix Vaccine Age 50> (1 of 2) 02/18/2020 (Originally 4/11/1996)    DTaP/Tdap/Td series (1 - Tdap) 09/13/2020 (Originally 4/11/1967)   [facility] Q2Y  07/09/2020    Bone Densitometry  02/01/2021    BREAST CANCER SCRN MAMMOGRAM  08/20/2021    COLONOSCOPY  01/27/2025    Hepatitis C Screening  Completed    Bone Densitometry (Dexa) Screening  Completed    Pneumococcal 65+ years  Completed       Brooklyn Whitman is a 69 y.o. female and presents with Establish Care     Subjective:  Pt of Dr. Jann Gauthier here to establish care. HTN - was changed from lisinopril to norvasc due to cough. bp at home running 540K systolic.     hld - on statin. Doing well. No side effects. Has h/o collagenous colitis. Managed by Dr. Jeniffer Null. ROS:+  Constitutional: No recent weight change. No weakness/+fatigue. No f/c. Cardiovascular: No CP/palpitations. No ONEAL/orthopnea/PND. Respiratory: No cough/sputum, dyspnea, wheezing. Gastointestinal: No dysphagia, reflux. No n/v. +constipation/+diarrhea. No melena/rectal bleeding. Genitourinary: No dysuria, urinary hesitancy, nocturia, hematuria. No incontinence.      The problem list was updated as a part of today's visit. 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 SH:  Social History     Tobacco Use    Smoking status: Never Smoker    Smokeless tobacco: Never Used   Substance Use Topics    Alcohol use: No    Drug use: No       Medications/Allergies:  Current Outpatient Medications on File Prior to Visit   Medication Sig Dispense Refill    ubidecarenone (COQ-10 PO) Take 200 mg by mouth daily.  GELATIN PO Take  by mouth. Indications: Takes twice a day      b complex vitamins (B COMPLEX 1) tablet Take 1 Tab by mouth daily.  glucosamine HCl/chondroitin swan (GLUCOSAMINE-CHONDROITIN PO) Take 1,500 mg by mouth two (2) times a day. Indications: Glucosamine 1500mg / Chondroiton 1200mg      Omega-3 Fatty Acids (FISH OIL) 500 mg cap Take  by mouth.  simvastatin (ZOCOR) 10 mg tablet TAKE 1 TABLET BY MOUTH NIGHTLY 90 Tab 0    acetaminophen (TYLENOL) 325 mg tablet Take 650 mg by mouth every four (4) hours as needed.  amLODIPine (NORVASC) 2.5 mg tablet TAKE 1 TABLET BY MOUTH ONCE DAILY 90 Tab 0    montelukast (SINGULAIR) 10 mg tablet TAKE 1 TABLET BY MOUTH ONCE DAILY 90 Tab 1    omeprazole (PRILOSEC) 20 mg capsule Take 20 mg by mouth daily.  meloxicam (MOBIC) 15 mg tablet Take 15 mg by mouth two (2) times a day.  valACYclovir (VALTREX) 1 gram tablet Take  by mouth.       DULoxetine (CYMBALTA) 60 mg capsule Take 1 capsule by mouth daily. 90 capsule 1    multivitamin (ONE A DAY) tablet Take 1 Tab by mouth daily.  BIOTIN PO Take 1,000 mg by mouth daily.  cyanocobalamin (VITAMIN B12) 500 mcg tablet Take 500 mcg by mouth daily.  ascorbic acid (VITAMIN C) 1,000 mg tablet Take 500 mg by mouth three (3) times daily. No current facility-administered medications on file prior to visit. Allergies   Allergen Reactions    Morphine Other (comments)     Gastroparesis    Neosporin [Benzalkonium Chloride] Rash     Pt reports no allergy    Lisinopril Cough    Penicillin G Rash    Sulfa (Sulfonamide Antibiotics) Rash       Objective:  Visit Vitals  /79 (BP 1 Location: Left arm, BP Patient Position: Sitting)   Pulse 89   Temp 98.2 °F (36.8 °C) (Oral)   Resp 16   Ht 5' 3\" (1.6 m)   Wt 143 lb 9.6 oz (65.1 kg)   LMP  (LMP Unknown)   SpO2 95%   BMI 25.44 kg/m²      Constitutional: Well developed, nourished, no distress, alert   Eyes: Conjunctiva normal. PERRL. Eyelids normal.   CV: S1, S2.  RRR. No murmurs/rubs. No edema. Pulm: No abnormalities on inspection. Clear to auscultation bilaterally. No wheezing/rhonchi. Normal effort. GI: Soft, nontender, nondistended. Normal active bowel sounds.

## 2019-10-23 NOTE — TELEPHONE ENCOUNTER
Requested Prescriptions     Pending Prescriptions Disp Refills    montelukast (SINGULAIR) 10 mg tablet 90 Tab 1     Patient calling to request refill on: 10.23.19      Remaining pills: will be out 11/5  Last appt: 9.13.19  Next appt: 12.13.19    Pharmacy: [address] [place] [address]       Patient aware of 72 hour time frame. Pt is requesting for an early refill because she will be leaving on 10/30 and will not be back until 11/20 and her rx is due to run out on 11/5.  Please advise

## 2019-10-24 RX ORDER — MONTELUKAST SODIUM 10 MG/1
TABLET ORAL
Qty: 90 TAB | Refills: 1 | Status: SHIPPED | OUTPATIENT
Start: 2019-10-24 | End: 2020-04-28

## 2019-12-09 ENCOUNTER — OFFICE VISIT (OUTPATIENT)
Dept: FAMILY MEDICINE CLINIC | Age: 74
End: 2019-12-09

## 2019-12-09 VITALS
BODY MASS INDEX: 25.16 KG/M2 | HEART RATE: 80 BPM | SYSTOLIC BLOOD PRESSURE: 140 MMHG | TEMPERATURE: 97.5 F | HEIGHT: 63 IN | RESPIRATION RATE: 16 BRPM | OXYGEN SATURATION: 100 % | WEIGHT: 142 LBS | DIASTOLIC BLOOD PRESSURE: 70 MMHG

## 2019-12-09 DIAGNOSIS — I10 ESSENTIAL HYPERTENSION: Primary | ICD-10-CM

## 2019-12-09 RX ORDER — AMLODIPINE BESYLATE 5 MG/1
7.5 TABLET ORAL DAILY
Qty: 135 TAB | Refills: 3 | Status: SHIPPED | OUTPATIENT
Start: 2019-12-09 | End: 2020-12-01

## 2019-12-09 RX ORDER — AMLODIPINE BESYLATE 5 MG/1
5 TABLET ORAL DAILY
Qty: 90 TAB | Refills: 3 | Status: SHIPPED | OUTPATIENT
Start: 2019-12-09 | End: 2019-12-09

## 2019-12-09 RX ORDER — SIMVASTATIN 10 MG/1
TABLET, FILM COATED ORAL
Qty: 90 TAB | Refills: 3 | Status: SHIPPED | OUTPATIENT
Start: 2019-12-09 | End: 2020-12-10

## 2019-12-09 NOTE — PROGRESS NOTES
Assessment/Plan:    1. Essential hypertension  -incr dose to 7.5mg. f/u next month. - amLODIPine (NORVASC) 5 mg tablet; Take 1.5 Tabs by mouth daily. Dispense: 135 Tab; Refill: 3      The plan was discussed with the patient. The patient verbalized understanding and is in agreement with the plan. All medication potential side effects were discussed with the patient. Health Maintenance:   Health Maintenance   Topic Date Due    MEDICARE YEARLY EXAM  04/14/2019    Shingrix Vaccine Age 50> (1 of 2) 02/18/2020 (Originally 4/11/1996)    DTaP/Tdap/Td series (1 - Tdap) 09/13/2020 (Originally 4/11/1956)    GLAUCOMA SCREENING Q2Y  07/09/2020    Bone Densitometry  02/01/2021    BREAST CANCER SCRN MAMMOGRAM  08/20/2021    COLONOSCOPY  01/27/2025    Hepatitis C Screening  Completed    Bone Densitometry (Dexa) Screening  Completed    Influenza Age 5 to Adult  Completed    Pneumococcal 65+ years  Completed       Dorothy Phalen is a 69 y.o. female and presents with Hypertension (follow up)     Subjective:  htn - norvasc increasedlast visit. bp is improved. bp at home is running 659-499M systolic. However, bp cuff today reading 20 points higher than our readings. ROS:  Constitutional: No recent weight change. No weakness/fatigue. No f/c. Cardiovascular: No CP/palpitations. No ONEAL/orthopnea/PND. Respiratory: No cough/sputum, dyspnea, wheezing. Gastointestinal: No dysphagia, reflux. No n/v. No constipation/diarrhea. No melena/rectal bleeding. The problem list was updated as a part of today's visit.   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 SH:  Social History     Tobacco Use    Smoking status: Never Smoker    Smokeless tobacco: Never Used   Substance Use Topics    Alcohol use: No    Drug use: No       Medications/Allergies:  Current Outpatient Medications on File Prior to Visit   Medication Sig Dispense Refill    montelukast (SINGULAIR) 10 mg tablet TAKE 1 TABLET BY MOUTH ONCE DAILY 90 Tab 1    ubidecarenone (COQ-10 PO) Take 200 mg by mouth daily.  GELATIN PO Take  by mouth. Indications: Takes twice a day      b complex vitamins (B COMPLEX 1) tablet Take 1 Tab by mouth daily.  glucosamine HCl/chondroitin swan (GLUCOSAMINE-CHONDROITIN PO) Take 1,500 mg by mouth two (2) times a day. Indications: Glucosamine 1500mg / Chondroiton 1200mg      Omega-3 Fatty Acids (FISH OIL) 500 mg cap Take  by mouth.  amLODIPine (NORVASC) 5 mg tablet Take 1 Tab by mouth daily. 90 Tab 0    magnesium hydroxide (MILK OF MAGNESIA) 400 mg/5 mL suspension Take 30 mL by mouth.  simvastatin (ZOCOR) 10 mg tablet TAKE 1 TABLET BY MOUTH NIGHTLY 90 Tab 0    acetaminophen (TYLENOL) 325 mg tablet Take 650 mg by mouth every four (4) hours as needed.  omeprazole (PRILOSEC) 20 mg capsule Take 20 mg by mouth daily.  meloxicam (MOBIC) 15 mg tablet Take 15 mg by mouth two (2) times a day.  DULoxetine (CYMBALTA) 60 mg capsule Take 1 capsule by mouth daily. 90 capsule 1    multivitamin (ONE A DAY) tablet Take 1 Tab by mouth daily.  BIOTIN PO Take 1,000 mg by mouth daily.  cyanocobalamin (VITAMIN B12) 500 mcg tablet Take 500 mcg by mouth daily.  ascorbic acid (VITAMIN C) 1,000 mg tablet Take 500 mg by mouth three (3) times daily.        No current facility-administered medications on file prior to visit. Allergies   Allergen Reactions    Morphine Other (comments)     Gastroparesis    Neosporin [Benzalkonium Chloride] Rash     Pt reports no allergy    Lisinopril Cough    Penicillin G Rash    Sulfa (Sulfonamide Antibiotics) Rash       Objective:  Visit Vitals  /66 (BP 1 Location: Left arm, BP Patient Position: Sitting)   Pulse 80   Temp 97.5 °F (36.4 °C) (Oral)   Resp 16   Ht 5' 3\" (1.6 m)   Wt 142 lb (64.4 kg)   LMP  (LMP Unknown)   SpO2 100%   BMI 25.15 kg/m²      Constitutional: Well developed, nourished, no distress, alert   CV: S1, S2.  RRR. No murmurs/rubs. No edema. Pulm: No abnormalities on inspection. Clear to auscultation bilaterally. No wheezing/rhonchi. Normal effort. GI: Soft, nontender, nondistended. Normal active bowel sounds.

## 2019-12-09 NOTE — PROGRESS NOTES
Bonita Newton is a 69 y.o. female (: 1945) presenting to address:    Chief Complaint   Patient presents with    Hypertension     follow up       Vitals:    19 1100   BP: 124/66   Pulse: 80   Resp: 16   Temp: 97.5 °F (36.4 °C)   TempSrc: Oral   SpO2: 100%   Weight: 142 lb (64.4 kg)   Height: 5' 3\" (1.6 m)   PainSc:   0 - No pain       Hearing/Vision:   No exam data present    Learning Assessment:     Learning Assessment 10/17/2016   PRIMARY LEARNER Patient   HIGHEST LEVEL OF EDUCATION - PRIMARY LEARNER  GRADUATED HIGH SCHOOL OR GED   BARRIERS PRIMARY LEARNER NONE     NONE   CO-LEARNER CAREGIVER -   PRIMARY LANGUAGE ENGLISH    NEED -   LEARNER PREFERENCE PRIMARY DEMONSTRATION   LEARNING SPECIAL TOPICS -   ANSWERED BY patient   RELATIONSHIP SELF   ASSESSMENT COMMENT -     Depression Screening:     3 most recent PHQ Screens 2019   Little interest or pleasure in doing things Not at all   Feeling down, depressed, irritable, or hopeless Not at all   Total Score PHQ 2 0     Fall Risk Assessment:     Fall Risk Assessment, last 12 mths 2019   Able to walk? Yes   Fall in past 12 months? No     Abuse Screening:     Abuse Screening Questionnaire 2018   Do you ever feel afraid of your partner? N   Are you in a relationship with someone who physically or mentally threatens you? N   Is it safe for you to go home? Y     Coordination of Care Questionaire:   1. Have you been to the ER, urgent care clinic since your last visit? Hospitalized since your last visit? YES    2. Have you seen or consulted any other health care providers outside of the [address] [place] [address] since your last visit? Include any pap smears or colon screening. NO NO    Advanced Directive:   1. Do you have an Advanced Directive? YES    2. Would you like information on Advanced Directives?  NO

## 2020-01-27 ENCOUNTER — OFFICE VISIT (OUTPATIENT)
Dept: FAMILY MEDICINE CLINIC | Age: 75
End: 2020-01-27

## 2020-01-27 ENCOUNTER — HOSPITAL ENCOUNTER (OUTPATIENT)
Dept: LAB | Age: 75
Discharge: HOME | End: 2020-01-27
Payer: MEDICARE

## 2020-01-27 VITALS
WEIGHT: 143 LBS | SYSTOLIC BLOOD PRESSURE: 122 MMHG | TEMPERATURE: 98 F | DIASTOLIC BLOOD PRESSURE: 69 MMHG | HEIGHT: 63 IN | HEART RATE: 86 BPM | RESPIRATION RATE: 16 BRPM | BODY MASS INDEX: 25.34 KG/M2 | OXYGEN SATURATION: 99 %

## 2020-01-27 DIAGNOSIS — E78.2 MIXED HYPERLIPIDEMIA: Chronic | ICD-10-CM

## 2020-01-27 DIAGNOSIS — I10 ESSENTIAL HYPERTENSION: Chronic | ICD-10-CM

## 2020-01-27 DIAGNOSIS — J06.9 VIRAL UPPER RESPIRATORY TRACT INFECTION: ICD-10-CM

## 2020-01-27 DIAGNOSIS — B37.9 CANDIDA INFECTION: ICD-10-CM

## 2020-01-27 DIAGNOSIS — I10 ESSENTIAL HYPERTENSION: Primary | Chronic | ICD-10-CM

## 2020-01-27 DIAGNOSIS — Z01.810 PREOP CARDIOVASCULAR EXAM: ICD-10-CM

## 2020-01-27 LAB
ALBUMIN SERPL-MCNC: 4.2 G/DL (ref 3.4–5)
ALBUMIN/GLOB SERPL: 1.4 {RATIO} (ref 0.8–1.7)
ALP SERPL-CCNC: 84 U/L (ref 45–117)
ALT SERPL-CCNC: 35 U/L (ref 13–56)
ANION GAP SERPL CALC-SCNC: 3 MMOL/L (ref 3–18)
AST SERPL-CCNC: 30 U/L (ref 10–38)
BILIRUB SERPL-MCNC: 0.4 MG/DL (ref 0.2–1)
BUN SERPL-MCNC: 14 MG/DL (ref 7–18)
BUN/CREAT SERPL: 11 (ref 12–20)
CALCIUM SERPL-MCNC: 9.4 MG/DL (ref 8.5–10.1)
CHLORIDE SERPL-SCNC: 105 MMOL/L (ref 100–111)
CHOLEST SERPL-MCNC: 182 MG/DL
CO2 SERPL-SCNC: 30 MMOL/L (ref 21–32)
CREAT SERPL-MCNC: 1.23 MG/DL (ref 0.6–1.3)
ERYTHROCYTE [DISTWIDTH] IN BLOOD BY AUTOMATED COUNT: 14.3 % (ref 11.6–14.5)
GLOBULIN SER CALC-MCNC: 3 G/DL (ref 2–4)
GLUCOSE SERPL-MCNC: 77 MG/DL (ref 74–99)
HCT VFR BLD AUTO: 40.8 % (ref 35–45)
HDLC SERPL-MCNC: 105 MG/DL (ref 40–60)
HDLC SERPL: 1.7 {RATIO} (ref 0–5)
HGB BLD-MCNC: 13.1 G/DL (ref 12–16)
LDLC SERPL CALC-MCNC: 56.8 MG/DL (ref 0–100)
LIPID PROFILE,FLP: ABNORMAL
MCH RBC QN AUTO: 29.4 PG (ref 24–34)
MCHC RBC AUTO-ENTMCNC: 32.1 G/DL (ref 31–37)
MCV RBC AUTO: 91.5 FL (ref 74–97)
PLATELET # BLD AUTO: 302 K/UL (ref 135–420)
PMV BLD AUTO: 11.5 FL (ref 9.2–11.8)
POTASSIUM SERPL-SCNC: 4.4 MMOL/L (ref 3.5–5.5)
PROT SERPL-MCNC: 7.2 G/DL (ref 6.4–8.2)
RBC # BLD AUTO: 4.46 M/UL (ref 4.2–5.3)
SODIUM SERPL-SCNC: 138 MMOL/L (ref 136–145)
TRIGL SERPL-MCNC: 101 MG/DL (ref ?–150)
VLDLC SERPL CALC-MCNC: 20.2 MG/DL
WBC # BLD AUTO: 5.4 K/UL (ref 4.6–13.2)

## 2020-01-27 PROCEDURE — 36415 COLL VENOUS BLD VENIPUNCTURE: CPT

## 2020-01-27 PROCEDURE — 85027 COMPLETE CBC AUTOMATED: CPT

## 2020-01-27 PROCEDURE — 80061 LIPID PANEL: CPT

## 2020-01-27 PROCEDURE — 80053 COMPREHEN METABOLIC PANEL: CPT

## 2020-01-27 RX ORDER — PRENATAL VIT 91/IRON/FOLIC/DHA 28-975-200
COMBINATION PACKAGE (EA) ORAL
Qty: 30 G | Refills: 0 | Status: SHIPPED | OUTPATIENT
Start: 2020-01-27

## 2020-01-27 RX ORDER — CYCLOBENZAPRINE HCL 5 MG
5 TABLET ORAL
COMMUNITY

## 2020-01-27 NOTE — PROGRESS NOTES
Ar Velasco is a 69 y.o. female (: 1945) presenting to address:    Chief Complaint   Patient presents with    Pre-op Exam    Annual Wellness Visit     [address]       Vitals:    20 1113   BP: 122/69   Pulse: 86   Resp: 16   Temp: 98 °F (36.7 °C)   TempSrc: Oral   SpO2: 99%   Weight: 143 lb (64.9 kg)   Height: 5' 3\" (1.6 m)       Hearing/Vision:   No exam data present    Learning Assessment:     Learning Assessment 10/17/2016   PRIMARY LEARNER Patient   HIGHEST LEVEL OF EDUCATION - PRIMARY LEARNER  GRADUATED HIGH SCHOOL OR GED   BARRIERS PRIMARY LEARNER NONE     NONE   CO-LEARNER CAREGIVER -   PRIMARY LANGUAGE ENGLISH    NEED -   LEARNER PREFERENCE PRIMARY DEMONSTRATION   LEARNING SPECIAL TOPICS -   ANSWERED BY patient   RELATIONSHIP SELF   ASSESSMENT COMMENT -     Depression Screening:     3 most recent PHQ Screens 2020   Little interest or pleasure in doing things Not at all   Feeling down, depressed, irritable, or hopeless Not at all   Total Score PHQ 2 0     Fall Risk Assessment:     Fall Risk Assessment, last 12 mths 2020   Able to walk? Yes   Fall in past 12 months? No     Abuse Screening:     Abuse Screening Questionnaire 2020   Do you ever feel afraid of your partner? N   Are you in a relationship with someone who physically or mentally threatens you? N   Is it safe for you to go home? Y     Coordination of Care Questionaire:   1. Have you been to the ER, urgent care clinic since your last visit? Hospitalized since your last visit? NO    2. Have you seen or consulted any other health care providers outside of the [address] [place] [address] since your last visit? Include any pap smears or colon screening. NO    Advanced Directive:   1. Do you have an Advanced Directive? YES    2. Would you like information on Advanced Directives?  NO

## 2020-01-27 NOTE — PROGRESS NOTES
Pj Harrington is a 69 y.o. female and presents for pre-operative evaluation. Subjective: This patient was seen at the request of Dr. Rafa Valentin for pre-operative evaluation for planned procedure: cataract surgery on 2/6/20 under topical with MAC anesthesia. Pt c/o URI - has nasal congestion x 4 d. No  sinus pain. No f/c. No cough. +fatigue. She also c/o inguinal lymph nodes that started after having a \"reaction\" to a cream prescribed by derm- ketoconazole. Was told it was a fungal reaction. States she has had ketoconazole prior w/o issues. This was subsequently changed to nystatin and triamcinolone. That didn't help, so she self-treated with betamethasone, which improved/resolved the \"reaction\". She continues to have candida in bilat axilla. Cardiac factors include:  HTN  HLD    METs: 4    ROS:  Constitutional: No recent weight change. No weakness/+fatigue. No f/c. Skin: + rashes, no change in nails/hair, itching   HENT: No HA, dizziness. No hearing loss/tinnitus.  + nasal congestion/discharge. Eyes: No change in vision, double/blurred vision or eye pain/redness. Cardiovascular: No CP/palpitations. No ONEAL/orthopnea/PND. Respiratory: No cough/sputum, dyspnea, wheezing. Gastointestinal: No dysphagia, reflux. No n/v. No constipation/diarrhea. No melena/rectal bleeding. Genitourinary: No dysuria, urinary hesitancy, nocturia, hematuria. No incontinence. Musculoskeletal: No joint pain/stiffness. No muscle pain/tenderness. Heme: No h/o anemia. No easy bleeding/bruising. Neurological: No seizures/numbness/weakness. No paresthesias.      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 Karen Chaudhary HX COLONOSCOPY  2/14/2011    Diverticulosis, internal hemorrhoids - Dr. Karen Chaudhary HX PARTIAL HYSTERECTOMY      [address] RESECTION  07/01/2011    Middle 1/3 of small bowel; caused by scar tissue from hysterectomy, gangrenous small bowel        SH:  Social History     Tobacco Use    Smoking status: Never Smoker    Smokeless tobacco: Never Used   Substance Use Topics    Alcohol use: No    Drug use: No       FH:  Family History   Problem Relation Age of Onset    Cancer Mother        Medications/Allergies:  Current Outpatient Medications on File Prior to Visit   Medication Sig Dispense Refill    simvastatin (ZOCOR) 10 mg tablet TAKE 1 TABLET BY MOUTH NIGHTLY  Indications: treatment to slow progression of coronary artery disease 90 Tab 3    amLODIPine (NORVASC) 5 mg tablet Take 1.5 Tabs by mouth daily. 135 Tab 3    montelukast (SINGULAIR) 10 mg tablet TAKE 1 TABLET BY MOUTH ONCE DAILY 90 Tab 1    ubidecarenone (COQ-10 PO) Take 200 mg by mouth daily.  GELATIN PO Take  by mouth. Indications: Takes twice a day      b complex vitamins (B COMPLEX 1) tablet Take 1 Tab by mouth daily.  glucosamine HCl/chondroitin swan (GLUCOSAMINE-CHONDROITIN PO) Take 1,500 mg by mouth two (2) times a day. Indications: Glucosamine 1500mg / Chondroiton 1200mg      Omega-3 Fatty Acids (FISH OIL) 500 mg cap Take  by mouth.  magnesium hydroxide (MILK OF MAGNESIA) 400 mg/5 mL suspension Take 30 mL by mouth.       acetaminophen (TYLENOL) 325 mg tablet Take 650 mg by mouth every four (4) hours as needed.  omeprazole (PRILOSEC) 20 mg capsule Take 20 mg by mouth daily.  meloxicam (MOBIC) 15 mg tablet Take 15 mg by mouth two (2) times a day.  DULoxetine (CYMBALTA) 60 mg capsule Take 1 capsule by mouth daily. 90 capsule 1    multivitamin (ONE A DAY) tablet Take 1 Tab by mouth daily.  BIOTIN PO Take 1,000 mg by mouth daily.  cyanocobalamin (VITAMIN B12) 500 mcg tablet Take 500 mcg by mouth daily.  ascorbic acid (VITAMIN C) 1,000 mg tablet Take 500 mg by mouth three (3) times daily. No current facility-administered medications on file prior to visit. Allergies   Allergen Reactions    Morphine Other (comments)     Gastroparesis    Neosporin [Benzalkonium Chloride] Rash     Pt reports no allergy    Lisinopril Cough    Penicillin G Rash    Sulfa (Sulfonamide Antibiotics) Rash       Objective:  Visit Vitals  /69 (BP 1 Location: Left arm, BP Patient Position: Sitting)   Pulse 86   Temp 98 °F (36.7 °C) (Oral)   Resp 16   Ht 5' 3\" (1.6 m)   Wt 143 lb (64.9 kg)   LMP  (LMP Unknown)   SpO2 99%   BMI 25.33 kg/m²      Gen: Well developed, nourished, no distress, alert   HENT: Exterior ears and tympanic membranes normal bilaterally. Supple neck. No thyromegaly or lymphadenopathy. Oropharynx clear and moist mucous membranes. Eyes: Conjunctiva normal. PERRL. CV: S1, S2.  RRR. No murmurs/rubs. No thrills palpated. No carotid bruits. Intact distal pulses. No edema. Pulm: No abnormalities on inspection. Clear to auscultation bilaterally. No wheezing/rhonchi. Normal effort. GI: Soft, nontender, nondistended. Normal active bowel sounds. No  masses on palpation. No hepatosplenomegaly. Neuro: A/O x 3. No focal motor or sensory deficits. Speech normal. +intention tremor   Psych: Appropriate affect, judgement and insight. Short-term memory intact.      Skin: candida bilat axilla, R>>L    Assessment/Plan:    The patient is low risk for planned procedure and may proceed to OR without further testing. The following recommendations were made for medication regimen prior to surgery:  Continue taking HTN meds prior to surgery. I will continue to follow along with the patient's treatment and care. For any questions please do not hesitate to call the office at [phone]. Lord Wanda MD    Will treat candida in axilla with terbinafine given intol ketoconazole. Viral URI - no indication for antibiotics. Cont otc meds.

## 2020-01-28 PROBLEM — N18.30 STAGE 3 CHRONIC KIDNEY DISEASE (HCC): Status: ACTIVE | Noted: 2020-01-28

## 2020-03-02 ENCOUNTER — OFFICE VISIT (OUTPATIENT)
Dept: FAMILY MEDICINE CLINIC | Age: 75
End: 2020-03-02

## 2020-03-02 VITALS
DIASTOLIC BLOOD PRESSURE: 78 MMHG | HEIGHT: 63 IN | RESPIRATION RATE: 16 BRPM | HEART RATE: 84 BPM | BODY MASS INDEX: 25.16 KG/M2 | OXYGEN SATURATION: 100 % | SYSTOLIC BLOOD PRESSURE: 130 MMHG | TEMPERATURE: 97.8 F | WEIGHT: 142 LBS

## 2020-03-02 DIAGNOSIS — N18.30 STAGE 3 CHRONIC KIDNEY DISEASE (HCC): ICD-10-CM

## 2020-03-02 DIAGNOSIS — Z78.9 FULL CODE STATUS: ICD-10-CM

## 2020-03-02 DIAGNOSIS — Z00.00 MEDICARE ANNUAL WELLNESS VISIT, SUBSEQUENT: Primary | ICD-10-CM

## 2020-03-02 DIAGNOSIS — I10 ESSENTIAL HYPERTENSION: ICD-10-CM

## 2020-03-02 DIAGNOSIS — E78.2 MIXED HYPERLIPIDEMIA: Chronic | ICD-10-CM

## 2020-03-02 NOTE — PROGRESS NOTES
This is the Subsequent Medicare Annual Wellness Exam, performed 12 months or more after the Initial AWV or the last Subsequent AWV    I have reviewed the patient's medical history in detail and updated the computerized patient record.      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 oJb Abdullahi    Chronic pain syndrome 1/7/2014    Spinal DDD and scoliosis, Sees Dr. Effie Mccord colitis     Dr. Mejia Suarez, using enterocort sparingly    Constipation     Dr. Mejia Suarez    DDD (degenerative disc disease), cervical     Sees Dr. Nell Singh hypertension     Well-controlled on meds    Gastroparesis     Resolved, was secondary to morphine    Gastroparesis     Resolved, was secondary to morphine     Genital herpes 1988    Pt taking meds    GERD (gastroesophageal reflux disease)     Dr. Mejia Suarez    H/O resection of small bowel 2011    Secondary to ischemic gut from adhesions    Hx of breast cancer     Had lumpectomy, sees Dr. Ivelisse Pisano Hyperlipidemia     Well controlled on low dose statin    Osteoarthritis     Osteopenia     Dr. Hinton Pore    Pain in lower jaw 9/3/2014    -- Dr. Lucas Schmidt (dentist)    [facility] Peptic ulcer     taking meds     Reactive airway disease     Wheezing after exposure to spray , has albuterol PRN      Past Surgical History:   Procedure Laterality Date    HX BREAST LUMPECTOMY Right september 2007    cancerous    HX COLONOSCOPY  2/21/2008    Collagenous colitis on biopsy - Dr. Bharat Mensah HX COLONOSCOPY  2/14/2011    Diverticulosis, internal hemorrhoids - Dr. Bharat Mensah [address] RESECTION  07/01/2011    Middle 1/3 of small bowel; caused by scar tissue from hysterectomy, gangrenous small bowel     Current Outpatient Medications   Medication Sig Dispense Refill    cyclobenzaprine (FLEXERIL) 5 mg tablet Take 5 mg by mouth.  terbinafine HCl (LAMISIL) 1 % topical cream Apply pea-sized amount bid x 10-14d to axilla 30 g 0    simvastatin (ZOCOR) 10 mg tablet TAKE 1 TABLET BY MOUTH NIGHTLY  Indications: treatment to slow progression of coronary artery disease 90 Tab 3    amLODIPine (NORVASC) 5 mg tablet Take 1.5 Tabs by mouth daily. 135 Tab 3    montelukast (SINGULAIR) 10 mg tablet TAKE 1 TABLET BY MOUTH ONCE DAILY 90 Tab 1    ubidecarenone (COQ-10 PO) Take 200 mg by mouth daily.  GELATIN PO Take  by mouth. Indications: Takes twice a day      b complex vitamins (B COMPLEX 1) tablet Take 1 Tab by mouth daily.  glucosamine HCl/chondroitin swan (GLUCOSAMINE-CHONDROITIN PO) Take 1,500 mg by mouth two (2) times a day. Indications: Glucosamine 1500mg / Chondroiton 1200mg      Omega-3 Fatty Acids (FISH OIL) 500 mg cap Take  by mouth.  magnesium hydroxide (MILK OF MAGNESIA) 400 mg/5 mL suspension Take 30 mL by mouth.  acetaminophen (TYLENOL) 325 mg tablet Take 650 mg by mouth every four (4) hours as needed.  meloxicam (MOBIC) 15 mg tablet Take 15 mg by mouth two (2) times a day.       DULoxetine (CYMBALTA) 60 mg capsule Take 1 capsule by mouth daily. 90 capsule 1    multivitamin (ONE A DAY) tablet Take 1 Tab by mouth daily.  BIOTIN PO Take 1,000 mg by mouth daily.  cyanocobalamin (VITAMIN B12) 500 mcg tablet Take 500 mcg by mouth daily.  ascorbic acid (VITAMIN C) 1,000 mg tablet Take 500 mg by mouth three (3) times daily. Allergies   Allergen Reactions    Ciprofloxacin Unknown (comments)    Morphine Other (comments)     Gastroparesis    Neosporin [Benzalkonium Chloride] Rash     Pt reports no allergy    Lisinopril Cough    Penicillin G Rash    Sulfa (Sulfonamide Antibiotics) Rash       Family History   Problem Relation Age of Onset    Cancer Mother      Social History     Tobacco Use    Smoking status: Never Smoker    Smokeless tobacco: Never Used   Substance Use Topics    Alcohol use: No       Depression Risk Factor Screening:     3 most recent PHQ Screens 1/27/2020   Little interest or pleasure in doing things Not at all   Feeling down, depressed, irritable, or hopeless Not at all   Total Score PHQ 2 0       Alcohol Risk Factor Screening:   Do you average 1 drink per night or more than 7 drinks a week:  No    On any one occasion in the past three months have you have had more than 3 drinks containing alcohol:  No      Functional Ability and Level of Safety:   Hearing: Hearing is good. Activities of Daily Living: The home contains: no safety equipment. Patient does total self care    Ambulation: with no difficulty    Fall Risk:  Fall Risk Assessment, last 12 mths 1/27/2020   Able to walk? Yes   Fall in past 12 months? No       Abuse Screen:  Patient is not abused    Cognitive Screening   Has your family/caregiver stated any concerns about your memory: no  Cognitive Screening: .     Patient Care Team   Patient Care Team:  Nakul Salamanca MD as PCP - General (Internal Medicine)  Nakul Salamanca MD as PCP - [facility] Empaneled Provider  Jessenia Meehan MD as Consulting Provider (Gastroenterology)  Avery Valladares MD as Consulting Provider (Rheumatology)  Leonidas Kang MD as Consulting Provider (Orthopedic Surgery)  Daniela Aranda MD as Consulting Provider (Oncology)  Dougie Forde MD as Consulting Provider (Dermatology)  Zraia Webb MD as Consulting Provider (Pain Management)  Denny Rangel MD as Consulting Provider (Optometry)  Alicia Chowdhury MD as Consulting Provider (Neurology)    Assessment/Plan   Education and counseling provided:  Are appropriate based on today's review and evaluation    Diagnoses and all orders for this visit:    1. Medicare annual wellness visit, subsequent    2. Stage 3 chronic kidney disease ([facility] [address]- labs stable. 3. Essential hypertension    4. Mixed hyperlipidemia        There are no preventive care reminders to display for this patient.

## 2020-03-02 NOTE — PATIENT INSTRUCTIONS
Medicare Wellness Visit, Female     The best way to live healthy is to have a lifestyle where you eat a well-balanced diet, exercise regularly, limit alcohol use, and quit all forms of tobacco/nicotine, if applicable. Regular preventive services are another way to keep healthy. Preventive services (vaccines, screening tests, monitoring & exams) can help personalize your care plan, which helps you manage your own care. Screening tests can find health problems at the earliest stages, when they are easiest to treat. [facility] follows the current, evidence-based guidelines published by the [facility] Aleksey Paula ([facility]) when recommending preventive services for our patients. Because we follow these guidelines, sometimes recommendations change over time as research supports it. (For example, mammograms used to be recommended annually. Even though Medicare will still pay for an annual mammogram, the newer guidelines recommend a mammogram every two years for women of average risk). Of course, you and your doctor may decide to screen more often for some diseases, based on your risk and your co-morbidities (chronic disease you are already diagnosed with). Preventive services for you include:  - Medicare offers their members a free annual wellness visit, which is time for you and your primary care provider to discuss and plan for your preventive service needs. Take advantage of this benefit every year!  -All adults over the age of 72 should receive the recommended pneumonia vaccines. Current USPSTF guidelines recommend a series of two vaccines for the best pneumonia protection.   -All adults should have a flu vaccine yearly and a tetanus vaccine every 10 years.   -All adults age 48 and older should receive the shingles vaccines (series of two vaccines).       -All adults age 38-68 who are overweight should have a diabetes screening test once every three years.   -All adults born between [birth year] and 1965 should be screened once for Hepatitis C.  -Other screening tests and preventive services for persons with diabetes include: an eye exam to screen for diabetic retinopathy, a kidney function test, a foot exam, and stricter control over your cholesterol.   -Cardiovascular screening for adults with routine risk involves an electrocardiogram (ECG) at intervals determined by your doctor.   -Colorectal cancer screenings should be done for adults age 54-65 with no increased risk factors for colorectal cancer. There are a number of acceptable methods of screening for this type of cancer. Each test has its own benefits and drawbacks. Discuss with your doctor what is most appropriate for you during your annual wellness visit. The different tests include: colonoscopy (considered the best screening method), a fecal occult blood test, a fecal DNA test, and sigmoidoscopy.    -A bone mass density test is recommended when a woman turns 65 to screen for osteoporosis. This test is only recommended one time, as a screening. Some providers will use this same test as a disease monitoring tool if you already have osteoporosis. -Breast cancer screenings are recommended every other year for women of normal risk, age 54-69.  -Cervical cancer screenings for women over age 72 are only recommended with certain risk factors.      Here is a list of your current Health Maintenance items (your personalized list of preventive services) with a due date:  Health Maintenance Due   Topic Date Due    Shingles Vaccine (1 of 2) 04/11/1996    Annual Well Visit  04/14/2019       [facility]  Dr. Dakota Devlin, Dr. Gloria Dewey  [facility], [address] [facility]   [phone]

## 2020-03-02 NOTE — PROGRESS NOTES
Lilian Hart is a 69 y.o. female (: 1945) presenting to address:    Chief Complaint   Patient presents with    Labs     follow up       Vitals:    20 1116   BP: 130/78   Pulse: 84   Resp: 16   Temp: 97.8 °F (36.6 °C)   TempSrc: Oral   SpO2: 100%   Weight: 142 lb (64.4 kg)   Height: 5' 3\" (1.6 m)   PainSc:   0 - No pain       Hearing/Vision:   No exam data present    Learning Assessment:     Learning Assessment 10/17/2016   PRIMARY LEARNER Patient   HIGHEST LEVEL OF EDUCATION - PRIMARY LEARNER  GRADUATED HIGH SCHOOL OR GED   BARRIERS PRIMARY LEARNER NONE     NONE   CO-LEARNER CAREGIVER -   PRIMARY LANGUAGE ENGLISH    NEED -   LEARNER PREFERENCE PRIMARY DEMONSTRATION   LEARNING SPECIAL TOPICS -   ANSWERED BY patient   RELATIONSHIP SELF   ASSESSMENT COMMENT -     Depression Screening:     3 most recent PHQ Screens 3/2/2020   Little interest or pleasure in doing things Not at all   Feeling down, depressed, irritable, or hopeless -   Total Score PHQ 2 -     Fall Risk Assessment:     Fall Risk Assessment, last 12 mths 3/2/2020   Able to walk? Yes   Fall in past 12 months? No     Abuse Screening:     Abuse Screening Questionnaire 2020   Do you ever feel afraid of your partner? N   Are you in a relationship with someone who physically or mentally threatens you? N   Is it safe for you to go home? Y     Coordination of Care Questionaire:   1. Have you been to the ER, urgent care clinic since your last visit? Hospitalized since your last visit? NO    2. Have you seen or consulted any other health care providers outside of the [address] [place] [address] since your last visit? Include any pap smears or colon screening. Eye Surgeon    Advanced Directive:   1. Do you have an Advanced Directive? YES    2. Would you like information on Advanced Directives?  NO

## 2020-04-28 RX ORDER — MONTELUKAST SODIUM 10 MG/1
TABLET ORAL
Qty: 90 TAB | Refills: 0 | Status: SHIPPED | OUTPATIENT
Start: 2020-04-28 | End: 2020-07-23

## 2020-07-07 RX ORDER — VALACYCLOVIR HYDROCHLORIDE 1 G/1
TABLET, FILM COATED ORAL
Qty: 15 TAB | Refills: 0 | Status: SHIPPED | OUTPATIENT
Start: 2020-07-07 | End: 2020-12-08

## 2020-10-27 ENCOUNTER — CLINICAL SUPPORT (OUTPATIENT)
Dept: FAMILY MEDICINE CLINIC | Age: 75
End: 2020-10-27
Payer: MEDICARE

## 2020-10-27 VITALS — TEMPERATURE: 97.8 F

## 2020-10-27 DIAGNOSIS — Z23 NEEDS FLU SHOT: Primary | ICD-10-CM

## 2020-10-27 PROCEDURE — 90694 VACC AIIV4 NO PRSRV 0.5ML IM: CPT | Performed by: NURSE PRACTITIONER

## 2020-12-08 RX ORDER — VALACYCLOVIR HYDROCHLORIDE 1 G/1
TABLET, FILM COATED ORAL
Qty: 15 TAB | Refills: 0 | Status: SHIPPED | OUTPATIENT
Start: 2020-12-08 | End: 2021-08-02 | Stop reason: SDUPTHER

## 2020-12-10 RX ORDER — SIMVASTATIN 10 MG/1
TABLET, FILM COATED ORAL
Qty: 90 TAB | Refills: 0 | Status: SHIPPED | OUTPATIENT
Start: 2020-12-10 | End: 2021-03-17 | Stop reason: SDUPTHER

## 2021-01-20 NOTE — TELEPHONE ENCOUNTER
Requested Prescriptions     Pending Prescriptions Disp Refills    montelukast (SINGULAIR) 10 mg tablet 90 Tab 1     Pt is scheduled for an upcoming DENIS appt w/ Anabella Mooney. Please advise.      Future Appointments   Date Time Provider Lennie Rodriges   3/1/2021  1:00 PM GANESH GanMA BS AMB

## 2021-01-26 NOTE — TELEPHONE ENCOUNTER
Patient called stating that she has been waiting on this prescription since last week, patient states that she has really bad allergies and doesn't want to go too long without this medication  please advise.

## 2021-01-27 RX ORDER — MONTELUKAST SODIUM 10 MG/1
TABLET ORAL
Qty: 90 TAB | Refills: 1 | Status: SHIPPED | OUTPATIENT
Start: 2021-01-27 | End: 2021-01-28 | Stop reason: SDUPTHER

## 2021-01-28 RX ORDER — MONTELUKAST SODIUM 10 MG/1
TABLET ORAL
Qty: 90 TAB | Refills: 0 | Status: SHIPPED | OUTPATIENT
Start: 2021-01-28 | End: 2021-07-13

## 2021-03-12 ENCOUNTER — OFFICE VISIT (OUTPATIENT)
Dept: FAMILY MEDICINE CLINIC | Age: 76
End: 2021-03-12
Payer: MEDICARE

## 2021-03-12 VITALS
BODY MASS INDEX: 24.98 KG/M2 | OXYGEN SATURATION: 98 % | SYSTOLIC BLOOD PRESSURE: 153 MMHG | HEART RATE: 88 BPM | HEIGHT: 63 IN | RESPIRATION RATE: 16 BRPM | TEMPERATURE: 98.1 F | WEIGHT: 141 LBS | DIASTOLIC BLOOD PRESSURE: 81 MMHG

## 2021-03-12 DIAGNOSIS — M85.80 OSTEOPENIA, UNSPECIFIED LOCATION: ICD-10-CM

## 2021-03-12 DIAGNOSIS — E55.9 VITAMIN D DEFICIENCY: ICD-10-CM

## 2021-03-12 DIAGNOSIS — Z00.00 MEDICARE ANNUAL WELLNESS VISIT, SUBSEQUENT: ICD-10-CM

## 2021-03-12 DIAGNOSIS — I10 ESSENTIAL HYPERTENSION: Primary | ICD-10-CM

## 2021-03-12 DIAGNOSIS — E78.2 MIXED HYPERLIPIDEMIA: ICD-10-CM

## 2021-03-12 PROCEDURE — G8753 SYS BP > OR = 140: HCPCS | Performed by: NURSE PRACTITIONER

## 2021-03-12 PROCEDURE — G8427 DOCREV CUR MEDS BY ELIG CLIN: HCPCS | Performed by: NURSE PRACTITIONER

## 2021-03-12 PROCEDURE — G9899 SCRN MAM PERF RSLTS DOC: HCPCS | Performed by: NURSE PRACTITIONER

## 2021-03-12 PROCEDURE — G8432 DEP SCR NOT DOC, RNG: HCPCS | Performed by: NURSE PRACTITIONER

## 2021-03-12 PROCEDURE — 1101F PT FALLS ASSESS-DOCD LE1/YR: CPT | Performed by: NURSE PRACTITIONER

## 2021-03-12 PROCEDURE — G8536 NO DOC ELDER MAL SCRN: HCPCS | Performed by: NURSE PRACTITIONER

## 2021-03-12 PROCEDURE — G0439 PPPS, SUBSEQ VISIT: HCPCS | Performed by: NURSE PRACTITIONER

## 2021-03-12 PROCEDURE — G8754 DIAS BP LESS 90: HCPCS | Performed by: NURSE PRACTITIONER

## 2021-03-12 PROCEDURE — 3017F COLORECTAL CA SCREEN DOC REV: CPT | Performed by: NURSE PRACTITIONER

## 2021-03-12 PROCEDURE — G8420 CALC BMI NORM PARAMETERS: HCPCS | Performed by: NURSE PRACTITIONER

## 2021-03-12 PROCEDURE — G8399 PT W/DXA RESULTS DOCUMENT: HCPCS | Performed by: NURSE PRACTITIONER

## 2021-03-12 RX ORDER — AMLODIPINE BESYLATE 10 MG/1
10 TABLET ORAL DAILY
Qty: 30 TAB | Refills: 3 | Status: SHIPPED | OUTPATIENT
Start: 2021-03-12

## 2021-03-12 NOTE — PROGRESS NOTES
Carmelo Medeiros is a 77 y.o. female (: 1945) presenting to address:    No chief complaint on file. Vitals:    21 1310   BP: (!) 153/81   Pulse: 88   Resp: 16   Temp: 98.1 °F (36.7 °C)   TempSrc: Temporal   SpO2: 98%   Weight: 141 lb (64 kg)   Height: 5' 3\" (1.6 m)   PainSc:   0 - No pain       Hearing/Vision:   No exam data present    Learning Assessment:     Learning Assessment 10/17/2016   PRIMARY LEARNER Patient   HIGHEST LEVEL OF EDUCATION - PRIMARY LEARNER  GRADUATED HIGH SCHOOL OR GED   BARRIERS PRIMARY LEARNER NONE     NONE   CO-LEARNER CAREGIVER -   PRIMARY LANGUAGE ENGLISH    NEED -   LEARNER PREFERENCE PRIMARY DEMONSTRATION   LEARNING SPECIAL TOPICS -   ANSWERED BY patient   RELATIONSHIP SELF   ASSESSMENT COMMENT -     Depression Screening:     3 most recent PHQ Screens 3/12/2021   Little interest or pleasure in doing things Not at all   Feeling down, depressed, irritable, or hopeless Not at all   Total Score PHQ 2 0     Fall Risk Assessment:     Fall Risk Assessment, last 12 mths 3/12/2021   Able to walk? Yes   Fall in past 12 months? 0   Do you feel unsteady? 0   Are you worried about falling 0     Abuse Screening:     Abuse Screening Questionnaire 2020   Do you ever feel afraid of your partner? N   Are you in a relationship with someone who physically or mentally threatens you? N   Is it safe for you to go home? Y     Coordination of Care Questionaire:   1. Have you been to the ER, urgent care clinic since your last visit? Hospitalized since your last visit? NO    2. Have you seen or consulted any other health care providers outside of the [address] [place] [address] since your last visit? Include any pap smears or colon screening. NO    Advanced Directive:   1. Do you have an Advanced Directive? Yes     2. Would you like information on Advanced Directives?  NO

## 2021-03-12 NOTE — PROGRESS NOTES
Date of Service:  3/12/2021   Patient Name:  Adali Snider   Patient :  1945     This is a Subsequent Medicare Annual Wellness Visit providing Personalized Prevention Plan Services (PPPS) (Performed 12 months after initial AWV and PPPS )     I have reviewed the patient's medical history in detail and updated the computerized patient record.      History     Past Medical History:   Diagnosis Date    Anemia, iron deficiency secondary to bowel resection     Followed by Dr Brandon Rosario, gets iron transfusions PRN, not expected to completely normalize    Cervical spondylosis without myelopathy 2014    Sees Dr. Ashford [place]    Chronic pain syndrome 2014    Spinal DDD and scoliosis, Sees Dr. Gilbert Gross Collagenous colitis     Dr. Robert Tomlinson, using enterocort sparingly    Constipation     Dr. Marrero Sjogrrony DDD (degenerative disc disease), cervical     Sees Dr. Renu Atwood hypertension     Well-controlled on meds    Gastroparesis     Resolved, was secondary to morphine    Gastroparesis     Resolved, was secondary to morphine     Genital herpes     Pt taking meds    GERD (gastroesophageal reflux disease)     Dr. Robert Tomlinson    H/O resection of small bowel     Secondary to ischemic gut from adhesions    Hx of breast cancer     Had lumpectomy, sees Dr. Lizzette Colón Hyperlipidemia     Well controlled on low dose statin    Osteoarthritis     Osteopenia     Dr. Joseph Ndiaye    Pain in lower jaw 9/3/2014    -- Dr. Obdulio Ibrahim (dentist)    Mike Quevedo Peptic ulcer     taking meds     Reactive airway disease     Wheezing after exposure to spray , has albuterol PRN        Past Surgical History:   Procedure Laterality Date    HX BREAST LUMPECTOMY Right 2007    cancerous    HX COLONOSCOPY  2008    Collagenous colitis on biopsy - Dr. Dana Carrillo HX COLONOSCOPY  2011    Diverticulosis, internal hemorrhoids - Dr. Dana Carrillo HX PARTIAL HYSTERECTOMY      HX SMALL BOWEL RESECTION  07/01/2011    Middle 1/3 of small bowel; caused by scar tissue from hysterectomy, gangrenous small bowel       Current Outpatient Medications   Medication Sig Dispense Refill    montelukast (SINGULAIR) 10 mg tablet Take 1 tablet by mouth once daily 90 Tab 0    simvastatin (ZOCOR) 10 mg tablet Take 1 tablet by mouth nightly 90 Tab 0    valACYclovir (VALTREX) 1 gram tablet Take 1 tablet by mouth once daily for 5 days 15 Tab 0    amLODIPine (NORVASC) 5 mg tablet TAKE 1 & 1/2 (ONE & ONE-HALF) TABLETS BY MOUTH ONCE DAILY 135 Tab 1    cyclobenzaprine (FLEXERIL) 5 mg tablet Take 5 mg by mouth.  terbinafine HCl (LAMISIL) 1 % topical cream Apply pea-sized amount bid x 10-14d to axilla 30 g 0    ubidecarenone (COQ-10 PO) Take 200 mg by mouth daily.  GELATIN PO Take  by mouth. Indications: Takes twice a day      b complex vitamins (B COMPLEX 1) tablet Take 1 Tab by mouth daily.  glucosamine HCl/chondroitin swan (GLUCOSAMINE-CHONDROITIN PO) Take 1,500 mg by mouth two (2) times a day. Indications: Glucosamine 1500mg / Chondroiton 1200mg      magnesium hydroxide (MILK OF MAGNESIA) 400 mg/5 mL suspension Take 30 mL by mouth.  acetaminophen (TYLENOL) 325 mg tablet Take 650 mg by mouth every four (4) hours as needed.  meloxicam (MOBIC) 15 mg tablet Take 15 mg by mouth two (2) times a day.  DULoxetine (CYMBALTA) 60 mg capsule Take 1 capsule by mouth daily. 90 capsule 1    multivitamin (ONE A DAY) tablet Take 1 Tab by mouth daily.  BIOTIN PO Take 1,000 mg by mouth daily.  cyanocobalamin (VITAMIN B12) 500 mcg tablet Take 500 mcg by mouth daily.  ascorbic acid (VITAMIN C) 1,000 mg tablet Take 500 mg by mouth three (3) times daily.  Omega-3 Fatty Acids (FISH OIL) 500 mg cap Take  by mouth.          Allergies   Allergen Reactions    Ciprofloxacin Unknown (comments)    Morphine Other (comments)     Gastroparesis    Neosporin [Benzalkonium Chloride] Rash     Pt reports no allergy    Lisinopril Cough    Penicillin G Rash    Sulfa (Sulfonamide Antibiotics) Rash       Family History   Problem Relation Age of Onset    Cancer Mother        Social History     Tobacco Use    Smoking status: Never Smoker    Smokeless tobacco: Never Used   Substance Use Topics    Alcohol use: No       Patient Active Problem List   Diagnosis Code    Chronic pain syndrome G89.4    Cervical spondylosis without myelopathy M47.812    Degeneration of cervical intervertebral disc M50.30    Essential hypertension I10    Genital herpes A60.00    Hyperlipidemia E78.5    Osteoarthritis M19.90    Hx of breast cancer Z85.3    Anemia, iron deficiency secondary to bowel resection D50.9    H/O resection of small bowel Z90.49    Reactive airway disease J45.909    Allergic rhinitis due to allergen J30.9    Osteopenia M85.80    Intermittent tremor R25.1    Hx of peptic ulcer Z87.11    Collagenous colitis K52.831    Stage 3 chronic kidney disease N18.30       Living situation:   -- Lives with   -- Luis Clinton in home:    Diet, Lifestyle: generally follows a low fat low cholesterol diet, generally follows a low sodium diet, exercises regularly, nonsmoker    Exercise level: moderately active    Depression Risk Factor Screening:     3 most recent PHQ Screens 3/12/2021   Little interest or pleasure in doing things Not at all   Feeling down, depressed, irritable, or hopeless Not at all   Total Score PHQ 2 0     Alcohol Risk Factor Screening:   Do you average more than 1 drink per night or more than 7 drinks a week:  No    On any one occasion in the past three months have you have had more than 3 drinks containing alcohol:  No    Functional Ability and Level of Safety:     Hearing Loss   Hearing is good. Activities of Daily Living   Self-care. Requires assistance with: no ADLs    Fall Risk     Fall Risk Assessment, last 12 mths 3/12/2021   Able to walk?  Yes Fall in past 12 months? 0   Do you feel unsteady? 0   Are you worried about falling 0     Abuse Screen   Patient is not abused    Review of Systems   Review of systems reported in the separate problem-oriented documentation. Physical Examination   Physical exam reported in the separate problem-oriented documentation. VS:   Visit Vitals  BP (!) 153/81   Pulse 88   Temp 98.1 °F (36.7 °C) (Temporal)   Resp 16   Ht 5' 3\" (1.6 m)   Wt 141 lb (64 kg)   LMP  (LMP Unknown)   SpO2 98%   BMI 24.98 kg/m²       No exam data present     Evaluation of Cognitive Function:  Mood/affect:  happy  Appearance: age appropriate and well dressed  Family member/caregiver input: NA    Patient Care Team:  Marzena Avina NP as PCP - General (Nurse Practitioner)  Marzena Avina NP as PCP - [facility] Empaneled Provider  Jacoby Heard MD as Consulting Provider (Gastroenterology)  Maria Eugenia Sharp MD as Consulting Provider (Rheumatology)  Tai Biggs MD as Consulting Provider (Orthopedic Surgery)  Gerri Wang MD as Consulting Provider (Oncology)  Vicki Talamantes MD as Consulting Provider (Dermatology)  Delon Queen MD as Consulting Provider (Pain Management)  Bina Moffett MD as Consulting Provider (Optometry)  Rimma Weir MD as Consulting Provider (Neurology)    Advice/Counseling   Education and counseling provided:  Are appropriate based on today's review and evaluation  Screening Mammography  Screening Pap and pelvic (covered once every 2 years)  Colorectal cancer screening tests  Cardiovascular screening blood test  Bone mass measurement (DEXA)  Screening for glaucoma      Assessment/Plan   current treatment plan is effective, no change in therapy  lab results and schedule of future lab studies reviewed with patient  reviewed diet, exercise and weight control  cardiovascular risk and specific lipid/LDL goals reviewed.     Lorenzo Golden was provided a written 5-year personalized preventive services plan, which was included in her AVS.    Mario Shields NP

## 2021-03-16 ENCOUNTER — APPOINTMENT (OUTPATIENT)
Dept: FAMILY MEDICINE CLINIC | Age: 76
End: 2021-03-16

## 2021-03-16 ENCOUNTER — HOSPITAL ENCOUNTER (OUTPATIENT)
Dept: LAB | Age: 76
Discharge: HOME | End: 2021-03-16
Payer: MEDICARE

## 2021-03-16 DIAGNOSIS — E55.9 VITAMIN D DEFICIENCY: ICD-10-CM

## 2021-03-16 DIAGNOSIS — E78.2 MIXED HYPERLIPIDEMIA: ICD-10-CM

## 2021-03-16 DIAGNOSIS — I10 ESSENTIAL HYPERTENSION: ICD-10-CM

## 2021-03-16 LAB
25(OH)D3 SERPL-MCNC: 28.8 NG/ML (ref 30–100)
ALBUMIN SERPL-MCNC: 4.4 G/DL (ref 3.4–5)
ALBUMIN/GLOB SERPL: 1.5 {RATIO} (ref 0.8–1.7)
ALP SERPL-CCNC: 71 U/L (ref 45–117)
ALT SERPL-CCNC: 31 U/L (ref 13–56)
ANION GAP SERPL CALC-SCNC: 7 MMOL/L (ref 3–18)
APPEARANCE UR: ABNORMAL
AST SERPL-CCNC: 29 U/L (ref 10–38)
BACTERIA URNS QL MICRO: ABNORMAL /HPF
BASOPHILS # BLD: 0 K/UL (ref 0–0.1)
BASOPHILS NFR BLD: 1 % (ref 0–2)
BILIRUB SERPL-MCNC: 0.6 MG/DL (ref 0.2–1)
BILIRUB UR QL: NEGATIVE
BUN SERPL-MCNC: 13 MG/DL (ref 7–18)
BUN/CREAT SERPL: 15 (ref 12–20)
CALCIUM SERPL-MCNC: 9.2 MG/DL (ref 8.5–10.1)
CHLORIDE SERPL-SCNC: 108 MMOL/L (ref 100–111)
CHOLEST SERPL-MCNC: 198 MG/DL
CO2 SERPL-SCNC: 27 MMOL/L (ref 21–32)
COLOR UR: ABNORMAL
CREAT SERPL-MCNC: 0.86 MG/DL (ref 0.6–1.3)
DIFFERENTIAL METHOD BLD: NORMAL
EOSINOPHIL # BLD: 0.1 K/UL (ref 0–0.4)
EOSINOPHIL NFR BLD: 3 % (ref 0–5)
EPITH CASTS URNS QL MICRO: ABNORMAL /LPF (ref 0–5)
ERYTHROCYTE [DISTWIDTH] IN BLOOD BY AUTOMATED COUNT: 14.1 % (ref 11.6–14.5)
GLOBULIN SER CALC-MCNC: 3 G/DL (ref 2–4)
GLUCOSE SERPL-MCNC: 93 MG/DL (ref 74–99)
GLUCOSE UR STRIP.AUTO-MCNC: NEGATIVE MG/DL
HCT VFR BLD AUTO: 40.9 % (ref 35–45)
HDLC SERPL-MCNC: 103 MG/DL (ref 40–60)
HDLC SERPL: 1.9 {RATIO} (ref 0–5)
HGB BLD-MCNC: 13 G/DL (ref 12–16)
HGB UR QL STRIP: NEGATIVE
KETONES UR QL STRIP.AUTO: ABNORMAL MG/DL
LDLC SERPL CALC-MCNC: 75.6 MG/DL (ref 0–100)
LEUKOCYTE ESTERASE UR QL STRIP.AUTO: ABNORMAL
LIPID PROFILE,FLP: ABNORMAL
LYMPHOCYTES # BLD: 1.2 K/UL (ref 0.9–3.6)
LYMPHOCYTES NFR BLD: 26 % (ref 21–52)
MCH RBC QN AUTO: 29.6 PG (ref 24–34)
MCHC RBC AUTO-ENTMCNC: 31.8 G/DL (ref 31–37)
MCV RBC AUTO: 93.2 FL (ref 74–97)
MONOCYTES # BLD: 0.4 K/UL (ref 0.05–1.2)
MONOCYTES NFR BLD: 8 % (ref 3–10)
MUCOUS THREADS URNS QL MICRO: ABNORMAL /LPF
NEUTS SEG # BLD: 3 K/UL (ref 1.8–8)
NEUTS SEG NFR BLD: 62 % (ref 40–73)
NITRITE UR QL STRIP.AUTO: NEGATIVE
PH UR STRIP: 7.5 [PH] (ref 5–8)
PLATELET # BLD AUTO: 400 K/UL (ref 135–420)
PMV BLD AUTO: 10.9 FL (ref 9.2–11.8)
POTASSIUM SERPL-SCNC: 4.3 MMOL/L (ref 3.5–5.5)
PROT SERPL-MCNC: 7.4 G/DL (ref 6.4–8.2)
PROT UR STRIP-MCNC: ABNORMAL MG/DL
RBC # BLD AUTO: 4.39 M/UL (ref 4.2–5.3)
SODIUM SERPL-SCNC: 142 MMOL/L (ref 136–145)
SP GR UR REFRACTOMETRY: 1.02 (ref 1–1.03)
TRIGL SERPL-MCNC: 97 MG/DL (ref ?–150)
TSH SERPL DL<=0.05 MIU/L-ACNC: 2.25 UIU/ML (ref 0.36–3.74)
UROBILINOGEN UR QL STRIP.AUTO: 0.2 EU/DL (ref 0.2–1)
VLDLC SERPL CALC-MCNC: 19.4 MG/DL
WBC # BLD AUTO: 4.8 K/UL (ref 4.6–13.2)
WBC URNS QL MICRO: ABNORMAL /HPF (ref 0–5)

## 2021-03-16 PROCEDURE — 80061 LIPID PANEL: CPT

## 2021-03-16 PROCEDURE — 36415 COLL VENOUS BLD VENIPUNCTURE: CPT

## 2021-03-16 PROCEDURE — 81001 URINALYSIS AUTO W/SCOPE: CPT

## 2021-03-16 PROCEDURE — 84443 ASSAY THYROID STIM HORMONE: CPT

## 2021-03-16 PROCEDURE — 80053 COMPREHEN METABOLIC PANEL: CPT

## 2021-03-16 PROCEDURE — 82306 VITAMIN D 25 HYDROXY: CPT

## 2021-03-16 PROCEDURE — 85025 COMPLETE CBC W/AUTO DIFF WBC: CPT

## 2021-03-17 RX ORDER — SIMVASTATIN 10 MG/1
TABLET, FILM COATED ORAL
Qty: 90 TAB | Refills: 1 | Status: SHIPPED | OUTPATIENT
Start: 2021-03-17

## 2021-03-17 NOTE — PROGRESS NOTES
Patient notified of results she voiced understanding . She is already taken 2,000 units of vitamin d also has 1,000 in multi vitamin and another 600 units in her calcium . patient is wondering if it is a absorption issues since she had a middle portion of her small lower intestine removed .

## 2021-03-17 NOTE — PROGRESS NOTES
Unlikely an absorption issue since vitamin D is absorbed in the duodenum which is the upper most part of the small intestine that connects to the stomach.

## 2021-04-16 ENCOUNTER — OFFICE VISIT (OUTPATIENT)
Dept: FAMILY MEDICINE CLINIC | Age: 76
End: 2021-04-16
Payer: MEDICARE

## 2021-04-16 VITALS
BODY MASS INDEX: 26.05 KG/M2 | WEIGHT: 147 LBS | OXYGEN SATURATION: 98 % | HEIGHT: 63 IN | TEMPERATURE: 98.4 F | SYSTOLIC BLOOD PRESSURE: 146 MMHG | HEART RATE: 94 BPM | DIASTOLIC BLOOD PRESSURE: 72 MMHG | RESPIRATION RATE: 16 BRPM

## 2021-04-16 DIAGNOSIS — I10 ESSENTIAL HYPERTENSION: ICD-10-CM

## 2021-04-16 DIAGNOSIS — M54.50 CHRONIC BILATERAL LOW BACK PAIN, UNSPECIFIED WHETHER SCIATICA PRESENT: ICD-10-CM

## 2021-04-16 DIAGNOSIS — M25.551 RIGHT HIP PAIN: Primary | ICD-10-CM

## 2021-04-16 DIAGNOSIS — G89.29 CHRONIC BILATERAL LOW BACK PAIN, UNSPECIFIED WHETHER SCIATICA PRESENT: ICD-10-CM

## 2021-04-16 PROCEDURE — G8432 DEP SCR NOT DOC, RNG: HCPCS | Performed by: NURSE PRACTITIONER

## 2021-04-16 PROCEDURE — 1101F PT FALLS ASSESS-DOCD LE1/YR: CPT | Performed by: NURSE PRACTITIONER

## 2021-04-16 PROCEDURE — G8419 CALC BMI OUT NRM PARAM NOF/U: HCPCS | Performed by: NURSE PRACTITIONER

## 2021-04-16 PROCEDURE — 3017F COLORECTAL CA SCREEN DOC REV: CPT | Performed by: NURSE PRACTITIONER

## 2021-04-16 PROCEDURE — G8399 PT W/DXA RESULTS DOCUMENT: HCPCS | Performed by: NURSE PRACTITIONER

## 2021-04-16 PROCEDURE — G8754 DIAS BP LESS 90: HCPCS | Performed by: NURSE PRACTITIONER

## 2021-04-16 PROCEDURE — G8427 DOCREV CUR MEDS BY ELIG CLIN: HCPCS | Performed by: NURSE PRACTITIONER

## 2021-04-16 PROCEDURE — 99213 OFFICE O/P EST LOW 20 MIN: CPT | Performed by: NURSE PRACTITIONER

## 2021-04-16 PROCEDURE — G8536 NO DOC ELDER MAL SCRN: HCPCS | Performed by: NURSE PRACTITIONER

## 2021-04-16 PROCEDURE — G8753 SYS BP > OR = 140: HCPCS | Performed by: NURSE PRACTITIONER

## 2021-04-16 PROCEDURE — 1090F PRES/ABSN URINE INCON ASSESS: CPT | Performed by: NURSE PRACTITIONER

## 2021-04-16 NOTE — PROGRESS NOTES
HPI  Charlene Joseph is a 77y.o. year old female who presents today for follow up for blood pressure. Since last OV she increased her amlodipine to 10mg daily. She has been taking her BP at home and her average is 143/61. Thinks that part of the reason her BP is high is because she has been in a lot of pain because of her back and hip. Has chronic low back and right hip pain. Sees rheumatology but doesn't have an appointment until June. Says they told her that she cannot get any more injections in her hip, and if it continues to bother her she will need physical therapy.        Past Medical History:   Diagnosis Date    Anemia, iron deficiency secondary to bowel resection     Followed by Dr Rylie Purdy, gets iron transfusions PRN, not expected to completely normalize    Cervical spondylosis without myelopathy 1/7/2014    Sees Dr. Ros De Leon    Chronic pain syndrome 1/7/2014    Spinal DDD and scoliosis, Sees Dr. Myles Barrera Collagenous colitis     Dr. Gerald Esteban, using enterocort sparingly    Constipation     Dr. Sewell Muna DDD (degenerative disc disease), cervical     Sees Dr. Tamara Blount hypertension     Well-controlled on meds    Gastroparesis     Resolved, was secondary to morphine    Gastroparesis     Resolved, was secondary to morphine     Genital herpes 1988    Pt taking meds    GERD (gastroesophageal reflux disease)     Dr. Gerald Esteban    H/O resection of small bowel 2011    Secondary to ischemic gut from adhesions    Hx of breast cancer     Had lumpectomy, sees Dr. Geovanni Bruce Hyperlipidemia     Well controlled on low dose statin    Osteoarthritis     Osteopenia     Dr. Joan Hitchcock    Pain in lower jaw 9/3/2014    -- Dr. Millicent Landau (dentist)    [facility] Peptic ulcer     taking meds     Reactive airway disease     Wheezing after exposure to spray , has albuterol PRN       Past Surgical History:   Procedure Laterality Date    HX BREAST LUMPECTOMY Right september 2007    cancerous    HX COLONOSCOPY  2/21/2008    Collagenous colitis on biopsy - Dr. Niurka Vargas HX COLONOSCOPY  2/14/2011    Diverticulosis, internal hemorrhoids - Dr. Niurka Vargas [address] HX SMALL BOWEL RESECTION  07/01/2011    Middle 1/3 of small bowel; caused by scar tissue from hysterectomy, gangrenous small bowel       Social History     Tobacco Use    Smoking status: Never Smoker    Smokeless tobacco: Never Used   Substance Use Topics    Alcohol use: No    Drug use: No         Current Outpatient Medications:     simvastatin (ZOCOR) 10 mg tablet, Take 1 tablet by mouth nightly, Disp: 90 Tab, Rfl: 1    amLODIPine (NORVASC) 10 mg tablet, Take 1 Tab by mouth daily. , Disp: 30 Tab, Rfl: 3    montelukast (SINGULAIR) 10 mg tablet, Take 1 tablet by mouth once daily, Disp: 90 Tab, Rfl: 0    valACYclovir (VALTREX) 1 gram tablet, Take 1 tablet by mouth once daily for 5 days, Disp: 15 Tab, Rfl: 0    cyclobenzaprine (FLEXERIL) 5 mg tablet, Take 5 mg by mouth., Disp: , Rfl:     terbinafine HCl (LAMISIL) 1 % topical cream, Apply pea-sized amount bid x 10-14d to axilla, Disp: 30 g, Rfl: 0    ubidecarenone (COQ-10 PO), Take 200 mg by mouth daily. , Disp: , Rfl:     GELATIN PO, Take  by mouth. Indications: Takes twice a day, Disp: , Rfl:     b complex vitamins (B COMPLEX 1) tablet, Take 1 Tab by mouth daily. , Disp: , Rfl:     glucosamine HCl/chondroitin swan (GLUCOSAMINE-CHONDROITIN PO), Take 1,500 mg by mouth two (2) times a day. Indications: Glucosamine 1500mg / Chondroiton 1200mg, Disp: , Rfl:     Omega-3 Fatty Acids (FISH OIL) 500 mg cap, Take  by mouth., Disp: , Rfl:     magnesium hydroxide (MILK OF MAGNESIA) 400 mg/5 mL suspension, Take 30 mL by mouth., Disp: , Rfl:     acetaminophen (TYLENOL) 325 mg tablet, Take 650 mg by mouth every four (4) hours as needed. , Disp: , Rfl:     meloxicam (MOBIC) 15 mg tablet, Take 15 mg by mouth two (2) times a day., Disp: , Rfl:    DULoxetine (CYMBALTA) 60 mg capsule, Take 1 capsule by mouth daily. , Disp: 90 capsule, Rfl: 1    multivitamin (ONE A DAY) tablet, Take 1 Tab by mouth daily. , Disp: , Rfl:     BIOTIN PO, Take 1,000 mg by mouth daily. , Disp: , Rfl:     cyanocobalamin (VITAMIN B12) 500 mcg tablet, Take 500 mcg by mouth daily. , Disp: , Rfl:     ascorbic acid (VITAMIN C) 1,000 mg tablet, Take 500 mg by mouth three (3) times daily. , Disp: , Rfl:      Allergies   Allergen Reactions    Ciprofloxacin Unknown (comments)    Morphine Other (comments)     Gastroparesis    Neosporin [Benzalkonium Chloride] Rash     Pt reports no allergy    Lisinopril Cough    Penicillin G Rash    Sulfa (Sulfonamide Antibiotics) Rash     Review of Systems   Constitutional: Negative for chills, fever and malaise/fatigue. Eyes: Negative for blurred vision and double vision. Respiratory: Negative for cough and shortness of breath. Cardiovascular: Negative for chest pain, palpitations and leg swelling. Musculoskeletal: Positive for back pain and joint pain (right hip). PE  BP (!) 166/79   Pulse 94   Temp 98.4 °F (36.9 °C) (Temporal)   Resp 16   Ht 5' 3\" (1.6 m)   Wt 147 lb (66.7 kg)   LMP  (LMP Unknown)   SpO2 98%   BMI 26.04 kg/m²     Physical Exam  Vitals signs reviewed. Constitutional:       General: She is not in acute distress. Appearance: Normal appearance. HENT:      Head: Normocephalic and atraumatic. Cardiovascular:      Rate and Rhythm: Normal rate. Heart sounds: S1 normal and S2 normal. No murmur. No friction rub. No gallop. No S4 sounds. Pulmonary:      Effort: Pulmonary effort is normal.      Breath sounds: Normal breath sounds. No wheezing, rhonchi or rales. Musculoskeletal:      Right lower leg: No edema. Left lower leg: No edema. Skin:     General: Skin is warm and dry. Capillary Refill: Capillary refill takes less than 2 seconds. Neurological:      Mental Status: She is alert. Assessment/Plan  1. HTN  Home blood pressure readings are good, continue amlodipine 10mg    2.  Hip and back pain  Refer to physical therapy

## 2021-04-16 NOTE — PROGRESS NOTES
Karen Jacob is a 77 y.o. female (: 1945) presenting to address:    Chief Complaint   Patient presents with    Blood Pressure Check     143/61 averaging at home     Hip Pain     had an injection in january . about a month ago started having issues in  right hip again .  Back Pain       Vitals:    21 1310   BP: (!) 166/79   Pulse: 94   Resp: 16   Temp: 98.4 °F (36.9 °C)   TempSrc: Temporal   SpO2: 98%   Weight: 147 lb (66.7 kg)   Height: 5' 3\" (1.6 m)   PainSc:   8   PainLoc: Hip       Hearing/Vision:   No exam data present    Learning Assessment:     Learning Assessment 10/17/2016   PRIMARY LEARNER Patient   HIGHEST LEVEL OF EDUCATION - PRIMARY LEARNER  GRADUATED HIGH SCHOOL OR GED   BARRIERS PRIMARY LEARNER NONE     NONE   CO-LEARNER CAREGIVER -   PRIMARY LANGUAGE ENGLISH    NEED -   LEARNER PREFERENCE PRIMARY DEMONSTRATION   LEARNING SPECIAL TOPICS -   ANSWERED BY patient   RELATIONSHIP SELF   ASSESSMENT COMMENT -     Depression Screening:     3 most recent PHQ Screens 3/12/2021   Little interest or pleasure in doing things Not at all   Feeling down, depressed, irritable, or hopeless Not at all   Total Score PHQ 2 0     Fall Risk Assessment:     Fall Risk Assessment, last 12 mths 3/12/2021   Able to walk? Yes   Fall in past 12 months? 0   Do you feel unsteady? 0   Are you worried about falling 0     Abuse Screening:     Abuse Screening Questionnaire 2020   Do you ever feel afraid of your partner? N   Are you in a relationship with someone who physically or mentally threatens you? N   Is it safe for you to go home? Y     Coordination of Care Questionaire:   1. Have you been to the ER, urgent care clinic since your last visit? Hospitalized since your last visit? NO    2. Have you seen or consulted any other health care providers outside of the [address] [place] [address] since your last visit? Include any pap smears or colon screening. NO    Advanced Directive:   1.  Do you have an Advanced Directive? NO    2. Would you like information on Advanced Directives?  NO

## 2021-04-22 ENCOUNTER — HOSPITAL ENCOUNTER (OUTPATIENT)
Dept: PHYSICAL THERAPY | Age: 76
Discharge: HOME | End: 2021-04-22
Payer: MEDICARE

## 2021-04-22 PROCEDURE — 97162 PT EVAL MOD COMPLEX 30 MIN: CPT

## 2021-04-22 NOTE — PROGRESS NOTES
[address] [place] [address] PHYSICAL THERAPY  [address] [place] [address] Ortiz Sanchez, [address] [employer] [address] - Phone: [phone]  Fax: [phone] / [address]  Patient Name: Ritesh Blair : 1945   Medical   Diagnosis: Low back pain [M54.5]  Pain in right hip [M25.551] Treatment Diagnosis: LBP, Right Hip Bursitis  Right LE Radiculopathy   Onset Date: Chronic     Referral Source: Pan Aguilar NP Start of Care [facility]): 2021   Prior Hospitalization: See medical history Provider #: [id number]   Prior Level of Function: Functionally independent, retired   Comorbidities: Scoliosis   Medications: Verified on Patient Summary List   The Plan of Care and following information is based on the information from the initial evaluation.   ===========================================================================================  Assessment / key information: Patient is a 77 y.o. female presenting with CC LBP and right lateral hip pain x2 months, insidious onset in nature. She now also reports intermittent anterior thigh pain that radiates to her knee. Pain is worsened by driving, walking/standing >10', sitting >10', stairs, and performing household chores. Symptoms also hinder restful sleep. Lumbar AROM WFL, Hip objective measures as follows:  ROM/Strength                   AROM                          PROM                       Strength (1-5)  Hip Left Right Left Right Left Right   Flexion 120 120 130 122 3 3   Extension 10 10 22 20 3 3   Abduction 45 45 51 50 3 3   ER Supine 40 30 45 49 3 3   IR Supine 15 12 20 20 3 3   Pt  will benefit from physical therapist management to address her impairments (listed below),  educate her, and improve her level of function.  Thanks for your referral.   ===========================================================================================  Eval Complexity: History: HIGH Complexity :3+ comorbidities / personal factors will impact the outcome/ POC Exam:HIGH Complexity : 4+ Standardized tests and measures addressing body structure, function, activity limitation and / or participation in recreation  Presentation: MEDIUM Complexity : Evolving with changing characteristics  Clinical Decision Making:MEDIUM Complexity : FOTO score of 26-74Overall Complexity:MEDIUM  Problem List: pain affecting function, decrease ROM, decrease strength, edema affecting function, impaired gait/ balance, decrease ADL/ functional abilitiies, decrease activity tolerance, decrease flexibility/ joint mobility and decrease transfer abilities  FOTO score: 26 indicating 74% functional disability  Treatment Plan may include any combination of the following: Therapeutic exercise, Therapeutic activities, Neuromuscular re-education, Physical agent/modality, Gait/balance training, Manual therapy, Patient education, Self Care training, Functional mobility training, Home safety training and Stair training  Patient / Family readiness to learn indicated by: asking questions, trying to perform skills and interest  Persons(s) to be included in education: patient (P)  Barriers to Learning/Limitations: yes; Patient provides fringe history   Measures taken: Provided frequent redirection to attention to task   Patient Goal (s): \"less pain\"   Patient self reported health status: good  Rehabilitation Potential: good   Short Term Goals: To be accomplished in  2-3  weeks:  1. Patient to be adherent to HEP to facilitate pain control with ADL's.  2. Patient to report minimal sleep disturbance due to LBP. 3. Patient to improve right hip ER PROM to > 65 degrees to promote ease in donning/doffing LE shoes. Long Term Goals: To be accomplished in  4-6  weeks:  1. Patient to be Safe and Independent with HEP to self-manage/prevent symptoms after DC. 2. Patient to increase FS FOTO score to > 53 to indicate increased functional independence.    3. Patient to demonstrate safe stair negotiation in reciprocal pattern to promote community mobility. 4. Patient to report return to recreational walking > 15' uninterrupted . Frequency / Duration:   Patient to be seen  2  times per week for 4-6  weeks:  Patient / Caregiver education and instruction: activity modification and exercises. We reviewed our facility's Patient Personal Responsibilities (PPR) form, particularly in regards to compliance towards her appointment time, our attendance policy, and her home exercise program. Patient was informed of possible discharge for non-compliance to our attendance policy per PPR form. We also discussed her POC as deemed appropriate by the treating therapist and physician. Patient verbalized understanding that she must show objective and functional improvement in an appropriate time frame. Patient verbalized understanding that should progress or compliance be lacking, we will contact the referring physician for further consultation to address and attempt to establish alternate treatment strategies as necessary and/or possibly discharge. Therapist Signature: Hannah Osborn \"BJ\" Mario Alberto Living, DPT, Cert. MDT, Cert. DN, Cert. SMT, Dip. Osteopractic Date: 5/28/0501   Certification Period: 4/22/21-7/21/21 Time: 2:02 PM   ===========================================================================================  I certify that the above Physical Therapy Services are being furnished while the patient is under my care. I agree with the treatment plan and certify that this therapy is necessary. Physician Signature:        Date:       Time:                                         Isaac Foster NP   Please sign and return to [facility] or you may fax the signed copy to [phone]. Thank you.

## 2021-04-22 NOTE — PROGRESS NOTES
PHYSICAL THERAPY - DAILY TREATMENT NOTE  Patient Name: Jose Omer        Date: 2021  : 1945   [x]  Patient  Verified  Visit #:   1     Insurance: Payor: [place] MEDICARE / Plan: [place] MEDICARE PART A & B / Product Type: Medicare /      In time:   1:10          Out time:   1:55 Total Treatment Time (min):   45   Medicare Time Tracking (below)   Total Timed Codes (min):  0 1:1 Treatment Time:  0     TTREATMENT AREA = Low back pain [M54.5]  Pain in right hip [M25.551]    SUBJECTIVE    Pain Level (on 0 to 10 scale):  4.5  / 10   Medication Changes/New allergies or changes in medical history, any new surgeries or procedures? []  No    []  Yes   If yes, update Summary List:    Subjective Functional Status/Changes:  []  No changes reported   HISTORY    Present Symptoms/complaints:LBP, Right hip pain, radiating pain to right anterior thigh    Present since: Chronic  [] Improving []  Unchanging []  Worsening          Commenced as a result of: Reports hx of chronic LBP due to scoliosis. Had not had symptoms for ~5 years until ~1 year ago. Had right hip bursitis, received injection, did not help. Received 2nd injection in January, had some relief. Symptoms returned as she was walking in a park. LBP returned. Reports right hip pain has improved since , LBP continues. or []  No apparent reason  Or  Surgery- DOS=     Constant symptoms:LBP   What produces or worsens:driving, walking. Stairs, playing with grandchildren, dishes, laundry. Sitting >10' Standing >10'     What stops or reduces:pain medication, lying supine    Continued use makes the pain:  [] Better [x]  Worse []  No effect     Disturbed night: [] No    [x] Yes    Pain at rest: [] No    [x] Yes       Treatments this episode: medication. Previous episodes:recurrent    Previous treatment:chiropractic.       Spinal history:C/S and L/S    Paraesthesia: [x] No    [] Yes     General Health:  [x] Good []  Fair []  Poor     Imaging:   [] Yes [x]  No     Summary:    [] Acute []  Sub-acute [x]  Chronic     [] Trauma []  Insidious onset       Work:  Mechanical Stresses:retired    Leisure: Mechanical Stresses:walking, gardening,        OBJECTIVE    Gait:  [x] Normal    [] Abnormal    [] Antalgic    [] NWB    Device:none      Neuro Screening  Myotome Level Muscles Nerve Reflex Sensation Action   L1-L3 Iliopsoas T12/L1-3  Quadriceps L2-4  Adductors L2-L4 (Iliacus)- Femoral  Femoral  Obturator/Sciatic N/A  L3-4 = [place] L1- Inguinal Crease  L2- Anterior Thigh  L3- Anterior Thigh above knee Hip Flexion  Knee Extension   L4 Tibialis anterior L4&5   Deep Peroneal Patella Anterior Knee Suprapatellar Ankle Dorsiflexion   L5 Extensor Hallucis Longus  Extensor Digitorum Lungus  Gluteus Medius Deep Peroneal         Superior Gluteal None Reliable Dorsum of Foot/Great Toe  Anterior Shank Extensor  to Great Toe        Hip Internal Rotation   S1 Peroneus Longus  Gastrocnemius & Soleus  Gluteus Edil Superficial Peroneal  Tibial    Inferior Gluteal Achilles Tendon Lateral Shank around Lateral Malleolus  Lateral Aspect/Dorsum of GT   Plantar Flexion      Hip Extension   Notable findings from above:  WNL         ROM/Strength        AROM                     PROM        Strength (1-5)  Hip Left Right Left Right Left Right   Flexion 120 120 130 122 3 3   Extension 10 10 22 20 3 3   Abduction 45 45 51 50 3 3   ER Supine 40 30 45 49 3 3   IR Supine 15 12 20 20 3 3        Flexibility: [] Unable to assess at this time  Hamstrings:    (L) Tightness= [x] WNL   [] Min   [] Mod   [] Severe    (R) Tightness= [x] WNL   [] Min   [] Mod   [] Severe  Quadriceps:    (L) Tightness= [] WNL   [x] Min   [] Mod   [] Severe    (R) Tightness= [] WNL   [x] Min   [] Mod   [] Severe  Gastroc:    (L) Tightness= [] WNL   [x] Min   [] Mod   [] Severe    (R) Tightness= [] WNL   [x] Min   [] Mod   [] Severe  Optional Tests  Surya/ Kirstin Test: [x] Neg    [] Pos  Arcenio Test:  [x] Neg    [] Pos  Scouring Test:  [x] Neg    [] Pos  Trendelenberg:  [x] Neg    [] Pos [] Left    [] Right  OberTest:   [] Neg    [x] Pos  Ely's Test:  [] Neg    [x] Pos  Piriformis Test:  [x] Neg    [] Pos  Functional Leg Length (cm):   Right:  Left:  Discrepancy:    Other tests/ comments:  L/S - Flexion = 70 degrees, Extension = 20 degrees, Left Side Bend = 50 degrees, Right Side Bend  = 20 degrees, Left Rotation = 45 degrees, right Rotation = 20 degrees          Post Treatment Pain Level (on 0 to 10) scale:   4  / 10     ASSESSMENT    Assessment/Changes in Function:     Justification for Eval Code Complexity:  Patient History (low 0, mod 1-2, high 3-4): high  Examination (low 1-2, mod 3+, high 4+): high  Clinical Presentation (low stable or uncomplicated, mod evolving or changing, high unstable or unpredictable): mod  Clinical Decision Making (low , mod 26-74, high 1-25): FOTO mod      []  See Progress Note/Recertification   Patient will continue to benefit from skilled PT services to modify and progress therapeutic interventions, address functional mobility deficits, address ROM deficits, address strength deficits, analyze and address soft tissue restrictions, analyze and cue movement patterns, analyze and modify body mechanics/ergonomics and instruct in home and community integration  to attain remaining goals   Progress toward goals / Updated goals:    See POC     PLAN    [x]  Upgrade activities as tolerated YES Continue plan of care   []  Discharge due to :    []  Other:      Therapist: Orestes Figueroa \"SHERRY\" Mayda Villarreal DPT, Cert. MDT, Cert. DN, Cert. SMT, Dip. Osteopractic    Date: 4/22/2021 Time: 1:22 PM   No future appointments.

## 2021-04-26 ENCOUNTER — HOSPITAL ENCOUNTER (OUTPATIENT)
Dept: PHYSICAL THERAPY | Age: 76
Discharge: HOME | End: 2021-04-26
Payer: MEDICARE

## 2021-04-26 PROCEDURE — 97110 THERAPEUTIC EXERCISES: CPT

## 2021-04-26 NOTE — PROGRESS NOTES
PHYSICAL THERAPY - DAILY TREATMENT NOTE    Patient Name: Leslie Manriquez        Date: 2021  : 1945   YES Patient  Verified  Visit #:   2     Insurance: Payor: Gwyn [place]ar[place] / Plan: [place] MEDICARE PART A & B / Product Type: Medicare /      In time: 11:03 Out time: 11:48   Total Treatment Time: 39     Medicare/BCBS [place] Time Tracking (below)   Total Timed Codes (min):  45 1:1 Treatment Time:  45     TREATMENT AREA =  Low back pain [M54.5]  Pain in right hip [M25.551]    SUBJECTIVE    Pain Level (on 0 to 10 scale):  2-3   10   Medication Changes/New allergies or changes in medical history, any new surgeries or procedures?    no    If yes, update Summary List   Subjective Functional Status/Changes:  []  No changes reported     States right hip continues to feel better. Still having LBP. States she and her  used to walk daily. She tried to go for a walk yesterday but \"couldn't make it very far\" due to LBP. Reports she is doing SLR, S/L hip abd, and seated piriformis stretch at home. OBJECTIVE      45 min Therapeutic Exercise:  [x]  See flow sheet   Rationale:      increase ROM, increase strength, improve coordination and increase proprioception to improve the patients ability to perform ADL's, gait, and functional mobility with decreased pain. min Patient Education:  YES  Reviewed HEP   []  Progressed/Changed HEP based on:   Issued HEP per handout     Other Objective/Functional Measures:    Began exercise progression per flowsheet. Post Treatment Pain Level (on 0 to 10) scale:   0  / 10     ASSESSMENT    Assessment/Changes in Function:     Patient reported pain in left hip when in S/L position to perform S/L hip abduction - alleviated with verbal cues to assume correct position for exercise.      []  See Progress Note/Recertification   Patient will continue to benefit from skilled PT services to modify and progress therapeutic interventions, address functional mobility deficits, address ROM deficits, address strength deficits, analyze and address soft tissue restrictions, analyze and cue movement patterns, analyze and modify body mechanics/ergonomics, assess and modify postural abnormalities and instruct in home and community integration to attain remaining goals. Progress toward goals / Updated goals: · Short Term Goals: To be accomplished in  2-3  weeks:  1. Patient to be adherent to HEP to facilitate pain control with ADL's. issued HEP per handout  2. Patient to report minimal sleep disturbance due to LBP. 3. Patient to improve right hip ER PROM to > 65 degrees to promote ease in donning/doffing LE shoes. Performed supine piriformis stretch.      PLAN    [x]  Upgrade activities as tolerated YES Continue plan of care   []  Discharge due to :    []  Other:      Therapist: Ita Toledo PT    Date: 4/26/2021 Time: 11:17 AM     Future Appointments   Date Time Provider Lennie Rodriges   4/28/2021  2:00 PM Blaine Patton, PT [facility]

## 2021-04-28 ENCOUNTER — HOSPITAL ENCOUNTER (OUTPATIENT)
Dept: PHYSICAL THERAPY | Age: 76
Discharge: HOME | End: 2021-04-28
Payer: MEDICARE

## 2021-04-28 PROCEDURE — 97110 THERAPEUTIC EXERCISES: CPT

## 2021-04-28 PROCEDURE — 97140 MANUAL THERAPY 1/> REGIONS: CPT

## 2021-04-28 NOTE — PROGRESS NOTES
PHYSICAL THERAPY - DAILY TREATMENT NOTE    Patient Name: Charlene Joseph        Date: 2021  : 1945   YES Patient  Verified  Visit #:   3     Insurance: Payor: Morgan Garrett / Plan: [place] MEDICARE PART A & B / Product Type: Medicare /      In time: 2:05 Out time: 2:45   Total Treatment Time: 40     Medicare/BCBS [place] Time Tracking (below)   Total Timed Codes (min):  40 1:1 Treatment Time:  40     TREATMENT AREA =  Low back pain [M54.5]  Pain in right hip [M25.551]    SUBJECTIVE    Pain Level (on 0 to 10 scale):  5  / 10   Medication Changes/New allergies or changes in medical history, any new surgeries or procedures? NO    If yes, update Summary List   Subjective Functional Status/Changes:  []  No changes reported     Patient reports R lower back and right hip more irritable today. She felt great after her previous PT visit and was able to drive to [facility] without pain in lower back. Reports compliance with prescribed HEP          OBJECTIVE    32 min Therapeutic Exercise:  [x]? See flow sheet   Rationale:      increase ROM, increase strength, improve coordination and increase proprioception to improve the patients ability to perform ADL's, gait, and functional mobility with decreased pain.      8 min Manual Therapy: MFR to left VL, ITB, glute medius to aide in tissue elasticity and pain levels.    Rationale:      decrease pain, increase ROM, increase tissue extensibility, decrease trigger points and increase postural awareness to improve patient's ability to perform functional activities with less pain and compensation   The manual therapy interventions were performed at a separate and distinct time from the therapeutic activities interventions       min Patient Education:  YES  Reviewed HEP   []  Progressed/Changed HEP based on:   Cont with HEP     Other Objective/Functional Measures:    Cont with outlined program and included resisted side stepping using PTB; ms burn reported in appropriate areas. Manual MFR using foam roller performed to R VL and ITB to reduce pain levels; pt liked this technique therefore educated pt on how to perform independently at home using a rolling pin. Post Treatment Pain Level (on 0 to 10) scale:   2-3  / 10     ASSESSMENT    Assessment/Changes in Function:     Reduction of \"tightness\" in hip after session today; session tolerated well; plan to cont progressing as symptoms allow. []  See Progress Note/Recertification   Patient will continue to benefit from skilled PT services to analyze, cue, progress, modify,, demonstrate, instruct, and address, movement patterns, therapeutic interventions, postural abnormalities, soft tissue restrictions, ROM, strength, functional mobility, body mechanics/ergonomics, and home and community integration, to attain remaining goals. Progress toward goals / Updated goals: · Short Term Goals: To be accomplished in  2-3  weeks:  1. Patient to be adherent to HEP to facilitate pain control with ADL's. issued HEP per handout  2. Patient to report minimal sleep disturbance due to LBP. 3. Patient to improve right hip ER PROM to > 65 degrees to promote ease in donning/doffing LE shoes. Performed supine piriformis stretch.        PLAN    []  Upgrade activities as tolerated YES Continue plan of care   []  Discharge due to :    []  Other:      Therapist: Saurabh Mcgovern PT, DPT    Date: 4/28/2021 Time: 9:59 AM     Future Appointments   Date Time Provider Lennie Rodriges   4/28/2021  2:00 PM Richard Avelar PT [facility]   5/3/2021  2:00 PM Jennifer Tong PT [facility]   5/5/2021  2:00 PM Ld Mendoza PT [facility]   5/10/2021  2:00 PM Ricahrd Avelar PT [facility]   5/12/2021  2:00 PM Jennifer Tong PT [facility]   5/17/2021  2:00 PM Richard Avelar PT [facility]   5/19/2021  2:00 PM Jennifer Tong, PT [facility]   5/24/2021  2:00 PM Richard Avelar, PT [facility]   5/26/2021  2:00 PM Estefania Seth, PT [facility]

## 2021-05-03 ENCOUNTER — HOSPITAL ENCOUNTER (OUTPATIENT)
Dept: PHYSICAL THERAPY | Age: 76
Discharge: HOME | End: 2021-05-03
Payer: MEDICARE

## 2021-05-03 PROCEDURE — 97110 THERAPEUTIC EXERCISES: CPT

## 2021-05-03 NOTE — PROGRESS NOTES
PHYSICAL THERAPY - DAILY TREATMENT NOTE    Patient Name: Barbara Baca        Date: 5/3/2021  : 1945   YES Patient  Verified  Visit #:   4     Insurance: Payor: Caitlin Garrison / Plan: [place] MED[place] PART [place] & B / Product Type: Medicare /      In time: 2:02 Out time: 2:52   Total Treatment Time: 50     Medicare/BCBS [place] Time Tracking (below)   Total Timed Codes (min):  50 1:1 Treatment Time:  50     TREATMENT AREA =  Low back pain [M54.5]  Pain in right hip [M25.551]    SUBJECTIVE    Pain Level (on 0 to 10 scale):  4  / 10   Medication Changes/New allergies or changes in medical history, any new surgeries or procedures?    no    If yes, update Summary List   Subjective Functional Status/Changes:  []  No changes reported     \"I woke up with a lot of pain in my right thigh this morning. I tried the rolling pin but that didn't really help. Then I put a heating pad on it and that seemed to help. My lower back always feels weak after I have been walking for a little bit. \"    \"I am concerned that the leg lifts and bridges are hurting my back. \"    Reports compliance with HEP. States she is also performing exercises given to her previously from other health professionals. OBJECTIVE    42 min Therapeutic Exercise:  [x]  See flow sheet   Rationale:      increase ROM and increase strength to improve the patients ability to perform ADL's, gait, and functional mobility with decreased pain. 8 min Manual Therapy: IASTM with FRAM tool (F1) to right distal quads and ITB   Rationale:      decrease pain, increase ROM and increase tissue extensibility to improve patient's ability to perform ADL's, gait, and functional mobility with decreased pain. The manual therapy interventions were performed at a separate and distinct time from the therapeutic activities interventions.      min Patient Education:  YES  Reviewed HEP   []  Progressed/Changed HEP based on:   Advised patient to perform segmental bridge and H/L TA march instead of SLR. Other Objective/Functional Measures:    Substituted H/L TA march for SLR due to c/o's right hip pain with SLR. Instructed patient in segmental bridge. Held H/L hip adduction due to c/o's pain in right hip after 2-3 reps. Post Treatment Pain Level (on 0 to 10) scale:   2  / 10     ASSESSMENT    Assessment/Changes in Function:     Patient able to perform segmental bridge and H/L TA march without c/o's pain. []  See Progress Note/Recertification   Patient will continue to benefit from skilled PT services to modify and progress therapeutic interventions, address functional mobility deficits, address ROM deficits, address strength deficits, analyze and address soft tissue restrictions, analyze and cue movement patterns, analyze and modify body mechanics/ergonomics, assess and modify postural abnormalities and instruct in home and community integration to attain remaining goals. Progress toward goals / Updated goals: · Short Term Goals: To be accomplished in  2-3  weeks:  1. Patient to be adherent to HEP to facilitate pain control with ADL's. reports compliance with HEP  2. Patient to report minimal sleep disturbance due to LBP. 3. Patient to improve right hip ER PROM to > 65 degrees to promote ease in donning/doffing LE shoes.  Performed supine piriformis stretch.      PLAN    [x]  Upgrade activities as tolerated YES Continue plan of care   []  Discharge due to :    []  Other:      Therapist: Daina Brown PT    Date: 5/3/2021 Time: 2:14 PM     Future Appointments   Date Time Provider Lennie Rodriges   5/5/2021  2:00 PM Dez Ambrocio PT [facility]   5/10/2021  2:00 PM Noah Narayan PT [facility]   5/12/2021  2:00 PM Sameera Magana PT [facility]   5/17/2021  2:00 PM Noah Narayan PT [facility]   5/19/2021  2:00 PM Sameera Magana PT [facility]   5/24/2021  2:00 PM Noah Narayan, PT [facility]   5/26/2021  2:00 PM Sameera Magana PT [facility]

## 2021-05-05 ENCOUNTER — HOSPITAL ENCOUNTER (OUTPATIENT)
Dept: PHYSICAL THERAPY | Age: 76
Discharge: HOME | End: 2021-05-05
Payer: MEDICARE

## 2021-05-05 PROCEDURE — 97110 THERAPEUTIC EXERCISES: CPT

## 2021-05-05 NOTE — PROGRESS NOTES
PHYSICAL THERAPY - DAILY TREATMENT NOTE    Patient Name: David Fall        Date: 2021  : 1945   YES Patient  Verified  Visit #:   5     Insurance: Payor: Alicia Han / Plan: [place] MEDICARE PART A & B / Product Type: Medicare /      In time: 1:55 Out time: 2:45   Total Treatment Time: 50     Medicare/BCBS Time Tracking (below)   Total Timed Codes (min):  50 1:1 Treatment Time:  40     TREATMENT AREA = Low back pain [M54.5]  Pain in right hip [M25.551]    SUBJECTIVE    Pain Level (on 0 to 10 scale):   10   Medication Changes/New allergies or changes in medical history, any new surgeries or procedures? NO    If yes, update Summary List   Subjective Functional Status/Changes:  []  No changes reported     \"I've been feeling good all day. May have over done it doing some yard work, but right now it's only in one spot in the lower back. No hip pain.  \"          OBJECTIVE     Therapeutic Procedures:  Min Procedure Specifics + Rationale   n/a [x]  Patient Education (performed throughout session) [x] Review HEP    [] Progressed/Changed HEP based on:   [] proper performance and advancement of Therex/TA   [] reduction in pain level    [] increased functional capacity       [] change in directional preference   40 [x] Therapeutic Exercise   [x]  See Flowsheet   Rationale: increase ROM and increase strength to improve the patients ability to participate in ADL's      Modality rationale: decrease inflammation, decrease pain, increase tissue extensibility and increase muscle contraction/control to improve the patients ability to perform ADL's with greater ease     Min Type Additional Details   10 [x]  Heat         [] pre-BASSAM      [x] post-BASSAM Location:L/S    [] supine              [] prone     [] legs elevated    [] legs flat   [] sitting   [x] Skin assessment post-treatment:  [x]intact [x]redness- no adverse reaction       []redness - adverse reaction:     Other Objective/Functional Measures:  Patient reports \"almost quitting after 1 minute\" on Nu-Step due to fatigue. Added standing therex to promote loaded activity tolerance. Discussed differentiating pain vs muscle fatigue vs joint pain. VC's re: valsalva. Advised patient to use her fingers to count her reps in order to maintain her count. Post Treatment Pain Level (on 0 to 10) scale:   0  / 10     ASSESSMENT    Assessment/Changes in Function:       Deconditioned to BLE as patient has frequent report of fatigue/soreness to LE musculature. Standing therex performed with 2 sets of 5. Self limiting in fear-avoidance and concerns on whether or not an exercise \"might hurt the arthritis\"        Patient will continue to benefit from skilled PT services to modify and progress therapeutic interventions, address functional mobility deficits, address ROM deficits, address strength deficits, analyze and address soft tissue restrictions, analyze and cue movement patterns, analyze and modify body mechanics/ergonomics and instruct in home and community integration  to attain remaining goals   Progress toward goals / Updated goals:    Progressive improvement in functional strength and reduction in pain levels for ADLs'     PLAN    [x]  Upgrade activities as tolerated  [x]  Update interventions per flow sheet YES Continue plan of care   []  Discharge due to :    []  Other:      Therapist: Belkis Lewis \"BJ\" CAMERON Sebastian, Cert. MDT, Cert. DN, Cert. SMT, Dip.  Osteopractic    Date: 5/5/2021 Time: 1:35 PM     Future Appointments   Date Time Provider Lennie Rodriges   5/5/2021  2:00 PM Richa Tristan, PT [facility]   5/10/2021  2:00 PM Naseem Wolf, PT [facility]   5/12/2021  2:00 PM Johana Neal, PT [facility]   5/17/2021  2:00 PM Naseem Wolf, PT [facility]   5/19/2021  2:00 PM Johana Neal, PT [facility]   5/24/2021  2:00 PM Naseem Wolf, PT [facility]   5/26/2021  2:00 PM Johana Neal, PT [facility]

## 2021-05-10 ENCOUNTER — HOSPITAL ENCOUNTER (OUTPATIENT)
Dept: PHYSICAL THERAPY | Age: 76
Discharge: HOME | End: 2021-05-10
Payer: MEDICARE

## 2021-05-10 PROCEDURE — 97110 THERAPEUTIC EXERCISES: CPT

## 2021-05-10 NOTE — PROGRESS NOTES
PHYSICAL THERAPY - DAILY TREATMENT NOTE    Patient Name: Robin Anderson        Date: 5/10/2021  : 1945   YES Patient  Verified  Visit #:   6     Insurance: Payor: Krishsylvia Racer / Plan: [place] MEDICARE PART A & B / Product Type: Medicare /      In time: 2:00 Out time: 2:54   Total Treatment Time: 54     Medicare/BCBS [place] Time Tracking (below)   Total Timed Codes (min):  44 1:1 Treatment Time:  44     TREATMENT AREA =   Low back pain [M54.5]  Pain in right hip [M25.551]    SUBJECTIVE    Pain Level (on 0 to 10 scale):  4  / 10   Medication Changes/New allergies or changes in medical history, any new surgeries or procedures? NO    If yes, update Summary List   Subjective Functional Status/Changes:  []  No changes reported     Patient reports waking up with a lot of lower back pain and right anterior lateral hip pain extending to knee. She notes ability to garden yesterday (repetitive flexing forward) with minimal lower back pain. OBJECTIVE    Therapeutic Procedures:  Min Procedure Specifics + Rationale   n/a [x]? Patient Education (performed throughout session) [x]? Review HEP    []? Progressed/Changed HEP based on:   []? proper performance and advancement of Therex/TA   []? reduction in pain level    []? increased functional capacity       []? change in directional preference   44 [x]? Therapeutic Exercise    [x]? See Flowsheet   Rationale: increase ROM and increase strength to improve the patients ability to participate in ADL's       Modality rationale: decrease inflammation, decrease pain, increase tissue extensibility and increase muscle contraction/control to improve the patients ability to perform ADL's with greater ease      Min Type Additional Details   10 [x]? Heat         []? pre-BASSAM      [x]? post-BASSAM Location:L/S    []? supine              []? prone     []? legs elevated    []? legs flat   []? sitting   [x]? Skin assessment post-treatment:  [x]? intact [x]? redness- no adverse reaction       []? redness - adverse reaction:        min Patient Education:  YES  Reviewed HEP   []  Progressed/Changed HEP based on:   Cont with HEP     Other Objective/Functional Measures: Tolerated progression of repetitions today without onset of pain. Continued with segmental bridging, isometric supine hip flexion as pt reported temporary relief of pain from these exercises in previous visits. Post Treatment Pain Level (on 0 to 10) scale:   2  / 10     ASSESSMENT    Assessment/Changes in Function:     Introduction of open book, tolerated well. Mild onset of neck pain. Good response to segmental bridging; carried this technique over to seated lumbar flexion in sitting. Recommended to patient on stretching prior to getting out of bed due to pain typically being worse first in the morning. []  See Progress Note/Recertification   Patient will continue to benefit from skilled PT services to analyze, cue, progress, modify,, demonstrate, instruct, and address, movement patterns, therapeutic interventions, postural abnormalities, soft tissue restrictions, ROM, strength, functional mobility, body mechanics/ergonomics, and home and community integration, to attain remaining goals. Progress toward goals / Updated goals: · Short Term Goals: To be accomplished in  2-3  weeks:  1. Patient to be adherent to HEP to facilitate pain control with ADL's.  2. Patient to report minimal sleep disturbance due to LBP. 3. Patient to improve right hip ER PROM to > 65 degrees to promote ease in donning/doffing LE shoes. Long Term Goals: To be accomplished in  4-6  weeks:  1. Patient to be Safe and Independent with HEP to self-manage/prevent symptoms after DC. 2. Patient to increase FS FOTO score to > 53 to indicate increased functional independence. 3. Patient to demonstrate safe stair negotiation in reciprocal pattern to promote community mobility.   4. Patient to report return to recreational walking > 15' uninterrupted .        PLAN    []  Upgrade activities as tolerated YES Continue plan of care   []  Discharge due to :    []  Other:      Therapist: Kosta Vences, PT, DPT    Date: 5/10/2021 Time: 8:41 AM     Future Appointments   Date Time Provider Lennie Rodriges   5/10/2021  2:00 PM Milton Wright, PT [facility]   5/12/2021  2:00 PM Rob Dallas, PT [facility]   5/17/2021  2:00 PM Milton Wright PT [facility]   5/19/2021  2:00 PM Rob Dallas, PT [facility]   5/24/2021  2:00 PM Milton Wright, M[facility]   5/26/2021  2:00 PM Rob Dallas PT [facility]

## 2021-05-12 ENCOUNTER — HOSPITAL ENCOUNTER (OUTPATIENT)
Dept: PHYSICAL THERAPY | Age: 76
Discharge: HOME | End: 2021-05-12
Payer: MEDICARE

## 2021-05-12 PROCEDURE — 97110 THERAPEUTIC EXERCISES: CPT

## 2021-05-12 NOTE — PROGRESS NOTES
PHYSICAL THERAPY - DAILY TREATMENT NOTE    Patient Name: Gloria Rizvi        Date: 2021  : 1945   YES Patient  Verified  Visit #:   7     Insurance: Payor: Braxton Solum / Plan: [place] MEDICARE PART A & B / Product Type: Medicare /      In time: 2:08 Out time: 3:03   Total Treatment Time: 54     Medicare/BCBS [place] Time Tracking (below)   Total Timed Codes (min):  45 1:1 Treatment Time:  45     TREATMENT AREA =  Low back pain [M54.5]  Pain in right hip [M25.551]    SUBJECTIVE    Pain Level (on 0 to 10 scale):  4  / 10   Medication Changes/New allergies or changes in medical history, any new surgeries or procedures?    no    If yes, update Summary List   Subjective Functional Status/Changes:  []  No changes reported     \"I felt really good when I left here on Monday. I worked in the garden yesterday and am really hurting today. I did a lot of bending over in the garden. I thought I was doing a good thing but I guess not. \"    Patient reports increased LBP after sitting for > 2 minutes today and walking \"more than a few steps. \"         OBJECTIVE    Modalities Rationale:  decrease pain and increase tissue extensibility to improve patient's ability to perform ADL's, gait, and functional mobility with decreased pain.      min [] Estim, type/location:                                      []  att     []  unatt     []  w/US     []  w/ice    []  w/heat    min []  Mechanical Traction: type/lbs                   []  pro   []  sup   []  int   []  cont    []  before manual    []  after manual    min []  Ultrasound, settings/location:      min []  Iontophoresis w/ dexamethasone, location:                                               []  take home patch       []  in clinic   10 min []  Ice     [x]  Heat    location/position: MHP to LB in supine with B LE's elevated on wedge    min []  Vasopneumatic Device, press/temp:     min []  Other:    [x] Skin assessment post-treatment (if applicable):    [x]  intact []  redness- no adverse reaction     []redness - adverse reaction:      45 min Therapeutic Exercise:  [x]  See flow sheet   Rationale:      increase ROM and increase strength to improve the patients ability to perform ADL's,gait, and functional mobility with decreased pain. min Patient Education:  YES  Reviewed HEP   []  Progressed/Changed HEP based on:   Cont HEP     Other Objective/Functional Measures:    Increased reps with glut sets, H/L hip abd/add, and PPT. Added Bent Knee Fall Out and LTR rhythmic stabs. Post Treatment Pain Level (on 0 to 10) scale:   0  / 10     ASSESSMENT    Assessment/Changes in Function:     Patient reported decreased pain after exercises. []  See Progress Note/Recertification   Patient will continue to benefit from skilled PT services to modify and progress therapeutic interventions, address functional mobility deficits, address ROM deficits, address strength deficits, analyze and address soft tissue restrictions, analyze and cue movement patterns, analyze and modify body mechanics/ergonomics, assess and modify postural abnormalities and instruct in home and community integration to attain remaining goals. Progress toward goals / Updated goals:    Long Term Goals: To be accomplished in  4-6  weeks:  1. Patient to be Safe and Independent with HEP to self-manage/prevent symptoms after DC. 2. Patient to increase FS FOTO score to > 53 to indicate increased functional independence. 3. Patient to demonstrate safe stair negotiation in reciprocal pattern to promote community mobility. 4. Patient to report return to recreational walking > 15' uninterrupted . Patient continues to report pain with walking \"more than a few steps. \"     PLAN    [x]  Upgrade activities as tolerated YES Continue plan of care   []  Discharge due to :    []  Other:      Therapist: Paramjit Jc PT    Date: 5/12/2021 Time: 2:21 PM     Future Appointments   Date Time Provider Department [place]   5/17/2021  2:00 PM Noah Narayan, PT [facility]   5/19/2021  2:00 PM Sameera Magana, PT [facility]   5/24/2021  2:00 PM Noah Narayan, PT [facility]   5/26/2021  2:00 PM Sameera Magana, PT [facility]

## 2021-05-17 ENCOUNTER — HOSPITAL ENCOUNTER (OUTPATIENT)
Dept: PHYSICAL THERAPY | Age: 76
Discharge: HOME | End: 2021-05-17
Payer: MEDICARE

## 2021-05-17 PROCEDURE — 97110 THERAPEUTIC EXERCISES: CPT

## 2021-05-17 NOTE — PROGRESS NOTES
PHYSICAL THERAPY - DAILY TREATMENT NOTE    Patient Name: Carmelita Johnson        Date: 2021  : 1945   YES Patient  Verified  Visit #:     Insurance: Payor: Chase Feeler / Plan: [place] MEDICARE PART A & B / Product Type: Medicare /      In time: 2:00 Out time: 3:00   Total Treatment Time: 60     Medicare/BCBS [place] Time Tracking (below)   Total Timed Codes (min):  50 1:1 Treatment Time:  50     TREATMENT AREA =  Low back pain [M54.5]  Pain in right hip [M25.551]    SUBJECTIVE    Pain Level (on 0 to 10 scale):  4  / 10   Medication Changes/New allergies or changes in medical history, any new surgeries or procedures? NO    If yes, update Summary List   Subjective Functional Status/Changes:  []  No changes reported     Patient reports feeling like physical therapy is not helping with her back pain. She reports feeling the benefit of the strengthening and stretching but that it is not targeting or having any effect on her LBP. Lateral hip pain remains unchanged. OBJECTIVE    35 min Therapeutic Exercise:  [x]? See flow sheet   Rationale:      increase ROM and increase strength to improve the patients ability to perform ADL's,gait, and functional mobility with decreased pain. 15 min Manual Therapy:   [x]?  -UPA bilat lower lumbar (prone)  -LAD bilat supine (supine)  -lat hip distraction using belt (pt supine)  -AP hip joint mob - most beneficial   Rationale:      increase ROM and increase strength to improve the patients ability to perform ADL's,gait, and functional mobility with decreased pain.       Modalities Rationale:    decrease inflammation, decrease pain and increase tissue extensibility to improve patient's ability to perform ADL's gait and functional mobility with decreased pain  10 min []  Ice     [x]  Heat    location/position: Lower back, supine with wedge, post tx   [x] Skin assessment post-treatment (if applicable):    [x]  intact    []  redness- no adverse reaction     []redness - adverse reaction:       min Patient Education:  YES  Reviewed HEP   []  Progressed/Changed HEP based on:   Cont with HEP     Other Objective/Functional Measures:    Due to patient's report of experiencing no change in symptoms, attempted to include new techniques today; (manual therapy included LAD in supine-no change; UPAs lower lumbar spine bilaterally-no change; AP mob to hip joint in supine-some relief to lateral hip pain)    [place] performed in standing, pt very hesitant in trying but was able to complete with no increase in symptoms. Attempted to get through as many of the exercises as possible with time remaining, patient more talkative today and had difficulty redirecting back to exercises. Post Treatment Pain Level (on 0 to 10) scale:   2  / 10     ASSESSMENT    Assessment/Changes in Function:     Patient with mild reduction of lateral hip pain, no change in lower back pain. Reassessment in 2 visits. []  See Progress Note/Recertification   Patient will continue to benefit from skilled PT services to analyze, cue, progress, modify,, demonstrate, instruct, and address, movement patterns, therapeutic interventions, postural abnormalities, soft tissue restrictions, ROM, strength, functional mobility, body mechanics/ergonomics, and home and community integration, to attain remaining goals. Progress toward goals / Updated goals:    Long Term Goals: To be accomplished in  4-6  weeks:  1. Patient to be Safe and Independent with HEP to self-manage/prevent symptoms after DC. 2. Patient to increase FS FOTO score to > 53 to indicate increased functional independence. 3. Patient to demonstrate safe stair negotiation in reciprocal pattern to promote community mobility. 4. Patient to report return to recreational walking > 15' uninterrupted . Patient continues to report pain with walking \"more than a few steps. \"       PLAN    []  Upgrade activities as tolerated YES Continue plan of care   []  Discharge due to :    []  Other:      Therapist: Allison Zaidi, PT, DPT    Date: 5/17/2021 Time: 8:08 AM     Future Appointments   Date Time Provider Lennie Rodriges   5/17/2021  2:00 PM Noah Narayan, PT [facility]   5/19/2021  2:00 PM Sameera Magana PT [facility]   5/24/2021  2:00 PM Noah Narayan PT [facility]   5/26/2021  2:00 PM Sameera Magana PT [facility]

## 2021-05-19 ENCOUNTER — HOSPITAL ENCOUNTER (OUTPATIENT)
Dept: PHYSICAL THERAPY | Age: 76
Discharge: HOME | End: 2021-05-19
Payer: MEDICARE

## 2021-05-19 PROCEDURE — 97140 MANUAL THERAPY 1/> REGIONS: CPT

## 2021-05-19 PROCEDURE — 97110 THERAPEUTIC EXERCISES: CPT

## 2021-05-19 NOTE — PROGRESS NOTES
PHYSICAL THERAPY - DAILY TREATMENT NOTE    Patient Name: Dejah Wick        Date: 2021  : 1945   YES Patient  Verified  Visit #:     Insurance: Payor: Gerhardt Hinders / Plan: [place] MEDICARE PART A & B / Product Type: Medicare /      In time: 2:00 Out time: 2:44   Total Treatment Time: 44     Medicare/BCBS [place] Time Tracking (below)   Total Timed Codes (min):  44 1:1 Treatment Time:  44     TREATMENT AREA =  Low back pain [M54.5]  Pain in right hip [M25.551]    SUBJECTIVE    Pain Level (on 0 to 10 scale):  0  / 10 LB, 2/10 right shoulder   Medication Changes/New allergies or changes in medical history, any new surgeries or procedures?    no    If yes, update Summary List   Subjective Functional Status/Changes:  []  No changes reported     \"I was really hurting after my last session for the rest of that day but I felt really good yesterday. I think we may have done some good last time. \"     \"I can't do exercises where I stand for a long period because it just hurts too bad. \"         OBJECTIVE  34 min Therapeutic Exercise:  [x]  See flow sheet   Rationale:      increase ROM and increase strength to improve the patients ability to perform ADL's,gait, and functional mobility with decreased pain. 10 min Manual Therapy: AP mobs to right hip and right LE distraction    Rationale:      decrease pain, increase ROM and increase tissue extensibility to improve patient's ability to perform ADL's, gait, and functional mobility with decreased pain. The manual therapy interventions were performed at a separate and distinct time from the therapeutic activities interventions. min Patient Education:  YES  Reviewed HEP   []  Progressed/Changed HEP based on:   Cont HEP     Other Objective/Functional Measures:    Continued with stretching and core strengthening exercises per flowsheet. Patient reported decreased pain with right LE distraction.   Reported no effect with AP joint mobs to right hip.     Post Treatment Pain Level (on 0 to 10) scale:   0  / 10 LB, 1/10 right shoulder      ASSESSMENT    Assessment/Changes in Function:     Reassess benefit of manual therapy next assessment. Good response to therapy today as evidenced by decreased pain rating at end of session. []  See Progress Note/Recertification   Patient will continue to benefit from skilled PT services to modify and progress therapeutic interventions, address functional mobility deficits, address ROM deficits, address strength deficits, analyze and address soft tissue restrictions, analyze and cue movement patterns, analyze and modify body mechanics/ergonomics, assess and modify postural abnormalities and instruct in home and community integration to attain remaining goals. Progress toward goals / Updated goals:    Long Term Goals: To be accomplished in  4-6  weeks:  1. Patient to be Safe and Independent with HEP to self-manage/prevent symptoms after DC. Cont HEP  2. Patient to increase FS FOTO score to > 53 to indicate increased functional independence. 3. Patient to demonstrate safe stair negotiation in reciprocal pattern to promote community mobility. Cont strengthening exercises  4. Patient to report return to recreational walking > 15' uninterrupted.        PLAN    [x]  Upgrade activities as tolerated YES Continue plan of care   []  Discharge due to :    []  Other:      Therapist: Deanna Swann PT    Date: 5/19/2021 Time: 1:55 PM     Future Appointments   Date Time Provider Lennie Rodriges   5/19/2021  2:00 PM Michelle Giraldo, PT [facility]   5/24/2021  2:00 PM Jazmin Salamanca PT [facility]   5/26/2021  2:00 PM Michelle Giraldo PT [facility]

## 2021-05-24 ENCOUNTER — HOSPITAL ENCOUNTER (OUTPATIENT)
Dept: PHYSICAL THERAPY | Age: 76
Discharge: HOME | End: 2021-05-24
Payer: MEDICARE

## 2021-05-24 PROCEDURE — 97530 THERAPEUTIC ACTIVITIES: CPT

## 2021-05-24 NOTE — PROGRESS NOTES
PHYSICAL THERAPY - DAILY TREATMENT NOTE    Patient Name: Sharri Davidson        Date: 2021  : 1945   YES Patient  Verified  Visit #:   10     Insurance: Payor: Kiana Plunkett / Plan: [place] MEDICARE PART A & B / Product Type: Medicare /      In time: 2:02 Out time: 2:45   Total Treatment Time: 43     Medicare/BCBS [place] Time Tracking (below)   Total Timed Codes (min):  43 1:1 Treatment Time:  43     TREATMENT AREA =  Low back pain [M54.5]  Pain in right hip [M25.551]    SUBJECTIVE    Pain Level (on 0 to 10 scale):  5  / 10   Medication Changes/New allergies or changes in medical history, any new surgeries or procedures? NO    If yes, update Summary List   Subjective Functional Status/Changes:  []  No changes reported     Patient reports no change in function; had trouble sleeping at night because of ongoing hip pain. Is wanting to get a new doctor. OBJECTIVE    43 min Therapeutic Activity:  [x]? See flow sheet; reassessment and FOTO   Rationale:      increase ROM and increase strength to improve the patients ability to perform ADL's,gait, and functional mobility with decreased pain.         min Patient Education:  Yonatan Catherine   []  Progressed/Changed HEP based on:   Cont with HEP     Other Objective/Functional Measures:    Refer to DC note     Post Treatment Pain Level (on 0 to 10) scale:   5  / 10     ASSESSMENT    Assessment/Changes in Function:     Refer to DC note     []  See Progress Note/Recertification   Patient will continue to benefit from skilled PT services to analyze, cue, progress, modify,, demonstrate, instruct, and address, movement patterns, therapeutic interventions, postural abnormalities, soft tissue restrictions, ROM, strength, functional mobility, body mechanics/ergonomics, and home and community integration, to attain remaining goals. Progress toward goals / Updated goals:    Long Term Goals: To be accomplished in  4-6  weeks:  1.  Patient to be Safe and Independent with HEP to self-manage/prevent symptoms after DC. Cont HEP  2. Patient to increase FS FOTO score to > 53 to indicate increased functional independence. 3. Patient to demonstrate safe stair negotiation in reciprocal pattern to promote community mobility. Cont strengthening exercises  4. Patient to report return to recreational walking > 15' uninterrupted.          PLAN    []  Upgrade activities as tolerated No Continue plan of care   [x]  Discharge due to : Discharge due to minimal improvement made   []  Other:      Therapist: Isaiah George PT, DPT    Date: 5/24/2021 Time: 8:07 AM     Future Appointments   Date Time Provider Lennie Rodriges   5/24/2021  2:00 PM William Coley PT [facility]   5/26/2021  2:00 PM Katiana Blas, MISHA [facility]

## 2021-05-26 ENCOUNTER — APPOINTMENT (OUTPATIENT)
Dept: PHYSICAL THERAPY | Age: 76
End: 2021-05-26
Payer: MEDICARE

## 2021-05-26 NOTE — PROGRESS NOTES
[address] [place] [address] PHYSICAL THERAPY  [address] [place] [address] Marnie Sanchez, [address] - Phone: [phone]  Fax: [phone]  DISCHARGE SUMMARY FOR PHYSICAL THERAPY                  Patient Name: Maureen Delgado : 1945   Treatment/Medical Diagnosis: Low back pain [M54.5]  Pain in right hip [M25.551]   Onset Date: Chronic      Referral Source: José Miguel Tolbert NP Start of Care [facility]): 2021   Prior Hospitalization: See Medical History Provider #: [id number]   Prior Level of Function: Functionally independent, retired   Comorbidities: Scoliosis    Medications: Verified on Patient Summary List   Visits from [facility]: 10 Missed Visits: 0      ROM/Strength                   AROM                    Strength (1-5)  Hip Left Right Left Right   Flexion 120 120 4 4   Extension 10 10 3+ 3+   Abduction 45 45 4 4   ER Supine 40 30 4 4+   IR Supine 15 12 5 4+       Goal/Measure of Progress Goal Met? 1. Patient to be adherent to HEP to facilitate pain control with ADL's. Status at last Eval: Unable Current Status: Able yes   2. Patient to report minimal sleep disturbance due to LBP. Status at last Eval: Hinders restful sleep Current Status: Wakes throughout the night no   3. Patient to improve right hip ER PROM to > 65 degrees to promote ease in donning/doffing LE shoes. Status at last Eval: 49 Current Status: 57 no       Key Functional Changes/Progress:  Reassessment performed today. Pt reports no improvement in function since initiating PT. Pt reports feeling great after PT visits but with very limited carry over between treatments. Demonstrates mild gains in BLE strength and ROM but overall change in function is limited. She reports continuing to wake because of hip and lower back pain. Patient is being discharged from physical therapy today with advise to follow up with her MD for other suggestions/reccomendations on how to manage patient's symptoms.  Pt is able to demonstrate independence with learned exercises. She was instructed to contact the clinic with any questions or concerns and to continue with daily HEP.      Thank you for allowing me the opportunity to assist in this case. Assessments/Recommendations: Discontinue therapy due to lack of appreciable progress towards goals. If you have any questions/comments please contact us directly at [phone]. Thank you for allowing us to assist in the care of your patient. Therapist Signature: Kamille Vazquez PT, DPT Date: 5/26/2021   Reporting Period: 4/22/2021 - 5/26/2021 Time: 05:36 AM      Certification Period: 4/22/2021 - 7/21/2021       NOTE TO PHYSICIAN:  PLEASE COMPLETE THE ORDERS BELOW AND FAX TO   [facility]: [phone]. If you are unable to process this request in 24 hours please contact our office: [phone].    ___ I have read the above report and request that my patient be discharged from therapy.      Physician Signature:        Date:       Time:

## 2021-07-13 RX ORDER — MONTELUKAST SODIUM 10 MG/1
TABLET ORAL
Qty: 90 TABLET | Refills: 0 | Status: SHIPPED | OUTPATIENT
Start: 2021-07-13 | End: 2021-10-19

## 2021-08-02 RX ORDER — VALACYCLOVIR HYDROCHLORIDE 1 G/1
TABLET, FILM COATED ORAL
Qty: 15 TABLET | Refills: 0 | Status: SHIPPED | OUTPATIENT
Start: 2021-08-02

## 2021-10-19 RX ORDER — MONTELUKAST SODIUM 10 MG/1
TABLET ORAL
Qty: 90 TABLET | Refills: 0 | Status: SHIPPED | OUTPATIENT
Start: 2021-10-19 | End: 2022-01-18 | Stop reason: SDUPTHER

## 2022-01-18 RX ORDER — MONTELUKAST SODIUM 10 MG/1
10 TABLET ORAL DAILY
Qty: 90 TABLET | Refills: 0 | Status: SHIPPED | OUTPATIENT
Start: 2022-01-18

## 2022-01-18 NOTE — TELEPHONE ENCOUNTER
I spoke with patient she has already established care with a new provider at another practice and isn't sure why [facility] sent a refill to our office .

## 2022-01-18 NOTE — TELEPHONE ENCOUNTER
Montelukast has been refilled for 90 days the patient needs to establish care with a new PCP prior to any refills after this

## 2022-03-18 PROBLEM — R25.1 INTERMITTENT TREMOR: Status: ACTIVE | Noted: 2017-02-07

## 2022-03-18 PROBLEM — K52.831 COLLAGENOUS COLITIS: Status: ACTIVE | Noted: 2019-09-13

## 2022-03-19 PROBLEM — N18.30 STAGE 3 CHRONIC KIDNEY DISEASE (HCC): Status: ACTIVE | Noted: 2020-01-28

## 2022-03-19 PROBLEM — Z87.11 HX OF PEPTIC ULCER: Status: ACTIVE | Noted: 2018-04-12

## 2022-04-26 ENCOUNTER — TELEPHONE (OUTPATIENT)
Dept: PHYSICAL THERAPY | Age: 77
End: 2022-04-26

## 2022-06-03 ENCOUNTER — HOSPITAL ENCOUNTER (OUTPATIENT)
Dept: PHYSICAL THERAPY | Age: 77
Discharge: HOME | End: 2022-06-03
Payer: MEDICARE

## 2022-06-03 PROCEDURE — 97162 PT EVAL MOD COMPLEX 30 MIN: CPT

## 2022-06-03 PROCEDURE — 97140 MANUAL THERAPY 1/> REGIONS: CPT

## 2022-06-03 NOTE — PROGRESS NOTES
[address], [facility], [facility] - Phone: [phone]  Fax: [phone] / 0881 [facility]  Patient Name: Gildardo Johnson : 1945   Medical   Diagnosis: Right shoulder pain [M25.511] Treatment Diagnosis: Right shoulder pain [M25.511]   Onset Date: Flare up      Referral Source: Danielle Mccann,  Start of Care [facility]): 6/3/2022   Prior Hospitalization: See medical history Provider #: [id number]   Prior Level of Function: Progressive worsening of overhead motion since 2018   Comorbidities: Arthritis, HTN, Scoliosis   Medications: Verified on Patient Summary List   The Plan of Care and following information is based on the information from the initial evaluation.   ===========================================================================================  Subjective: Pt reports an insidious onset of shoulder pain and decreased overhead mobility of the right shoulder since 2018. She has not received any prior PT or skilled treatment and has notice her mobility of the R shoulder has been declining over the years. Pt reports of receiving steroid injections into her neck and lower back for pain relief; Rheumatologist has recently been discontinued and she is in for a possible anesthesia injection from Dr. Pepe Mott in her future follow-up and stated, \"he said there is a full tear in my shoulder. \" Her pain disturbs her sleep at night and often wakes her up in the middle of the night secondary to her R shoulder pain described as Wendy Carolus and sore\". Throughout the day, she reports that she has difficulty with scratching her back and stated, \"trying to wash my hair is a nightmare. \" Other significant PMHx includes having R sided breast cancer in  and has scoliosis which she reports is a major contributor to her shoulder deficits due to her shoulder blade positioning. Pain rating at best: 2/10 with rest and at worst: 8/10 when trying to reach overhead. Assessment / key information:  Pt presents to [facility] at [facility], [facility]. with signs and symptoms congruent with a diagnosis of Right Adhesive Capsulitis. Pt presents with deficits such as pain, decreased AROM/PROM, decreased strength, and decreased functional mobility of the R shoulder. These impairments limits her ability to complete ADL's such as don/doff clothing, complete household duties, and perform any overhead activities. Patient would benefit from skilled intervention to address the above deficits, improve quality of life and return patient to maximum level of prior function. OBJECTIVE:   Posture/Positioning: Forward rounded shoulders with elevated shoulder to the R, lateral winging of the R scapula at rest; in sitting pt cradles R UE in lap; noticeable scoliotic curvature    Standing Shoulder AROM:   Flexion: L: 150 deg,  R: 75 deg w/ p! With lowering  ABD: L: 160 deg,  R: 75 deg w/ pain  Martluis@[place] deg: L: NT,  R: 30 deg, 50 degrees with cane AAROM  Functional ER: R: Ipsilateral upper trap  Evan@[facility]: To the belly    Shoulder PROM:  Flexion: L: NT deg,  R: 90 deg  ABD: L: NT deg,  R: 85 deg  ER, arm at 80 deg ABD:  L: NT deg,  R: 15 deg    Shoulder Strength:  Flexion: L: 4/5 lbs, R: 3+/5 lbs P!  ABD: L: NT lbs, R: 4-/5 lbs  ER: L: 4-/5 lbs, R: 4-/5 lbs  IR: L: 4-/5 lbs, R: 4-/5 lbs    ER reduction test (+) (less ER at ABD than neutral)    HEP: Access Code: [email or web address]  URL: [email or web address]  Date: 06/03/2022 Prepared by: Teddy Perry    Exercises  Supine Shoulder External Rotation AAROM with Dowel - 1 x daily - 7 x weekly - 1 sets - 20 reps - 5 hold  Supine Shoulder Flexion AAROM with Hands Clasped - 1 x daily - 7 x weekly - 1 sets - 10 reps - 20 hold  Seated Scapular Retraction - 1 x daily - 7 x weekly - 2 sets - 10 reps  Seated Shoulder Pendulum Exercise - 1 x daily - 7 x weekly - 1 sets - 10 reps  ===========================================================================================  Eval Complexity: History HIGH Complexity :3+ comorbidities / personal factors will impact the outcome/ POC ;  Examination  MEDIUM Complexity : 3 Standardized tests and measures addressing body structure, function, activity limitation and / or participation in recreation ; Presentation MEDIUM Complexity : Evolving with changing characteristics ; Decision Making MEDIUM Complexity : FOTO score of 26-74; Overall Complexity MEDIUM  Problem List: pain affecting function, decrease ROM, decrease strength, decrease ADL/ functional abilitiies and decrease flexibility/ joint mobility   Treatment Plan may include any combination of the following: Therapeutic exercise, Therapeutic activities, Neuromuscular re-education, Physical agent/modality, Manual therapy, Patient education and Self Care training  Patient / Family readiness to learn indicated by: asking questions, trying to perform skills and interest  Persons(s) to be included in education: patient (P)  Barriers to Learning/Limitations: None  Measures taken, if barriers to learning:    Patient Goal (s): Be able to use her shoulder without pain   Patient self reported health status: good  Rehabilitation Potential: good   Short Term Goals: To be accomplished in  2  weeks  STG1 Pt to report adherence and demonstrate compliance with basic HEP to allow progress between visits  STG2 Pt to report pain <4/10 at worst to allow improved function and QOL  STG3 Pt to report non-disturbed sleep of 7hrs to allow improved healing and QOL   Long Term Goals:  To be accomplished in  4  weeks  LTG1 Pt to improve FOTO score to 63/100 indicating improved function in daily tasks  LTG2 Pt to improve AROM of the R shoulder:   Flexion 120 deg,    deg,   Functional ER to T2,   Functional IR to T12 to improve QOL    Frequency / Duration:   Patient to be seen  2  times per week for 6 weeks  Patient / Caregiver education and instruction: self care, activity modification and exercises  Therapist Signature: Carlita Malcolm PT Date: 8/0/0421   Certification Period: 6/3/2022 - 9/3/2022 Time: 1:37 PM   ===========================================================================================  I certify that the above Physical Therapy Services are being furnished while the patient is under my care. I agree with the treatment plan and certify that this therapy is necessary. Physician Signature:        Date:       Time:                                        Aron Pascual, DO  Please sign and return to [facility] at [facility], [facility]. or you may fax the signed copy to [phone]. Thank you.

## 2022-06-03 NOTE — PROGRESS NOTES
PHYSICAL THERAPY - DAILY TREATMENT NOTE    Patient Name: Yehuda Jones        Date: 6/3/2022  : 1945   YES Patient  Verified  Visit #:   1   of   8  Insurance: Payor: Bastrop Shawn / Plan: [place] MEDICARE PART A & B / Product Type: Medicare /      In time: 1:30 Out time: 2:30   Total Treatment Time: 60     Medicare/BCBS Time Tracking (below)   Total Timed Codes (min):  15 1:1 Treatment Time:  60     TREATMENT AREA =  Right shoulder pain [M25.511]    SUBJECTIVE    Pain Level (on 0 to 10 scale):  3  / 10   Medication Changes/New allergies or changes in medical history, any new surgeries or procedures? NO    If yes, update Summary List   Subjective Functional Status/Changes:  []  No changes reported     See POC          OBJECTIVE  10 min Manual Therapy: PROM ABD and Flex and ER   Rationale:      decrease pain, increase ROM and increase tissue extensibility to improve patient's ability to move the shoulder  The manual therapy interventions were performed at a separate and distinct time from the therapeutic activities interventions. 5 min Self Care: Reviewed diagnosis, prognosis, therapy progression   Rationale:    Improve understanding of injury and therapy to have realistic expectation of therapy to improve compliance/adherence and satisfaction    Billed With/As:   [] TE   [] TA   [] Neuro   [x] Self Care Patient Education: [x] Review HEP    [] Progressed/Changed HEP based on:   [x] Addressed barriers and behaviors     [] Therapeutic Neuroscience Pain Education, metaphor, reframing, contexts.     [x] Sleep Education   [] Body Mechanics [x] Healing Timeframe     [] Self STM with ball at \"the spot\"  [] Walking Program/Global Activity   [] other:         Other Objective/Functional Measures:    See POC note     Post Treatment Pain Level (on 0 to 10) scale:   1 / 10     ASSESSMENT  Assessment/Changes in Function:     See POC     []  See Progress Note/Recertification   Patient will continue to benefit from skilled PT services to modify and progress therapeutic interventions, address functional mobility deficits, address ROM deficits, address strength deficits, analyze and address soft tissue restrictions, analyze and cue movement patterns, analyze and modify body mechanics/ergonomics and assess and modify postural abnormalities to attain goals per POC. Progress toward goals / Updated goals:    Pt evaluated today.  See POC     PLAN  [x]  Upgrade activities as tolerated YES Continue plan of care   []  Discharge due to :    []  Other:      Therapist: Rosario Blair, PT, DPT, OCS    Date: 6/3/2022 Time: 3:51 PM

## 2022-06-08 ENCOUNTER — HOSPITAL ENCOUNTER (OUTPATIENT)
Dept: PHYSICAL THERAPY | Age: 77
Discharge: HOME | End: 2022-06-08
Payer: MEDICARE

## 2022-06-08 PROCEDURE — 97140 MANUAL THERAPY 1/> REGIONS: CPT

## 2022-06-09 ENCOUNTER — HOSPITAL ENCOUNTER (OUTPATIENT)
Dept: PHYSICAL THERAPY | Age: 77
Discharge: HOME | End: 2022-06-09
Payer: MEDICARE

## 2022-06-09 PROCEDURE — 97140 MANUAL THERAPY 1/> REGIONS: CPT

## 2022-06-09 NOTE — PROGRESS NOTES
PHYSICAL THERAPY - DAILY TREATMENT NOTE    Patient Name: Estefana Primrose        Date: 2022  : 1945   YES Patient  Verified  Visit #:   3  of   12  Insurance: Payor: Lola Santos / Plan: [place] MEDICARE PART A & B / Product Type: Medicare /      In time: 6:00 Out time: 6:40   Total Treatment Time: 40     Medicare/BCBS Time Tracking (below)   Total Timed Codes (min):  40 1:1 Treatment Time:  40     TREATMENT AREA =  Right shoulder pain [M25.511]    SUBJECTIVE  Pain Level (on 0 to 10 scale):  0  / 10   Medication Changes/New allergies or changes in medical history, any new surgeries or procedures? NO If yes, update Summary List   Subjective Functional Status/Changes:  []  No changes reported     Says she was sore for an hour or so afterwards but it felt good afterwards and felt better today. OBJECTIVE  MHP during first half with PROM    40 min Manual Therapy: PROM ABD, Flexion, ER. Relaxation techniques   Rationale:      decrease pain, increase ROM and increase tissue extensibility to improve patient's ability to improve right shoulder motion  The manual therapy interventions were performed at a separate and distinct time from the therapeutic activities interventions. Billed With/As:   [] TE   [] TA   [] Neuro   [] Self Care Patient Education: [x] Review HEP    [] Progressed/Changed HEP based on:   [] Addressed barriers and behaviors     [] Therapeutic Neuroscience Pain Education, metaphor, reframing, contexts. [] Sleep Education   [] Body Mechanics [] Healing Timeframe     [] Self STM with ball at \"the spot\"  [] Global Activity   [] other:      Other Objective/Functional Measures:    See Flowsheet for drills, loads and volume.     PROM Right Shoulder:  Flexion: 127 deg (was: 117 deg), (was: 90 deg)  ABD: 85 deg (was 85 deg)  ER, arm at 80deg ABD: 45 deg (was: 40 deg) (was: 15 deg)     Post Treatment Pain Level (on 0 to 10) scale:   0  / 10     ASSESSMENT  Assessment/Changes in Function: Relaxation techniques and PROm to gain motion. []  See Progress Note/Recertification   Patient will continue to benefit from skilled PT services to modify and progress therapeutic interventions, address functional mobility deficits, address ROM deficits, analyze and address soft tissue restrictions, analyze and cue movement patterns, analyze and modify body mechanics/ergonomics and assess and modify postural abnormalities to attain remaining goals.    Progress toward goals / Updated goals:    PROM progressing     PLAN  [x]  Upgrade activities as tolerated YES Continue plan of care   []  Discharge due to :    []  Other:      Therapist: Narcisa Whitman, PT    Date: 6/9/2022 Time: 2:30 PM     Future Appointments   Date Time Provider Lennie Rodriges   6/14/2022 12:00 PM Cindi Quintanilla, PT [id number]   6/16/2022  3:00 PM Cindi Quintanilla PT [facility]   6/21/2022  3:45 PM Cindi Quintanilla, PT [facility]   6/23/2022  3:45 PM Cindi Quintanilla, PT [facility]   6/28/2022  6:00 PM Negro Brewer, PT [phone]

## 2022-06-14 ENCOUNTER — HOSPITAL ENCOUNTER (OUTPATIENT)
Dept: PHYSICAL THERAPY | Age: 77
Discharge: HOME | End: 2022-06-14
Payer: MEDICARE

## 2022-06-14 PROCEDURE — 97140 MANUAL THERAPY 1/> REGIONS: CPT

## 2022-06-14 PROCEDURE — 97110 THERAPEUTIC EXERCISES: CPT

## 2022-06-14 NOTE — PROGRESS NOTES
PHYSICAL THERAPY - DAILY TREATMENT NOTE    Patient Name: Tasneem Pi        Date: 2022  : 1945   YES Patient  Verified  Visit #:      12  Insurance: Payor: Clovia Combe / Plan: [place] MEDICARE PART A & B / Product Type: Medicare /      In time: 12:00 Out time: 12:40   Total Treatment Time: 40     Medicare/BCBS Time Tracking (below)   Total Timed Codes (min):  40 1:1 Treatment Time:  40     TREATMENT AREA =  Right shoulder pain [M25.511]    SUBJECTIVE  Pain Level (on 0 to 10 scale):  0  / 10   Medication Changes/New allergies or changes in medical history, any new surgeries or procedures? NO If yes, update Summary List   Subjective Functional Status/Changes:  []  No changes reported     Look at my shoulder, it lifts up like this. OBJECTIVE  MHP during first half with PROM    30 min Manual Therapy: PROM ABD, Flexion, ER. Relaxation techniques   Rationale:      decrease pain, increase ROM and increase tissue extensibility to improve patient's ability to improve right shoulder motion  The manual therapy interventions were performed at a separate and distinct time from the therapeutic activities interventions. 10 min Therapeutic Exercise: [x]  See flow sheet   Rationale:    increase ROM and increase strength to improve the patients ability to lift the shoulder      Billed With/As:   [] TE   [] TA   [] Neuro   [] Self Care Patient Education: [x] Review HEP    [] Progressed/Changed HEP based on:   [] Addressed barriers and behaviors     [] Therapeutic Neuroscience Pain Education, metaphor, reframing, contexts. [] Sleep Education   [] Body Mechanics [] Healing Timeframe     [] Self STM with ball at \"the spot\"  [] Global Activity   [] other:      Other Objective/Functional Measures:    See Flowsheet for drills, loads and volume.     PROM Right Shoulder:  Flexion: 127 deg (was: 117 deg), (was: 90 deg)  ABD: 85 deg (was 85 deg)  ER, arm at 80deg ABD: 45 deg (was: 40 deg) (was: 15 deg) Post Treatment Pain Level (on 0 to 10) scale:   0  / 10     ASSESSMENT  Assessment/Changes in Function:     Relaxation techniques and PROM to gain motion. []  See Progress Note/Recertification   Patient will continue to benefit from skilled PT services to modify and progress therapeutic interventions, address functional mobility deficits, address ROM deficits, analyze and address soft tissue restrictions, analyze and cue movement patterns, analyze and modify body mechanics/ergonomics and assess and modify postural abnormalities to attain remaining goals.    Progress toward goals / Updated goals:    PROM progressing     PLAN  [x]  Upgrade activities as tolerated YES Continue plan of care   []  Discharge due to :    []  Other:      Therapist: Mamta Saxena PT    Date: 6/14/2022 Time: 2:30 PM     Future Appointments   Date Time Provider Lennie Rodriges   6/16/2022  3:00 PM Ever Rosenberg, PT [facility]   6/21/2022  3:45 PM Ever Rosenberg, PT [facility]   6/23/2022  3:45 PM Ever Rosenberg, PT [facility]   6/28/2022  6:00 PM Dancigers, Gerald Primrose, PT [place] [phone]

## 2022-06-16 ENCOUNTER — HOSPITAL ENCOUNTER (OUTPATIENT)
Dept: PHYSICAL THERAPY | Age: 77
Discharge: HOME | End: 2022-06-16
Payer: MEDICARE

## 2022-06-16 PROCEDURE — 97110 THERAPEUTIC EXERCISES: CPT

## 2022-06-16 PROCEDURE — 97140 MANUAL THERAPY 1/> REGIONS: CPT

## 2022-06-16 NOTE — PROGRESS NOTES
[facility] PHYSICAL THERAPY  [address] [place] [address] Isis Vazquez [address],Supriya Mcclelland, [facility] - Phone: [phone]  Fax: [phone]  PROGRESS NOTE  Patient Name: Estefana Primrose : 1945   Treatment/Medical Diagnosis: Right shoulder pain [M25.511]   Referral Source: Umberto Palacios DO     Date of Initial Visit: 6/3/22 Attended Visits: 5 Missed Visits: -     SUMMARY OF TREATMENT  Pt has been evaluated/assessed and participated in skilled therapeutic exercise, functional activity training, manual therapy interventions, Pt ed, HEP. Subjective: Sleep is way better. CURRENT STATUS  Pt has been seen at [facility], [facility] to address Right shoulder pain [M25.511] . Pt with noted glenohumeral instability, clunking and grinding at end ranges. While pt's shoulder is limited in motion, likely RTC insufficiency is present as well. PN today due to pt seeing Dr Cleve Huggins next week. Pt has made gains in pain control and shoulder range at this time. Significant deficits in elevation, scapular positioning (upper t/s scoliosis and scapular hiking noted, affecting mechanics). Pt additionally has poor RTC strength at this time. GOALS:  · Short Term Goals: To be accomplished in  2  weeks  STG1 Pt to report adherence and demonstrate compliance with basic HEP to allow progress between visits Compliant to date  STG2 Pt to report pain <4/10 at worst to allow improved function and QOL Status: 0-4/10 range 22  STG3 Pt to report non-disturbed sleep of 7hrs to allow improved healing and QOL Status: MET Shoulder is not bothering her at night anymore. · Long Term Goals:  To be accomplished in  4  weeks  LTG1 Pt to improve FOTO score to 63/100 indicating improved function in daily tasks No change 50/100 (22)  LTG2 Pt to improve AROM of the R shoulder: NT  Flexion 120 deg,   ABD 120 deg,   Functional ER to T2,   Functional IR to T12 to improve QOL    RECOMMENDATIONS  *Continue with current progressive POC towards stated Goals  -  If you have any questions/comments please contact us directly at [phone]. Thank you for allowing us to assist in the care of your patient. Therapist Signature: Miguelangel Muir, MISHA Date: 6/16/2022     Time: 3:56 PM   NOTE TO PHYSICIAN:  PLEASE COMPLETE THE ORDERS BELOW AND FAX TO   [facility]: [phone]  If you are unable to process this request in 24 hours please contact our office: [phone]    ___ I have read the above report and request that my patient continue as recommended.   ___ I have read the above report and request that my patient continue therapy with the following changes/special instructions:_________________________________________________________   ___ I have read the above report and request that my patient be discharged from therapy.      Physician Signature:        Date:       Time:                                 Tyson Oliva DO

## 2022-06-16 NOTE — PROGRESS NOTES
PHYSICAL THERAPY - DAILY TREATMENT NOTE    Patient Name: Marv Gilbert        Date: 2022  : 1945   YES Patient  Verified  Visit #:      12  Insurance: Payor: Amy Lewis / Plan: [place] MEDICARE PART A & B / Product Type: Medicare /      In time: 3:05 Out time: 3:45   Total Treatment Time: 40     Medicare/BCBS Time Tracking (below)   Total Timed Codes (min):  40 1:1 Treatment Time:  40     TREATMENT AREA =  Right shoulder pain [M25.511]    SUBJECTIVE  Pain Level (on 0 to 10 scale):  0  / 10   Medication Changes/New allergies or changes in medical history, any new surgeries or procedures? NO If yes, update Summary List   Subjective Functional Status/Changes:  []  No changes reported     Says sleep is better. It doesn't hurt at night any more. OBJECTIVE  MHP during first half with PROM    30 min Manual Therapy: PROM ABD, Flexion, ER. Relaxation techniques   Rationale:      decrease pain, increase ROM and increase tissue extensibility to improve patient's ability to improve right shoulder motion  The manual therapy interventions were performed at a separate and distinct time from the therapeutic activities interventions. 10 min Therapeutic Exercise: [x]  See flow sheet   Rationale:    increase ROM and increase strength to improve the patients ability to lift the shoulder      Billed With/As:   [] TE   [] TA   [] Neuro   [] Self Care Patient Education: [x] Review HEP    [] Progressed/Changed HEP based on:   [] Addressed barriers and behaviors     [] Therapeutic Neuroscience Pain Education, metaphor, reframing, contexts. [] Sleep Education   [] Body Mechanics [] Healing Timeframe     [] Self STM with ball at \"the spot\"  [] Global Activity   [] other:      Other Objective/Functional Measures:    See Flowsheet for drills, loads and volume. HEP:  Access Code: [email or web address]  URL: [email or web address]  Date: 2022  Prepared by: Glenny Ness Rowangers    Exercises  Standing Isometric Shoulder Internal Rotation at Doorway - 1 x daily - 7 x weekly - 1 sets - 10 reps - 10 hold  Standing Isometric Shoulder External Rotation with Doorway - 1 x daily - 7 x weekly - 1 sets - 10 reps - 10 hold  Standing Isometric Shoulder Abduction with Doorway - Arm Bent - 1 x daily - 7 x weekly - 1 sets - 10 reps - 10 hold      PROM Right Shoulder:   Flexion: 130 deg (was: 117 deg), (was: 90 deg)  ABD: 90 deg (was 85 deg)  ER, arm at 80deg ABD: 50 deg (was: 40 deg) (was: 15 deg)     Post Treatment Pain Level (on 0 to 10) scale:   0  / 10     ASSESSMENT  Assessment/Changes in Function:     Pt with noted glenohumeral instability, clunking and grinding at end ranges. While pt's shoulder is limited in motion, likely RTC insufficiency is present as well. PN today due to pt seeing Dr Tameka Sin next week. Pt has made gains in pain control and shoulder range at this time. Significant deficits in elevation, scapular positioning (upper t/s scoliosis and scapular hiking noted, affecting mechanics). Pt additionally has poor RTC strength at this time. []  See Progress Note/Recertification   Patient will continue to benefit from skilled PT services to modify and progress therapeutic interventions, address functional mobility deficits, address ROM deficits, analyze and address soft tissue restrictions, analyze and cue movement patterns, analyze and modify body mechanics/ergonomics and assess and modify postural abnormalities to attain remaining goals. Progress toward goals / Updated goals: · Short Term Goals:  To be accomplished in  2  weeks  STG1 Pt to report adherence and demonstrate compliance with basic HEP to allow progress between visits Compliant to date  STG2 Pt to report pain <4/10 at worst to allow improved function and QOL Status: 0-4/10 range 6/16/22  STG3 Pt to report non-disturbed sleep of 7hrs to allow improved healing and QOL Status: MET Shoulder is not bothering her at night anymore. · Long Term Goals:  To be accomplished in  4  weeks  LTG1 Pt to improve FOTO score to 63/100 indicating improved function in daily tasks No change 50/100 (6/16/22)  LTG2 Pt to improve AROM of the R shoulder: NT  Flexion 120 deg,    deg,   Functional ER to T2,   Functional IR to T12 to improve QOL       PLAN  [x]  Upgrade activities as tolerated YES Continue plan of care   []  Discharge due to :    []  Other:      Therapist: Narcisa Whitman PT    Date: 6/16/2022 Time: 2:30 PM     Future Appointments   Date Time Provider Lennie Rodriges   6/21/2022  3:45 PM Cindi Quintanilla, PT [facility]   6/23/2022  3:45 PM Cindi Quintanilla PT [facility]   6/28/2022  6:00 PM Negro Brewer, PT [facility] [phone]

## 2022-06-20 ENCOUNTER — HOSPITAL ENCOUNTER (OUTPATIENT)
Dept: PHYSICAL THERAPY | Age: 77
Discharge: HOME | End: 2022-06-20
Payer: MEDICARE

## 2022-06-20 PROCEDURE — 97140 MANUAL THERAPY 1/> REGIONS: CPT

## 2022-06-20 NOTE — PROGRESS NOTES
[address] [place] [address] PHYSICAL THERAPY  [address] [place] [address], [facility], 70 [facility] - Phone: [phone]  Fax: [phone]  PROGRESS NOTE  Patient Name: Karlos Park : 1945   Treatment/Medical Diagnosis: Right shoulder pain [M25.511]   Referral Source: Wale Johnson DO     Date of Initial Visit: 6/3/22 Attended Visits: 6 Missed Visits: 0     Significant gains in PROM seen today. Pt seen post right shoulder anesthesia and taken through end range shoulder motions. [facility] positioning cared for and short lever pressure and GH stabilized close to joint. See progression in values. Mild pain at end range reported due to procedure. Will see Patient tomorrow for follow-up. Instructed pt to use Ice prn 20/60 and anti-inflammatories as directed. PROM Right Shoulder: Supine @ 45 deg incline  Flexion: 150 deg (was 130 deg) (was: 117 deg), (was: 90 deg)  ABD: 145 deg (was 90 deg) (was 85 deg)  ER, arm at 80 deg ABD: 77 deg (was 50 deg) (was: 40 deg) (was: 15 deg)  IR at 80 deg ABD: 90 deg  Horizontal ADD: 55 deg  Functional ER (Tip of acromion to elbow with hand behind head): 50 deg    If you have any questions/comments please contact us directly at [phone]. Thank you for allowing us to assist in the care of your patient.     Therapist Signature: Hesham Wen, PT Date: 2022     Time: 2:57 PM

## 2022-06-20 NOTE — PROGRESS NOTES
PHYSICAL THERAPY - DAILY TREATMENT NOTE    Patient Name: Deandre Dhaliwal        Date: 2022  : 1945   YES Patient  Verified  Visit #:     Insurance: Payor: Stone Sainz / Plan: [place] MEDICARE PART A & B / Product Type: Medicare /      In time: 2:00 Out time: 2:45   Total Treatment Time: 45     Medicare/BCBS Time Tracking (below)   Total Timed Codes (min):  40 1:1 Treatment Time:  40     TREATMENT AREA =  Right shoulder pain [M25.511]    SUBJECTIVE  Pain Level (on 0 to 10 scale):  0  / 10   Medication Changes/New allergies or changes in medical history, any new surgeries or procedures? NO If yes, update Summary List   Subjective Functional Status/Changes:  []  No changes reported     Reports saw Dr Hailey Munoz just now and it feels really good. She was having a good day today anyway at 1/10 pain or so. OBJECTIVE    40 min Manual Therapy: Stretches in all planes   Rationale:      decrease pain, increase ROM and increase tissue extensibility to improve patient's ability to improve right shoulder motion  The manual therapy interventions were performed at a separate and distinct time from the therapeutic activities interventions.     Modalities Rationale:     decrease inflammation and decrease pain to improve patient's ability to reach overhead with functional activities   min [] Estim, type/location:                                      []  att     []  unatt     []  w/US     []  w/ice    []  w/heat    min []  Mechanical Traction: type/lbs                   []  pro   []  sup   []  int   []  cont    []  before manual    []  after manual   5 min [x]  Ice     []  Heat    location/position: R Shoulder post manual stretches    min []  Vasopneumatic Device, press/temp:     min []  Other:    [] Skin assessment post-treatment (if applicable):    []  intact    []  redness- no adverse reaction     []redness - adverse reaction:        Billed With/As:   [] TE   [] TA   [] Neuro   [] Self Care Patient Education: [x] Review HEP    [] Progressed/Changed HEP based on:   [] Addressed barriers and behaviors     [] Therapeutic Neuroscience Pain Education, metaphor, reframing, contexts. [] Sleep Education   [] Body Mechanics [] Healing Timeframe     [] Self STM with ball at \"the spot\"  [] Global Activity   [] other:      Other Objective/Functional Measures:    PROM Right Shoulder: Supine @ 45 deg incline  Flexion: 150 deg (was 130 deg) (was: 117 deg), (was: 90 deg)  ABD: 145 deg (was 90 deg) (was 85 deg)  ER, arm at 80 deg ABD: 77 deg (was 50 deg) (was: 40 deg) (was: 15 deg)  IR at 80 deg ABD: 90 deg  Horizontal ADD: 55 deg  Functional ER (Tip of acromion to elbow with hand behind head): 50 deg     Post Treatment Pain Level (on 0 to 10) scale:   0  / 10     ASSESSMENT  Assessment/Changes in Function:     Significant gains in PROM seen today. Pt seen post right shoulder anesthesia and taken through end range shoulder motions. [address] positioning cared for and short lever pressure and GH stabilized close to joint. See progression in values. Mild pain at end range reported due to procedure. Will see Patient tomorrow for follow-up. Instructed pt to use Ice prn 20/60 and anti-inflammatories as directed. []  See Progress Note/Recertification   Patient will continue to benefit from skilled PT services to modify and progress therapeutic interventions, address functional mobility deficits, address ROM deficits, analyze and address soft tissue restrictions, analyze and cue movement patterns, analyze and modify body mechanics/ergonomics and assess and modify postural abnormalities to attain remaining goals. Progress toward goals / Updated goals: · Short Term Goals:  To be accomplished in  2  weeks  STG1 Pt to report adherence and demonstrate compliance with basic HEP to allow progress between visits Compliant to date  STG2 Pt to report pain <4/10 at worst to allow improved function and QOL Status: 0-4/10 range 6/16/22  STG3 Pt to report non-disturbed sleep of 7hrs to allow improved healing and QOL Status: MET Shoulder is not bothering her at night anymore. · Long Term Goals:  To be accomplished in  4  weeks  LTG1 Pt to improve FOTO score to 63/100 indicating improved function in daily tasks No change 50/100 (6/16/22)  LTG2 Pt to improve AROM of the R shoulder: NT  Flexion 120 deg,    deg,   Functional ER to T2,   Functional IR to T12 to improve QOL       PLAN  [x]  Upgrade activities as tolerated YES Continue plan of care   []  Discharge due to :    []  Other:      Therapist: Darío Nava, PT    Date: 6/20/2022 Time: 2:30 PM     Future Appointments   Date Time Provider Lennie Rodriges   6/21/2022  3:45 PM Isabella Yates, PT [facility]   6/23/2022  3:45 PM Isabella Yates PT [facility]   6/28/2022  6:00 PM Bi Brewer, PT [facility]

## 2022-06-21 ENCOUNTER — HOSPITAL ENCOUNTER (OUTPATIENT)
Dept: PHYSICAL THERAPY | Age: 77
Discharge: HOME | End: 2022-06-21
Payer: MEDICARE

## 2022-06-21 PROCEDURE — 97535 SELF CARE MNGMENT TRAINING: CPT

## 2022-06-21 PROCEDURE — 97140 MANUAL THERAPY 1/> REGIONS: CPT

## 2022-06-21 PROCEDURE — 97110 THERAPEUTIC EXERCISES: CPT

## 2022-06-21 NOTE — PROGRESS NOTES
PHYSICAL THERAPY - DAILY TREATMENT NOTE    Patient Name: Larry Escalante        Date: 2022  : 1945   YES Patient  Verified  Visit #:      12  Insurance: Payor: Chevy Mccoy / Plan: [place] MEDICARE PART A & B / Product Type: Medicare /      In time: 3:45 Out time: 4:40   Total Treatment Time: 55     Medicare/BCBS Time Tracking (below)   Total Timed Codes (min):  55 1:1 Treatment Time:  55     TREATMENT AREA =  Right shoulder pain [M25.511]    SUBJECTIVE  Pain Level (on 0 to 10 scale):  0  / 10   Medication Changes/New allergies or changes in medical history, any new surgeries or procedures? NO If yes, update Summary List   Subjective Functional Status/Changes:  []  No changes reported     Says was not sore and slept \"like a rock\" last night. Says she was able to reach into her second shelf. OBJECTIVE  25 min Therapeutic Exercise: [x]  See flow sheet   Rationale:    increase ROM and increase strength to improve the patients ability to lift shoulder for ADLs    20 min Manual Therapy: Stretches in all planes   Rationale:      decrease pain, increase ROM and increase tissue extensibility to improve patient's ability to improve right shoulder motion  The manual therapy interventions were performed at a separate and distinct time from the therapeutic activities interventions. 10 min Self Care: Activity modification, Pt Ed, HEP expansion, demo, handout, tbands cut and delivered.    Rationale:    improve coordination to improve the patients ability to make gains btw visits    Modalities Rationale:     decrease inflammation and decrease pain to improve patient's ability to reach overhead with functional activities   min [] Estim, type/location:                                      []  att     []  unatt     []  w/US     []  w/ice    []  w/heat    min []  Mechanical Traction: type/lbs                   []  pro   []  sup   []  int   []  cont    []  before manual    []  after manual   5 min [x]  Ice []  Heat    location/position: R Shoulder post manual stretches    min []  Vasopneumatic Device, press/temp:     min []  Other:    [] Skin assessment post-treatment (if applicable):    []  intact    []  redness- no adverse reaction     []redness - adverse reaction:        Billed With/As:   [] TE   [] TA   [] Neuro   [] Self Care Patient Education: [x] Review HEP    [] Progressed/Changed HEP based on:   [] Addressed barriers and behaviors     [] Therapeutic Neuroscience Pain Education, metaphor, reframing, contexts. [] Sleep Education   [] Body Mechanics [] Healing Timeframe     [] Self STM with ball at \"the spot\"  [] Global Activity   [] other:      Other Objective/Functional Measures:    PROM Right Shoulder: Supine @ 45 deg incline  Flexion: 150 deg (was 130 deg) (was: 117 deg), (was: 90 deg)  ABD: 145 deg (was 90 deg) (was 85 deg)  ER, arm at 80 deg ABD: 77 deg (was 50 deg) (was: 40 deg) (was: 15 deg)  IR at 80 deg ABD: 90 deg  Horizontal ADD: 55 deg  Functional ER (Tip of acromion to elbow with hand behind head): 50 deg    HEP:  Access Code: [email or web address]  URL: [email or web address]  Date: 06/21/2022  Prepared by: Oscar Civil    Exercises  Standing Isometric Shoulder Internal Rotation at Doorway - 1 x daily - 7 x weekly - 1 sets - 10 reps - 10 hold  Standing Isometric Shoulder External Rotation with Doorway - 1 x daily - 7 x weekly - 1 sets - 10 reps - 10 hold  Standing Isometric Shoulder Abduction with Doorway - Arm Bent - 1 x daily - 7 x weekly - 1 sets - 10 reps - 10 hold  Seated Shoulder Scaption AAROM with Pulley at Side - 1 x daily - 7 x weekly - 3 sets - 10 reps  Standing Shoulder Flexion AAROM with Pulley in Front - 1 x daily - 7 x weekly - 3 sets - 10 reps  Standing Shoulder Row with Anchored Resistance - 1 x daily - 7 x weekly - 2 sets - 10 reps  Seated Scapular Retraction - 1 x daily - 7 x weekly - 1 sets - 10 reps  Shoulder Internal Rotation with Resistance - 1 x daily - 7 x weekly - 3 sets - 10 reps  Shoulder External Rotation with Anchored Resistance - 1 x daily - 7 x weekly - 3 sets - 10 reps  Standing Shoulder Abduction AAROM with Dowel - 1 x daily - 7 x weekly - 1 sets - 10 reps  Standing Single Arm Shoulder Abduction - Palm Down - 1 x daily - 7 x weekly - 3 sets - 10 reps       Post Treatment Pain Level (on 0 to 10) scale:   0  / 10     ASSESSMENT  Assessment/Changes in Function:     Worked manual PROM in all planes followed by AAROM drills, AROM drills and resistance for shoulder stability. Expanded HEP to improve current status. Pt's AROM lags behind PROM values, indicating muscular insufficiency at this time. []  See Progress Note/Recertification   Patient will continue to benefit from skilled PT services to modify and progress therapeutic interventions, address functional mobility deficits, address ROM deficits, analyze and address soft tissue restrictions, analyze and cue movement patterns, analyze and modify body mechanics/ergonomics and assess and modify postural abnormalities to attain remaining goals. Progress toward goals / Updated goals: · Short Term Goals: To be accomplished in  2  weeks  STG1 Pt to report adherence and demonstrate compliance with basic HEP to allow progress between visits Compliant to date  STG2 Pt to report pain <4/10 at worst to allow improved function and QOL Status: 0-4/10 range 6/16/22  STG3 Pt to report non-disturbed sleep of 7hrs to allow improved healing and QOL Status: MET Shoulder is not bothering her at night anymore. · Long Term Goals:  To be accomplished in  4  weeks  LTG1 Pt to improve FOTO score to 63/100 indicating improved function in daily tasks No change 50/100 (6/16/22)  LTG2 Pt to improve AROM of the R shoulder: NT  Flexion 120 deg,    deg,   Functional ER to T2,   Functional IR to T12 to improve QOL       PLAN  [x]  Upgrade activities as tolerated YES Continue plan of care   []  Discharge due to :    []  Other: Therapist: Fauzia Bales, PT    Date: 6/21/2022 Time: 2:30 PM     Future Appointments   Date Time Provider Lennie Rodriges   6/23/2022  3:45 PM Mandi Blanco,  [address] [facility]   6/28/2022  6:00 PM Jono Brewer, PT [phone]

## 2022-06-23 ENCOUNTER — HOSPITAL ENCOUNTER (OUTPATIENT)
Dept: PHYSICAL THERAPY | Age: 77
Discharge: HOME | End: 2022-06-23
Payer: MEDICARE

## 2022-06-23 PROCEDURE — 97140 MANUAL THERAPY 1/> REGIONS: CPT

## 2022-06-23 PROCEDURE — 97110 THERAPEUTIC EXERCISES: CPT

## 2022-06-23 NOTE — PROGRESS NOTES
PHYSICAL THERAPY - DAILY TREATMENT NOTE    Patient Name: Aurelio Dong        Date: 2022  : 1945   YES Patient  Verified  Visit #:     Insurance: Payor: Kayla Medici / Plan: [place] MEDICARE PART A & B / Product Type: Medicare /      In time: 3:55 Out time: 4:43   Total Treatment Time: 48     Medicare/BCBS Time Tracking (below)   Total Timed Codes (min):  38 1:1 Treatment Time:  38     TREATMENT AREA =  Right shoulder pain [M25.511]    SUBJECTIVE  Pain Level (on 0 to 10 scale):  4  / 10   Medication Changes/New allergies or changes in medical history, any new surgeries or procedures? NO If yes, update Summary List   Subjective Functional Status/Changes:  []  No changes reported     Says sore since yesterday. She did the exercises. Maybe the weather, maybe just woke up tight. Says the shoulder was hurting last night. OBJECTIVE  13 min Therapeutic Exercise: [x]  See flow sheet   Rationale:    increase ROM and increase strength to improve the patients ability to lift shoulder for ADLs    25 min Manual Therapy: Stretches in all planes, isoms for the shoulder   Rationale:      decrease pain, increase ROM and increase tissue extensibility to improve patient's ability to improve right shoulder motion  The manual therapy interventions were performed at a separate and distinct time from the therapeutic activities interventions. Modalities Rationale:     decrease inflammation and decrease pain to improve patient's ability to reach overhead with functional activities   min [] Estim, type/location:                                      []  att     []  unatt     []  w/US     []  w/ice    []  w/heat    min []  Mechanical Traction: type/lbs                   []  pro   []  sup   []  int   []  cont    []  before manual    []  after manual   10 min [x]  Ice     []  Heat    location/position: Right shoulder reclined.     min []  Vasopneumatic Device, press/temp:     min []  Other:    [] Skin assessment post-treatment (if applicable):    []  intact    []  redness- no adverse reaction     []redness - adverse reaction:        Billed With/As:   [] TE   [] TA   [] Neuro   [] Self Care Patient Education: [x] Review HEP    [] Progressed/Changed HEP based on:   [] Addressed barriers and behaviors     [] Therapeutic Neuroscience Pain Education, metaphor, reframing, contexts. [] Sleep Education   [] Body Mechanics [] Healing Timeframe     [] Self STM with ball at \"the spot\"  [] Global Activity   [] other:      Other Objective/Functional Measures:    PROM Right Shoulder: Supine @ 45 deg incline  Flexion: 150 deg (was 130 deg) (was: 117 deg), (was: 90 deg)  ABD: 145 deg (was 90 deg) (was 85 deg)  ER, arm at 80 deg ABD: 77 deg (was 50 deg) (was: 40 deg) (was: 15 deg)  IR at 80 deg ABD: 90 deg  Horizontal ADD: 55 deg  Functional ER (Tip of acromion to elbow with hand behind head): 50 deg       Post Treatment Pain Level (on 0 to 10) scale:   2  / 10     ASSESSMENT  Assessment/Changes in Function:     Started session with the PROM, GH grinding and clicking noted. I added manual isoms for shoulder ABD, Flex, IR/ER, ext. Elbow flexion isoms. Re-demo'd and completed HEP as pt reports some difficulty in performance with this. Continue POC. []  See Progress Note/Recertification   Patient will continue to benefit from skilled PT services to modify and progress therapeutic interventions, address functional mobility deficits, address ROM deficits, analyze and address soft tissue restrictions, analyze and cue movement patterns, analyze and modify body mechanics/ergonomics and assess and modify postural abnormalities to attain remaining goals. Progress toward goals / Updated goals: · Short Term Goals:  To be accomplished in  2  weeks  STG1 Pt to report adherence and demonstrate compliance with basic HEP to allow progress between visits Compliant to date  STG2 Pt to report pain <4/10 at worst to allow improved function and QOL Status: 0-4/10 range 6/16/22  STG3 Pt to report non-disturbed sleep of 7hrs to allow improved healing and QOL Status: MET Shoulder is not bothering her at night anymore. · Long Term Goals:  To be accomplished in  4  weeks  LTG1 Pt to improve FOTO score to 63/100 indicating improved function in daily tasks No change 50/100 (6/16/22)  LTG2 Pt to improve AROM of the R shoulder: NT  Flexion 120 deg,    deg,   Functional ER to T2,   Functional IR to T12 to improve QOL       PLAN  [x]  Upgrade activities as tolerated YES Continue plan of care   []  Discharge due to :    []  Other:      Therapist: Martin Izaguirre PT    Date: 6/23/2022 Time: 2:30 PM     Future Appointments   Date Time Provider Lennie Rodriges   6/28/2022  6:00 PM Chas Brewer Members, PT [facility]

## 2022-06-28 ENCOUNTER — HOSPITAL ENCOUNTER (OUTPATIENT)
Dept: PHYSICAL THERAPY | Age: 77
Discharge: HOME | End: 2022-06-28
Payer: MEDICARE

## 2022-06-28 PROCEDURE — 97140 MANUAL THERAPY 1/> REGIONS: CPT

## 2022-06-28 PROCEDURE — 97110 THERAPEUTIC EXERCISES: CPT

## 2022-06-28 NOTE — PROGRESS NOTES
PHYSICAL THERAPY - DAILY TREATMENT NOTE    Patient Name: Reed Campa        Date: 2022  : 1945   YES Patient  Verified  Visit #:     Insurance: Payor: Nava Moffett / Plan: [place] MEDICARE PART A & B / Product Type: Medicare /      In time: 6:00 Out time: 6:55   Total Treatment Time: 55     Medicare/BCBS Time Tracking (below)   Total Timed Codes (min):  45 1:1 Treatment Time:  45     TREATMENT AREA =  Right shoulder pain [M25.511]    SUBJECTIVE  Pain Level (on 0 to 10 scale):  0  / 10   Medication Changes/New allergies or changes in medical history, any new surgeries or procedures? NO If yes, update Summary List   Subjective Functional Status/Changes:  []  No changes reported     Pt states that she feels pretty good and is feeling no pain. Reports to continually bring her arms overhead when laying down in bed and has no pain when performing it. OBJECTIVE  35 min Therapeutic Exercise: [x]  See flow sheet   Rationale:    increase ROM and increase strength to improve the patients ability to lift shoulder for ADLs    10 min Manual Therapy: Stretches in all planes, isoms for the shoulder   Rationale:      decrease pain, increase ROM and increase tissue extensibility to improve patient's ability to improve right shoulder motion  The manual therapy interventions were performed at a separate and distinct time from the therapeutic activities interventions. Modalities Rationale:     decrease inflammation and decrease pain to improve patient's ability to reach overhead with functional activities   min [] Estim, type/location:                                      []  att     []  unatt     []  w/US     []  w/ice    []  w/heat    min []  Mechanical Traction: type/lbs                   []  pro   []  sup   []  int   []  cont    []  before manual    []  after manual   10 min [x]  Ice     []  Heat    location/position: Right shoulder reclined.     min []  Vasopneumatic Device, press/temp: min []  Other:    [] Skin assessment post-treatment (if applicable):    []  intact    []  redness- no adverse reaction     []redness - adverse reaction:        Billed With/As:   [] TE   [] TA   [] Neuro   [] Self Care Patient Education: [x] Review HEP    [] Progressed/Changed HEP based on:   [] Addressed barriers and behaviors     [] Therapeutic Neuroscience Pain Education, metaphor, reframing, contexts. [] Sleep Education   [] Body Mechanics [] Healing Timeframe     [] Self STM with ball at \"the spot\"  [] Global Activity   [] other:      Other Objective/Functional Measures:    PROM Right Shoulder: Supine @ 45 deg incline  Flexion: 150 deg (was 130 deg) (was: 117 deg), (was: 90 deg)  ABD: 145 deg (was 90 deg) (was 85 deg)  ER, arm at 80 deg ABD: 77 deg (was 50 deg) (was: 40 deg) (was: 15 deg)  IR at 80 deg ABD: 90 deg  Horizontal ADD: 55 deg  Functional ER (Tip of acromion to elbow with hand behind head): 50 deg       Post Treatment Pain Level (on 0 to 10) scale:   0  / 10     ASSESSMENT  Assessment/Changes in Function:     Pt is 1 week post injection/CARINE with good maintenance of PROM. AROM is lacking due to RTC insufficiency (known supraspinatus tear) and GH instability when loaded and at end range. Notable grinding/clicking with [address] joint with assessment with special tests such as load and shift, anterior/posterior drawer, and with inferior mobilizations. Has instability with ER and IR isometrics with a noticeable clunk at variable angles of flexion/abduction. Pt's PROM is functional and and focus is currently on RTC/shoulder strength, which additionally is weak and causes some clicking. Continue POC.      []  See Progress Note/Recertification   Patient will continue to benefit from skilled PT services to modify and progress therapeutic interventions, address functional mobility deficits, address ROM deficits, analyze and address soft tissue restrictions, analyze and cue movement patterns, analyze and modify body mechanics/ergonomics and assess and modify postural abnormalities to attain remaining goals. Progress toward goals / Updated goals: · Short Term Goals: To be accomplished in  2  weeks  STG1 Pt to report adherence and demonstrate compliance with basic HEP to allow progress between visits Compliant to date  STG2 Pt to report pain <4/10 at worst to allow improved function and QOL Status: 0-4/10 range 6/16/22  STG3 Pt to report non-disturbed sleep of 7hrs to allow improved healing and QOL Status: MET Shoulder is not bothering her at night anymore. · Long Term Goals: To be accomplished in  4  weeks  LTG1 Pt to improve FOTO score to 63/100 indicating improved function in daily tasks No change 50/100 (6/16/22)  LTG2 Pt to improve AROM of the R shoulder: NT  Flexion 120 deg,    deg,   Functional ER to T2,   Functional IR to T12 to improve QOL       PLAN  [x]  Upgrade activities as tolerated YES Continue plan of care   []  Discharge due to :    []  Other:      Therapist: Jose Street PT    Date: 6/28/2022 Time: 2:30 PM     No future appointments.

## 2022-07-01 ENCOUNTER — HOSPITAL ENCOUNTER (OUTPATIENT)
Dept: PHYSICAL THERAPY | Age: 77
Discharge: HOME | End: 2022-07-01
Payer: MEDICARE

## 2022-07-01 PROCEDURE — 97140 MANUAL THERAPY 1/> REGIONS: CPT

## 2022-07-01 PROCEDURE — 97110 THERAPEUTIC EXERCISES: CPT

## 2022-07-01 NOTE — PROGRESS NOTES
PHYSICAL THERAPY - DAILY TREATMENT NOTE    Patient Name: Rosa Tirado        Date: 2022  : 1945   YES Patient  Verified  Visit #:   10  of   12  Insurance: Payor: Sarabjit Seeds / Plan: [place] MEDICARE PART A & B / Product Type: Medicare /      In time: 12:45 Out time: 1:25   Total Treatment Time: 40     Medicare/BCBS Time Tracking (below)   Total Timed Codes (min):  40 1:1 Treatment Time:  40     TREATMENT AREA =  Right shoulder pain [M25.511]    SUBJECTIVE  Pain Level (on 0 to 10 scale):  0  / 10   Medication Changes/New allergies or changes in medical history, any new surgeries or procedures? NO If yes, update Summary List   Subjective Functional Status/Changes:  []  No changes reported     Says the arm felt good and the pain is gone since having the shot. Says she noticed her shoulder is elevated and looks marly. Concerns over the clicking. OBJECTIVE  25 min Therapeutic Exercise: [x]  See flow sheet   Rationale:    increase ROM and increase strength to improve the patients ability to lift shoulder for ADLs    15 min Manual Therapy: Stretches in all planes, isoms for the shoulder   Rationale:      decrease pain, increase ROM and increase tissue extensibility to improve patient's ability to improve right shoulder motion  The manual therapy interventions were performed at a separate and distinct time from the therapeutic activities interventions. Billed With/As:   [] TE   [] TA   [] Neuro   [] Self Care Patient Education: [x] Review HEP    [] Progressed/Changed HEP based on:   [] Addressed barriers and behaviors     [] Therapeutic Neuroscience Pain Education, metaphor, reframing, contexts.     [] Sleep Education   [] Body Mechanics [] Healing Timeframe     [] Self STM with ball at \"the spot\"  [] Global Activity   [] other:      Other Objective/Functional Measures:    PROM Right Shoulder: Supine @ 45 deg incline  Flexion: 150 deg (was 130 deg) (was: 117 deg), (was: 90 deg)  ABD: 145 deg (was 90 deg) (was 85 deg)  ER, arm at 80 deg ABD: 77 deg (was 50 deg) (was: 40 deg) (was: 15 deg)  IR at 80 deg ABD: 90 deg  Horizontal ADD: 55 deg  Functional ER (Tip of acromion to elbow with hand behind head): 50 deg    AROM of Right :  Flexion: 70 deg  ABD: 90 deg with compensations  MICHELLE: Ipsilateral upper trap  FIR: T9     Post Treatment Pain Level (on 0 to 10) scale:   0  / 10     ASSESSMENT  Assessment/Changes in Function:     Pt is 1.5 weeks post injection/CARINE with good maintenance of PROM. AROM is lacking due to RTC insufficiency (known supraspinatus tear) and GH instability when loaded and at end range. Notable grinding/clicking with [facility] joint with assessment with special tests such as load and shift, anterior/posterior drawer, and with inferior mobilizations. Has instability with ER and IR isometrics with a noticeable clunk at variable angles of flexion/abduction. Pt's PROM is functional and and focus is currently on RTC/shoulder strength, which additionally is weak and causes some clicking. Continue POC. []  See Progress Note/Recertification   Patient will continue to benefit from skilled PT services to modify and progress therapeutic interventions, address functional mobility deficits, address ROM deficits, analyze and address soft tissue restrictions, analyze and cue movement patterns, analyze and modify body mechanics/ergonomics and assess and modify postural abnormalities to attain remaining goals. Progress toward goals / Updated goals: · Short Term Goals: To be accomplished in  2  weeks  STG1 Pt to report adherence and demonstrate compliance with basic HEP to allow progress between visits Compliant to date  STG2 Pt to report pain <4/10 at worst to allow improved function and QOL Status: 0-4/10 range 6/16/22  STG3 Pt to report non-disturbed sleep of 7hrs to allow improved healing and QOL Status: MET Shoulder is not bothering her at night anymore. · Long Term Goals:  To be accomplished in  4 weeks  LTG1 Pt to improve FOTO score to 63/100 indicating improved function in daily tasks No change 50/100 (6/16/22)  LTG2 Pt to improve AROM of the R shoulder: NT  Flexion 120 deg,    deg,   Functional ER to T2,   Functional IR to T12 to improve QOL       PLAN  [x]  Upgrade activities as tolerated YES Continue plan of care   []  Discharge due to :    []  Other:      Therapist: Mendy Bob PT    Date: 7/1/2022 Time: 2:30 PM     Future Appointments   Date Time Provider Lennie Rodriges   7/5/2022 12:00 PM Vivienne To, PT [facility]   7/7/2022 12:00 PM Vivienne To, PT [facility]   7/11/2022  3:30 PM Bernie Majano, PT [facility]

## 2022-07-05 ENCOUNTER — APPOINTMENT (OUTPATIENT)
Dept: PHYSICAL THERAPY | Age: 77
End: 2022-07-05
Payer: MEDICARE

## 2022-07-07 ENCOUNTER — HOSPITAL ENCOUNTER (OUTPATIENT)
Dept: PHYSICAL THERAPY | Age: 77
Discharge: HOME | End: 2022-07-07
Payer: MEDICARE

## 2022-07-07 PROCEDURE — 97140 MANUAL THERAPY 1/> REGIONS: CPT

## 2022-07-07 PROCEDURE — 97535 SELF CARE MNGMENT TRAINING: CPT

## 2022-07-07 NOTE — PROGRESS NOTES
PHYSICAL THERAPY - DAILY TREATMENT NOTE    Patient Name: Jodi Narayan        Date: 2022  : 1945   yes Patient  Verified  Visit #:     Insurance: Payor: Vianca Members / Plan: [place] MEDICARE PART A & B / Product Type: Medicare /      In time: 1200 Out time: 2306   Total Treatment Time: 33     Medicare/BCBS Time Tracking (below)   Total Timed Codes (min):  33 1:1 Treatment Time:  33     TREATMENT AREA =  Right shoulder pain [M25.511]    SUBJECTIVE  Pain Level (on 0 to 10 scale):  0  / 10   Medication Changes/New allergies or changes in medical history, any new surgeries or procedures?    no  If yes, update Summary List   Subjective Functional Status/Changes:  []  No changes reported     Patient reports she was not feeling well earlier this week and that is why she cancelled her appointment. Patient states she hasn't been able to perform her HEP in a few days as well. OBJECTIVE  20 min Manual Therapy: R shoulder PROM all planes   Rationale:      decrease pain, increase ROM, increase tissue extensibility and decrease trigger points to improve patient's ability to perform reaching activities. The manual therapy interventions were performed at a separate and distinct time from the therapeutic activities interventions. 13 min Self Care: Discussed current status, rationale behind symptoms, prognosis with PT   Rationale:    increase ROM to improve the patients ability to perform household activities. Billed With/As:   [] TE   [] TA   [] Neuro   [x] Self Care Patient Education: [x] Review HEP    [] Progressed/Changed HEP based on:   [] positioning   [] body mechanics   [] transfers   [] heat/ice application    [] other:      Other Objective/Functional Measures:    Patient presenting with significant crepitus with \"ratchet\" type sensation in her [address] joint during PROM treatment. Patient presentation consistent with significant OA to this area.   Patient demonstrating significant shoulder hike with active abduction and flexion to 90 degrees indicating RC tear     Post Treatment Pain Level (on 0 to 10) scale:   0  / 10     ASSESSMENT  Assessment/Changes in Function:     Educated patient on likely ceiling with PT based upon OA and RCT presentation during today's session. Patient stated she is likely not interested in surgical intervention at this time. []  See Progress Note/Recertification   Patient will continue to benefit from skilled PT services to modify and progress therapeutic interventions, address functional mobility deficits, address ROM deficits, address strength deficits, analyze and address soft tissue restrictions, analyze and cue movement patterns, analyze and modify body mechanics/ergonomics and assess and modify postural abnormalities to attain remaining goals.    Progress toward goals / Updated goals:    Slow progress with additional AROM improvements at this time     PLAN  [x]  Upgrade activities as tolerated yes Continue plan of care   []  Discharge due to :    []  Other:      Therapist: Nika Aguero, PT    Date: 7/7/2022 Time: 1:27 PM     Future Appointments   Date Time Provider Lennie Rodriges   7/11/2022  3:30 PM Cortney Lyn, PT [id number]

## 2022-07-11 ENCOUNTER — HOSPITAL ENCOUNTER (OUTPATIENT)
Dept: PHYSICAL THERAPY | Age: 77
Discharge: HOME | End: 2022-07-11
Payer: MEDICARE

## 2022-07-11 PROCEDURE — 97535 SELF CARE MNGMENT TRAINING: CPT

## 2022-07-11 PROCEDURE — 97110 THERAPEUTIC EXERCISES: CPT

## 2022-07-11 NOTE — PROGRESS NOTES
[address],[facility], [address] - Phone: [phone]  Fax: [phone]  [address] [place] [place] [address] PHYSICAL THERAPY          Patient Name: Jose Ash : 1945   Treatment/Medical Diagnosis: Right shoulder pain [M25.511]   Onset Date: Flare up     Referral Source: Cherelle Mustafa DO Start of Care [facility]): 6/3/22   Prior Hospitalization: See Medical History Provider #: [id number]   Prior Level of Function: Progressive worsening of OH motions since 2018   Comorbidities: Arthritis, Scoliosis, HTN   Medications: Verified on Patient Summary List   Visits from [facility]: 12 Missed Visits: 1     Goal/Measure of Progress Goal Met? 1.  Pt to improve FOTO score to 63/100 indicating improved function in daily tasks    Status at last Eval: 50/100 Current Status: 43/100 no   2. Pt to improve AROM of the R shoulder:   Flexion 120 deg,    deg,   Functional ER to T2,   Functional IR to T12 to improve Q   Status at last Eval: n/a Current Status: R shoulder flex 89, abd 79, MICHELLE unable to perform, FIR T10 Slowly progressing   Key Functional Changes/Progress: Patient demonstrates good progress in regards to her PROM of her R shoulder since her IE. Patient's PROM is WNL for all planes, however continues to experience significant crepitus throughout movement indicating arthritic changes. Patient continues to demonstrate significant strength limitations into all planes and should be the focus of her therapy moving forward. Additional gains in AROM are likely to be limited from her current status based upon integrity of both the [address] joint and the rotator cuff.    Problem List: pain affecting function, decrease ROM, decrease strength, decrease activity tolerance and decrease flexibility/ joint mobility   Treatment Plan may include any combination of the following: Therapeutic exercise, Therapeutic activities, Neuromuscular re-education, Physical agent/modality, Manual therapy, Patient education and Functional mobility training  Patient Goal(s) has been updated and includes:      Goals for this certification period include and are to be achieved in   4  weeks:  1. Continue with LTG#1  2. Continue with LTG#2  3. Patient will demonstrate ability to lift a 3 pound weight to 80-90 degrees of shoulder flexion with good control and without pain in order to improve tolerance to household activities. Frequency / Duration:   Patient to be seen   2   times per week for   4    weeks:    Assessments/Recommendations: Patient would benefit from continued PT 2x/week for 4 weeks with emphasis on improving strength deficits within available ranges of AROM  If you have any questions/comments please contact us directly at [phone]. Thank you for allowing us to assist in the care of your patient. Therapist Signature: Sav Rodriguez PT Date: 6/22/0586   Certification Period:  Reporting Period: 6/3/22-7/11/22 6/20/22-7/11/22 Time: 4:07 PM   NOTE TO PHYSICIAN:  PLEASE COMPLETE THE ORDERS BELOW AND FAX TO   [facility]: [phone]  If you are unable to process this request in 24 hours please contact our office: [phone]    ___ I have read the above report and request that my patient continue as recommended.   ___ I have read the above report and request that my patient continue therapy with the following changes/special instructions: ________________________________________________   ___ I have read the above report and request that my patient be discharged from therapy.      Physician Signature:        Date:       Time:                                       Noris Franco DO

## 2022-07-11 NOTE — PROGRESS NOTES
PHYSICAL THERAPY - DAILY TREATMENT NOTE    Patient Name: Jodi Narayan        Date: 2022  : 1945   yes Patient  Verified  Visit #:   12   of   12(+8)  Insurance: Payor: [place] MEDICARE / Plan: VA MEDICARE PART A & B / Product Type: Medicare /      In time: 328 Out time: 408   Total Treatment Time: 40     Medicare/BCBS Time Tracking (below)   Total Timed Codes (min):  40 1:1 Treatment Time:  40     TREATMENT AREA =  Right shoulder pain [M25.511]    SUBJECTIVE  Pain Level (on 0 to 10 scale):  0  / 10   Medication Changes/New allergies or changes in medical history, any new surgeries or procedures?    no  If yes, update Summary List   Subjective Functional Status/Changes:  []  No changes reported     See PN          OBJECTIVE  28 min Therapeutic Exercise:  [x]  See flow sheet   Rationale:      increase ROM and increase strength to improve the patients ability to perform carrying activities. 12 min Self Care: Discussed progress to date, future POC, prognosis   Rationale:    increase ROM to improve the patients ability to perform household activities. Billed With/As:   [] TE   [] TA   [] Neuro   [x] Self Care Patient Education: [x] Review HEP    [] Progressed/Changed HEP based on:   [] positioning   [] body mechanics   [] transfers   [] heat/ice application    [] other:      Other Objective/Functional Measures:    1:1 TE 28'  See PN     Post Treatment Pain Level (on 0 to 10) scale:   0  / 10     ASSESSMENT  Assessment/Changes in Function:     See PN     []  See Progress Note/Recertification   Patient will continue to benefit from skilled PT services to modify and progress therapeutic interventions, address functional mobility deficits, address ROM deficits, address strength deficits, analyze and address soft tissue restrictions, analyze and cue movement patterns, analyze and modify body mechanics/ergonomics and assess and modify postural abnormalities to attain remaining goals.    Progress toward goals / Updated goals:    See PN     PLAN  [x]  Upgrade activities as tolerated yes Continue plan of care   []  Discharge due to :    []  Other:      Therapist: Ezequiel Orellana PT    Date: 7/11/2022 Time: 4:18 PM     Future Appointments   Date Time Provider Lennie Rodriges   7/15/2022  2:30 PM Diana Rdz, PT [facility]   7/19/2022  4:15 PM Diana Rdz, [facility] [id number]

## 2022-07-15 ENCOUNTER — HOSPITAL ENCOUNTER (OUTPATIENT)
Dept: PHYSICAL THERAPY | Age: 77
Discharge: HOME | End: 2022-07-15
Payer: MEDICARE

## 2022-07-15 PROCEDURE — 97535 SELF CARE MNGMENT TRAINING: CPT

## 2022-07-19 ENCOUNTER — APPOINTMENT (OUTPATIENT)
Dept: PHYSICAL THERAPY | Age: 77
End: 2022-07-19
Payer: MEDICARE

## 2022-08-02 ENCOUNTER — HOSPITAL ENCOUNTER (OUTPATIENT)
Dept: PHYSICAL THERAPY | Age: 77
Discharge: HOME | End: 2022-08-02
Payer: MEDICARE

## 2022-08-02 PROCEDURE — 97535 SELF CARE MNGMENT TRAINING: CPT

## 2022-08-02 PROCEDURE — 97110 THERAPEUTIC EXERCISES: CPT

## 2022-08-02 PROCEDURE — 97140 MANUAL THERAPY 1/> REGIONS: CPT

## 2022-08-02 NOTE — PROGRESS NOTES
[address],[facility], [address] [facility] - Phone: [phone]  Fax: [phone] SUMMARY FOR PHYSICAL THERAPY          Patient Name: Adali Snider : 1945   Treatment/Medical Diagnosis: Right shoulder pain [M25.511]   Onset Date: Flare up      Referral Source: Rogerio Louise DO Start of Care [facility]): 6/3/22   Prior Hospitalization: See Medical History Provider #: [id number]   Prior Level of Function: Progressive worsening of OH motions since 2018   Comorbidities: Arthritis, Scoliosis, HTN   Medications: Verified on Patient Summary List   Visits from [facility]: 14 Missed Visits: 1       SUBJECTIVE: Pt reports she saw an orthopedic Kaushik Grace) because she talked with her optomitrist and they recommended seeing a surgeon, and they did xrays and she got a referral for PT and they did an injection and she will follow up with them and maybe get an MRI. The PA thought she would benefit from a TSA, but she thinks the doctor was not sure and she thinks she doesn't have an appointment with Dr Bandar Oliveira. She reports she doesn't want surgery, but she wishes to cancel this case. GOALS:  Goal/Measure of Progress Goal Met? 1.  Pt to improve FOTO score to 63/100 indicating improved function in daily tasks    Status at last Eval: 50/100 Current Status: 38/100 (was 43/100 at last PN) no   2. Pt to improve AROM of the R shoulder:  Flexion 120 deg,   deg,  Functional ER to T2,  Functional IR to T12 to improve Q   Status at last Eval: n/a Current Status: R shoulder flex 72, abd 55, MICHELLE unable to perform, FIR MET at T8 Minimal progress      3. Patient will demonstrate ability to lift a 3 pound weight to 80-90 degrees of shoulder flexion with good control and without pain in order to improve tolerance to household activities.  3# lift NOT MET: L: 145 deg, R: 42 deg    Key Functional Changes/Progress: Pt presenting to session today after not being seen since 7/15/22 as pt was seeking orthopedic appointment. Pt reports she does not anticipate seeing Dr Dylan Klein and wishes to be seen by Dr Payton Rodriguez for further follow-ups. Pt presenting with similar presentation and unclear status on seeking orthopediuc surgery at this time. Pt presenting with arthritic shoulder and RTC insufficieny affecting QOL. Pt with WNL PROM, yet AROM and strength are compromised by arthritic instability and RTC tears (based on movement patterns). Right shoulder flexion PROM: 145 deg  Right shoulder flexion AROM: 72 deg  Right shoulder flexion holding 3lb: 40 deg  Assessments/Recommendations: Discontinue therapy due to lack of appreciable progress towards goals. If you have any questions/comments please contact us directly at [phone]. Thank you for allowing us to assist in the care of your patient. Therapist Signature: Tyrone Colbert PT Date: 8/2/22   Reporting Period: 7/11/22 - 8/2/22 Time: 7:07 PM   NOTE TO PHYSICIAN:  Your patient's insurance requires this discharge note be signed and returned. PLEASE COMPLETE THE ORDERS BELOW AND RETURN TO:  [facility] PHYSICAL THERAPY     ___ I have read the above report and request that my patient be discharged from therapy.       Physician Signature:                                                               Date:                                        Time:                        Kasandra Padilla DO

## 2022-08-02 NOTE — PROGRESS NOTES
PHYSICAL THERAPY - DAILY TREATMENT NOTE    Patient Name: Joanne Mondragon        Date: 2022  : 1945   yes Patient  Verified  Visit #:     Insurance: Payor: Airam Hazard / Plan: [place] MEDICARE PART A & B / Product Type: Medicare /      In time: 4:30 Out time: 5:20   Total Treatment Time: 50     Medicare/BCBS Time Tracking (below)   Total Timed Codes (min):  30 1:1 Treatment Time:  30     TREATMENT AREA =  Right shoulder pain [M25.511]    SUBJECTIVE  Pain Level (on 0 to 10 scale):  0  / 10   Medication Changes/New allergies or changes in medical history, any new surgeries or procedures?    no  If yes, update Summary List   Subjective Functional Status/Changes:  []  No changes reported     Pt reports she saw an orthopedic Eduardohaylee Ledbetter) because she talked with her optomitrist and they recommended seeing a surgeon, and they did xrays and she got a referral for PT and they did an injection and she will follow up with them and maybe get an MRI. The PA thought she would benefit from a TSA, but she thinks the doctor was not sure and she thinks she doesn't have an appointment with Dr Kirill Salcedo. She reports she doesn't want surgery, but she wishes to cancel this case. OBJECTIVE  5 minutes of CP post session. 25 min Therapeutic Exercise: [x]  See flow sheet   Rationale:    increase ROM and increase strength to improve the patients ability to improve shoulder motion    10 min Manual Therapy: PROM and isoms, left shoulder. Rationale:      increase ROM to improve patient's ability to control muscle strength and range  The manual therapy interventions were performed at a separate and distinct time from the therapeutic activities interventions. 10 min Self Care: Discussed current symptoms, prognosis, possible benefits of surgery, recovery from surgery   Rationale:    increase ROM to improve the patients ability to perform self care activities.      Billed With/As:   [] TE   [] TA   [] Neuro   [x] Self Care Patient Education: [x] Review HEP    [] Progressed/Changed HEP based on:   [] positioning   [] body mechanics   [] transfers   [] heat/ice application    [] other:      Other Objective/Functional Measures:    Right shoulder flexion PROM: 145 deg  Right shoulder flexion AROM: 72 deg  Right shoulder flexion holding 3lb: 40 deg     Post Treatment Pain Level (on 0 to 10) scale:   0  / 10     ASSESSMENT  Assessment/Changes in Function:     Pt presenting to session today after not being seen since 7/15/22 as pt was seeking orthopedic appointment. Pt reports she does not anticipate seeing Dr Velvet Brown and wishes to be seen by Dr Wenda Alpers for further follow-ups. Pt presenting with similar presentation and unclear status on seeking orthopediuc surgery at this time. Pt presenting with arthritic shoulder and RTC insufficieny affecting QOL. Pt with WNL PROM, yet AROM and strength are compromised by arthritic instability and RTC tears (based on movement patterns). []  See Progress Note/Recertification   Patient will continue to benefit from skilled PT services to modify and progress therapeutic interventions, address functional mobility deficits, address ROM deficits, address strength deficits, analyze and address soft tissue restrictions, analyze and cue movement patterns, analyze and modify body mechanics/ergonomics and assess and modify postural abnormalities to attain remaining goals. Progress toward goals / Updated goals:    Goal/Measure of Progress Goal Met? 1.  Pt to improve FOTO score to 63/100 indicating improved function in daily tasks    Status at last Eval: 50/100 Current Status: 38/100 (was 43/100 at last PN) no   2. Pt to improve AROM of the R shoulder:  Flexion 120 deg,   deg,  Functional ER to T2,  Functional IR to T12 to improve Q   Status at last Eval: n/a Current Status: R shoulder flex 72, abd 55, MICHELLE unable to perform, FIR MET at T8 Minimal progress     3.  Patient will demonstrate ability to lift a 3 pound weight to 80-90 degrees of shoulder flexion with good control and without pain in order to improve tolerance to household activities. 3# lift NOT MET: L: 145 deg, R: 42 deg       PLAN  [x]  Upgrade activities as tolerated yes Continue plan of care   []  Discharge due to :    []  Other:      Therapist: Kizzy Tavares PT    Date: 8/2/2022 Time: 3:12 PM     No future appointments.

## 2022-08-09 ENCOUNTER — HOSPITAL ENCOUNTER (OUTPATIENT)
Dept: PHYSICAL THERAPY | Age: 77
Discharge: HOME | End: 2022-08-09
Payer: MEDICARE

## 2022-08-09 PROCEDURE — 97162 PT EVAL MOD COMPLEX 30 MIN: CPT

## 2022-08-09 PROCEDURE — 97110 THERAPEUTIC EXERCISES: CPT

## 2022-08-09 PROCEDURE — 97535 SELF CARE MNGMENT TRAINING: CPT

## 2022-08-09 NOTE — PROGRESS NOTES
[address],[place], [address] - Phone: [phone]  Fax: [phone] OF [facility] / [address]  Patient Name: Burt Park : 1945   Medical   Diagnosis: R shoulder pain, OA of R shoulder, R RTC arthropathy  Treatment Diagnosis: Right shoulder pain [M25.511]   Onset Date:      Referral Source: Azeb Porter, * Start of Care [facility]): 2022   Prior Hospitalization: See medical history Provider #: [id number]   Prior Level of Function: Chronic and progressive difficulty with OH reaching and ADL's    Comorbidities: Arthritis, HTN, Scoliosis   Medications: Verified on Patient Summary List   The Plan of Care and following information is based on the information from the initial evaluation.   ===========================================================================================  Assessment / key information:  Patient is a 69year old female returning to PT with continued cc of R shoulder pain of insidious onset since  with progressive decrease in overhead mobility. Pt previously being treated for ~2 months in PT for similar complaints without significant change in function. Pt recently seen by Orthopedic surgeon and pt MD suggested returning to PT for conservative care due to no confirmation of RTC tear on imaging, only OA per pt report. Pt reports she received injection in her R shoulder 2 weeks ago which has helped with some pain. Pt states she is still undecided about surgical intervention however does not believe PT will solve her current R shoulder issues and wishes to continue discussing potential TSA for overall QOL and function. Pt states any shoulder movement at this time is aggravating, including driving and eases include rest. Pain rating at best: 2/10 with rest and at worst: 8/10 when trying to reach overhead.   Other significant PMHx includes having R sided breast cancer in 2007 and has scoliosis which she reports is a major contributor to her shoulder deficits due to her shoulder blade positioning. Pt signs and symptoms consistent with arthritic shoulder and RTC insufficieny affecting QOL. Pt presents with deficits such as pain, decreased AROM, decreased strength, and decreased functional mobility of the R shoulder. Patient may benefit from skilled intervention to address the above deficits, improve quality of life and facilitate maximum level of prior function. Pt agreeable to PT at this time and plans to follow up with orthopedic surgeon following conservative care. OBJECTIVE:   Objective Data: FOTO score = 39 (an established functional score where 100 = no disability). Posture/Positioning: Forward rounded shoulders with elevated shoulder to the R, lateral winging of the R scapula at rest;  noticeable scoliotic curvature     Standing Shoulder AROM:  Flexion: L: 165 deg,  R: 75 deg w/ p! With lowering  ABD: L: 160 deg,  R: 65 deg w/ pain  [email or web address] deg: L: NT,  R: 40 deg, 65 degrees with cane LEAHOM  [email or web address]: To the belly  Functional ER: R: occiput with compensations   Functional IR: T4     Shoulder PROM:  Flexion: L: NT deg,  R: 135 deg  ABD: L: NT deg,  R: 85 deg  ER, arm @ 0 R: 50 deg, L: NT. arm @ 80 deg ABD:  L: NT, R: 30 deg     Shoulder Strength:  Flexion: L: 4-/5, R: 4-/5  ABD: L:  4/5, R: 3/5  ER: L: 4/5 , R: 4-/5  IR: L: 4/5 , R: 4/5      Pt counseled on diagnosis, prognosis, PT findings and POC. Pt educated on HEP with review and demonstration with printed copy, advised pt to discontinue if symptoms worsen. HEP: Access Code: [email or web address]  URL: [email or web address]  Date: 06/03/2022 Prepared by: Gustavo Herrera PT     Exercises  Isometric Shoulder Flexion- 1 x daily - 7 x weekly - 1 sets - 10 reps - 3 hold  Isometric Shoulder Abduction - 1 x daily - 7 x weekly - 1 sets - 10 reps - 3 hold  Isometric Shoulder ER - 1 x daily - 7 x weekly - 1 sets - 10 reps - 3 hold  Isometric Shoulder IR- 1 x daily - 7 x weekly - 1 sets - 10 reps - 3 hold  Standing Shoulder Abduction - 1 x daily - 7 x weekly - 1 sets - 10 reps   ===========================================================================================  Eval Complexity: History MEDIUM  Complexity : 1-2 comorbidities / personal factors will impact the outcome/ POC ;  Examination  MEDIUM Complexity : 3 Standardized tests and measures addressing body structure, function, activity limitation and / or participation in recreation ; Presentation MEDIUM Complexity : Evolving with changing characteristics ; Decision Making MEDIUM Complexity : FOTO score of 26-74; Overall Complexity MEDIUM  Problem List: pain affecting function, decrease ROM, decrease strength, decrease ADL/ functional abilitiies, decrease activity tolerance, and decrease flexibility/ joint mobility   Treatment Plan may include any combination of the following: Therapeutic exercise, Therapeutic activities, Neuromuscular re-education, Physical agent/modality, Manual therapy, Patient education, Self Care training, and Functional mobility training  Patient / Family readiness to learn indicated by: asking questions, trying to perform skills, and interest  Persons(s) to be included in education: patient (P)  Barriers to Learning/Limitations: None  Measures taken, if barriers to learning:    Patient Goal (s): \"Pain relief and increase ROM\"   Patient self reported health status: good  Rehabilitation Potential: good  Short Term Goals: To be accomplished in  2  weeks:  Patient will be independent with HEP to improve self management of symptoms   Patient will report max pain 4/10 during ADL's for improved QOL   Long Term Goals:  To be accomplished in  6  weeks:  Patient will improve FOTO score by >/= 10 points for improved overall function   Patient will demonstrate R shoulder ROM Flexion to 120 deg for improved reaching and ADL's   Patient will demonstrate R shoulder ROM Abduction to 120 deg for improved reaching and ADL's   Frequency / Duration:   Patient to be seen  2  times per week for 6  weeks:  Patient / Caregiver education and instruction: self care, activity modification, and exercises  Therapist Signature: Thierry Talley PT Date: 2/8/1573   Certification Period: 08/09/2022-11/09/2022 Time: 2:30 PM   ===========================================================================================  I certify that the above Physical Therapy Services are being furnished while the patient is under my care. I agree with the treatment plan and certify that this therapy is necessary. Physician Signature:        Date:       Time:                                        Louis Humphreys, Eva  Please sign and return to [facility] at [facility], [facility]. or you may fax the signed copy to [phone]. Thank you. yes

## 2022-08-09 NOTE — PROGRESS NOTES
PHYSICAL THERAPY - DAILY TREATMENT NOTE    Patient Name: Jodi Narayan        Date: 2022  : 1945   yes Patient  Verified  Visit #:     Insurance: Payor: Vianca Members / Plan: [place] MEDICARE PART A & B / Product Type: Medicare /      In time: 4:20 Out time: 5:15   Total Treatment Time: 55     Medicare/BCBS Time Tracking (below)   Total Timed Codes (min):  30 1:1 Treatment Time:  55     TREATMENT AREA =  Right shoulder pain [M25.511]    SUBJECTIVE  Pain Level (on 0 to 10 scale):   10   Medication Changes/New allergies or changes in medical history, any new surgeries or procedures?    no  If yes, update Summary List   Subjective Functional Status/Changes:  []  No changes reported     See POC          OBJECTIVE    10 min Therapeutic Exercise:  [x]  See flow sheet   Rationale:      increase ROM and increase strength to improve the patients ability to perform ADL's      5 min Manual Therapy: STM to pec, subscap, UT   PROM (not billed)   Rationale:      decrease pain, increase ROM, and increase tissue extensibility to improve patient's ability to perform ADL's  The manual therapy interventions were performed at a separate and distinct time from the therapeutic activities interventions. 15 min Self Care: Pt educated on PT findings, diagnosis, prognosis and potential expectations of PT. Discussed potential benefits of surgical intervention following conservative care. HEP prescribed with review and demonstration.     Rationale:     Patient education  to improve the patients ability to self manage symptoms     Billed With/As:   [x] TE   [] TA   [] Neuro   [x] Self Care Patient Education: [x] Review HEP    [] Progressed/Changed HEP based on:   [] positioning   [] body mechanics   [] transfers   [] heat/ice application    [] other:      Other Objective/Functional Measures:    See POC   Post Treatment Pain Level (on 0 to 10) scale:   4  / 10     ASSESSMENT  Assessment/Changes in Function: See POC     []  See Progress Note/Recertification   Patient will continue to benefit from skilled PT services to modify and progress therapeutic interventions, address functional mobility deficits, address ROM deficits, address strength deficits, analyze and address soft tissue restrictions, analyze and cue movement patterns, analyze and modify body mechanics/ergonomics, and assess and modify postural abnormalities to attain remaining goals.    Progress toward goals / Updated goals:    See POC     PLAN  [x]  Upgrade activities as tolerated yes Continue plan of care   []  Discharge due to :    []  Other:      Therapist: Namita Good, PT    Date: 8/9/2022 Time: 2:32 PM     Future Appointments   Date Time Provider Lennie Rodriges   8/9/2022  4:15 PM Andreasota Mariellaer, [address] [facility]

## 2022-08-16 ENCOUNTER — HOSPITAL ENCOUNTER (OUTPATIENT)
Dept: PHYSICAL THERAPY | Age: 77
Discharge: HOME | End: 2022-08-16
Payer: MEDICARE

## 2022-08-16 PROCEDURE — 97110 THERAPEUTIC EXERCISES: CPT

## 2022-08-16 PROCEDURE — 97140 MANUAL THERAPY 1/> REGIONS: CPT

## 2022-08-16 NOTE — PROGRESS NOTES
PHYSICAL THERAPY - DAILY TREATMENT NOTE    Patient Name: Pili Armendariz        Date: 2022  : 1945   yes Patient  Verified  Visit #:      12  Insurance: Payor: Susan Velasco / Plan: [place] MEDICARE PART A & B / Product Type: Medicare /      In time: 330 Out time: 415   Total Treatment Time: 45     Medicare/BCBS Time Tracking (below)   Total Timed Codes (min):  45 1:1 Treatment Time:  45     TREATMENT AREA =  Right shoulder pain [M25.511]    SUBJECTIVE  Pain Level (on 0 to 10 scale):  0  / 10   Medication Changes/New allergies or changes in medical history, any new surgeries or procedures?    no  If yes, update Summary List   Subjective Functional Status/Changes:  []  No changes reported     Pt states her shoulder has not been hurting much since eval day. Driving was not bothersome and usually it is. Saw rheumatologist yesterday, appointment went well. OBJECTIVE    15 min Therapeutic Exercise:  [x]  See flow sheet   Rationale:      increase ROM, increase strength, and improve coordination to improve the patients ability to raise R UE to perform ADL's and functional tasks. 25 min Manual Therapy: PROM flexion, abduction, ER. STM to pec, subscap, UT. Relaxation techniques    Rationale:      decrease pain, increase ROM, increase tissue extensibility, and decrease trigger points to improve patient's ability to perform ADL's and reaching tasks   The manual therapy interventions were performed at a separate and distinct time from the therapeutic activities interventions. 5 min Self Care: Pt educated on potential soreness following intervention today, advised to utilize cold pack if needed. Educated to continue with current HEP.  (Not billed)    Rationale:     Patient education  to improve the patients ability to self manage symptom      Billed With/As:   [x] TE   [] TA   [] Neuro   [] Self Care Patient Education: [x] Review HEP    [] Progressed/Changed HEP based on:   [] positioning [] body mechanics   [] transfers   [] heat/ice application    [] other:      Other Objective/Functional Measures:    R shoulder PROM: achieved 135 deg flexion, 95 deg abd. Clunking and clicking remain present throughout ranges     Initiated and progress therapeutic intervention per flow sheet      Post Treatment Pain Level (on 0 to 10) scale:   0  / 10     ASSESSMENT  Assessment/Changes in Function:     Pt tolerated session today without adverse reactions. Pt challenged with all mobility and strengthening exercises today. Remains challenged with shoulder ROM in all planes with significant strength deficits and movement compensations to achieve ROM in available ranges. []  See Progress Note/Recertification   Patient will continue to benefit from skilled PT services to modify and progress therapeutic interventions, address functional mobility deficits, address ROM deficits, address strength deficits, analyze and address soft tissue restrictions, analyze and cue movement patterns, analyze and modify body mechanics/ergonomics, and assess and modify postural abnormalities to attain remaining goals.    Progress toward goals / Updated goals:    Initiated, first follow up visit      PLAN  [x]  Upgrade activities as tolerated yes Continue plan of care   []  Discharge due to :    []  Other:      Therapist: Ingris Barbosa PT    Date: 8/16/2022 Time: 3:13 PM     Future Appointments   Date Time Provider Lennie Rodriges   8/16/2022  3:30 PM Bernadine Mclain, PT [facility]   8/18/2022  2:45 PM Bernadine Mclain, PT [facility]   8/23/2022  2:45 PM Bernadine Mclain, PT [facility]   8/25/2022  5:15 PM Bernadine Mclain, PT [facility]   8/30/2022  3:45 PM Saúl Thomas, PT [facility]   9/1/2022  4:30 PM Saúl Thomas, PT [facility]   9/6/2022  4:30 PM Saúl Thomas, PT [facility]   9/8/2022  3:00 PM Saúl Thomas, PT [facility]   9/13/2022  3:30 PM Bernadine Mclain, PT [facility]   9/15/2022  3:30 PM Bernadine Mclain, PT [facility]   9/20/2022  3:30 PM Bernadine Mclain, PT [facility]   9/22/2022  3:30 PM Bernadine Mclain, [address] [facility]

## 2022-08-18 ENCOUNTER — HOSPITAL ENCOUNTER (OUTPATIENT)
Dept: PHYSICAL THERAPY | Age: 77
Discharge: HOME | End: 2022-08-18
Payer: MEDICARE

## 2022-08-18 PROCEDURE — 97140 MANUAL THERAPY 1/> REGIONS: CPT

## 2022-08-18 PROCEDURE — 97110 THERAPEUTIC EXERCISES: CPT

## 2022-08-18 NOTE — PROGRESS NOTES
PHYSICAL THERAPY - DAILY TREATMENT NOTE    Patient Name: Ignacio Georges        Date: 2022  : 1945   yes Patient  Verified  Visit #:   3   of   12  Insurance: Payor: Matthew Justice / Plan: [place] MEDICARE PART A & B / Product Type: Medicare /      In time: 250 Out time: 240   Total Treatment Time: 50     Medicare/BCBS Time Tracking (below)   Total Timed Codes (min):  40 1:1 Treatment Time:  50     TREATMENT AREA =  Right shoulder pain [M25.511]    SUBJECTIVE  Pain Level (on 0 to 10 scale):  2  / 10   Medication Changes/New allergies or changes in medical history, any new surgeries or procedures?    no  If yes, update Summary List   Subjective Functional Status/Changes:  []  No changes reported     Pt states she washed her hair and drove so her shoulder is a little aggravated.            OBJECTIVE  Modalities Rationale:     decrease inflammation and decrease pain to improve patient's ability to tolerate ADL's    min [] Estim, type/location:                                      []  att     []  unatt     []  w/US     []  w/ice    []  w/heat    min []  Mechanical Traction: type/lbs                   []  pro   []  sup   []  int   []  cont    []  before manual    []  after manual    min []  Ultrasound, settings/location:      min []  Iontophoresis w/ dexamethasone, location:                                               []  take home patch       []  in clinic   10 min [x]  Ice     []  Heat    location/position: R shoulder in incline position     min []  Vasopneumatic Device, press/temp:    If using vaso (only need to measure limb vaso being performed on)      pre-treatment girth :       post-treatment girth :       measured at (landmark location) :      min []  Other:    [x] Skin assessment post-treatment (if applicable):    [x]  intact    []  redness- no adverse reaction                  []redness - adverse reaction:      15 min Therapeutic Exercise:  [x]  See flow sheet   Rationale:      increase ROM, increase strength, and improve coordination to improve the patients ability to perform ADL's      25 min Manual Therapy: PROM flexion, abduction, ER. STM to pec, subscap, UT, posterior cuff. Relaxation techniques    Rationale:      decrease pain, increase ROM, increase tissue extensibility, decrease trigger points, and increase postural awareness to improve patient's ability to perform ADL's and reaching tasks   The manual therapy interventions were performed at a separate and distinct time from the therapeutic activities interventions. Billed With/As:   [x] TE   [] TA   [] Neuro   [] Self Care Patient Education: [x] Review HEP    [] Progressed/Changed HEP based on:   [] positioning   [] body mechanics   [] transfers   [] heat/ice application    [] other:      Other Objective/Functional Measures:    R shoulder PROM: achieved 135 deg flexion, 95 deg abd. Clunking and clicking remain present throughout ranges     R shoulder AROM: 85 deg flexion, 90 deg abduction     Increased TTP to infraspinatus today      Post Treatment Pain Level (on 0 to 10) scale:   1  / 10     ASSESSMENT  Assessment/Changes in Function:     Pt tolerated session today without adverse reactions. Pt challenged with all mobility and strengthening exercises today. Added pulleys to improve R shoulder flexion, pt required min A to avoid increase in pain. Remains challenged with shoulder ROM in all planes with significant strength deficits and movement compensations to achieve ROM in available ranges. []  See Progress Note/Recertification   Patient will continue to benefit from skilled PT services to modify and progress therapeutic interventions, address functional mobility deficits, address ROM deficits, address strength deficits, analyze and address soft tissue restrictions, analyze and cue movement patterns, analyze and modify body mechanics/ergonomics, and assess and modify postural abnormalities to attain remaining goals.    Progress toward goals / Updated goals:  Short Term Goals:  To be accomplished in  2  weeks:  Patient will be independent with HEP to improve self management of symptoms - Progressing, pt reports most complaint with initial HEP at this time (8/18/22)  Patient will report max pain 4/10 during ADL's for improved QOL - Progressing, Making slow progress at this time (8/18/22)     PLAN  [x]  Upgrade activities as tolerated yes Continue plan of care   []  Discharge due to :    []  Other:      Therapist: Eladio Man PT    Date: 8/18/2022 Time: 3:16 PM     Future Appointments   Date Time Provider Lennie Rodriges   8/23/2022  2:45 PM Nadia Stockton, PT [facility]   8/25/2022  5:15 PM Nadia Stockton, PT [facility]   8/30/2022  3:45 PM Oleeduardo Robledo, PT [facility]   9/1/2022  4:30 PM Oleta Holts, PT [facility]   9/6/2022  4:30 PM Oleta Holts, PT [facility]   9/8/2022  3:00 PM Oleta Venkat, PT [facility]   9/13/2022  3:30 PM Nadia Stockton, PT [facility]   9/15/2022  3:30 PM Nadia Stockton, PT [facility]   9/20/2022  3:30 PM Nadia Stockton, [facility]   9/22/2022  3:30 PM Nadia Stockton, [place] [facility]

## 2022-08-23 ENCOUNTER — HOSPITAL ENCOUNTER (OUTPATIENT)
Dept: PHYSICAL THERAPY | Age: 77
Discharge: HOME | End: 2022-08-23
Payer: MEDICARE

## 2022-08-23 PROCEDURE — 97110 THERAPEUTIC EXERCISES: CPT

## 2022-08-23 PROCEDURE — 97140 MANUAL THERAPY 1/> REGIONS: CPT

## 2022-08-23 NOTE — PROGRESS NOTES
PHYSICAL THERAPY - DAILY TREATMENT NOTE    Patient Name: Reed Campa        Date: 2022  : 1945   yes Patient  Verified  Visit #:     Insurance: Payor: Robinsonmarielenamaximino Schultzt / Plan: [place] MEDICARE PART A & B / Product Type: Medicare /      In time: 250 Out time: 340   Total Treatment Time: 50     Medicare/BCBS Time Tracking (below)   Total Timed Codes (min):  40 1:1 Treatment Time:  50     TREATMENT AREA =  Right shoulder pain [M25.511]    SUBJECTIVE  Pain Level (on 0 to 10 scale):  0  / 10   Medication Changes/New allergies or changes in medical history, any new surgeries or procedures?    no  If yes, update Summary List   Subjective Functional Status/Changes:  []  No changes reported     Pt reports her shoulder is doing well, only has some pain with driving. Still cant lift arm. Had other MD appointments who wishes to have additional tests done due to blood pressure and other heart concerns.         OBJECTIVE  Modalities Rationale:     decrease inflammation and decrease pain to improve patient's ability to improve patients ability to tolerate ADL's    min [] Estim, type/location:                                      []  att     []  unatt     []  w/US     []  w/ice    []  w/heat    min []  Mechanical Traction: type/lbs                   []  pro   []  sup   []  int   []  cont    []  before manual    []  after manual    min []  Ultrasound, settings/location:      min []  Iontophoresis w/ dexamethasone, location:                                               []  take home patch       []  in clinic   10 min [x]  Ice     []  Heat    location/position: R shoulder in incline position     min []  Vasopneumatic Device, press/temp:    If using vaso (only need to measure limb vaso being performed on)      pre-treatment girth :       post-treatment girth :       measured at (landmark location) :      min []  Other:    [x] Skin assessment post-treatment (if applicable):    [x]  intact    []  redness- no adverse reaction                  []redness - adverse reaction:      15 min Therapeutic Exercise:  [x]  See flow sheet   Rationale:      increase ROM, increase strength, and improve coordination to improve the patients ability to perform ADL's and reaching tasks      25 min Manual Therapy: PROM flexion, abduction, ER. STM to pec, subscap, UT, posterior cuff. Relaxation techniques    Rationale:      decrease pain, increase ROM, increase tissue extensibility, and increase postural awareness to improve patient's ability to perform ADL's and reaching tasks   The manual therapy interventions were performed at a separate and distinct time from the therapeutic activities interventions. Billed With/As:   [x] TE   [] TA   [] Neuro   [] Self Care Patient Education: [x] Review HEP    [] Progressed/Changed HEP based on:   [] positioning   [] body mechanics   [] transfers   [] heat/ice application    [] other:      Other Objective/Functional Measures:    Clunking and clicking remain present throughout ranges     Requires PT assist for eccentric lowering during pulley exercise     Tolerates side lying abduction with short lever with PT assist      Post Treatment Pain Level (on 0 to 10) scale:   0  / 10     ASSESSMENT  Assessment/Changes in Function:     Pt remains significantly challenged with R shoulder ROM in all planes and severe strength deficits. Eccentric lowering remains painful at this time, able to tolerate with moderate PT assist.      []  See Progress Note/Recertification   Patient will continue to benefit from skilled PT services to modify and progress therapeutic interventions, address functional mobility deficits, address ROM deficits, address strength deficits, analyze and address soft tissue restrictions, analyze and cue movement patterns, analyze and modify body mechanics/ergonomics, and assess and modify postural abnormalities to attain remaining goals.    Progress toward goals / Updated goals:    Short Term Goals:  To be accomplished in  2  weeks:  Patient will be independent with HEP to improve self management of symptoms - Goal Met (8/23/22)  Patient will report max pain 4/10 during ADL's for improved QOL - Progressing, Making slow progress at this time (8/18/22)     PLAN  [x]  Upgrade activities as tolerated yes Continue plan of care   []  Discharge due to :    []  Other:      Therapist: Moe Mcnulty PT    Date: 8/23/2022 Time: 2:50 PM     Future Appointments   Date Time Provider Lennie Rodriges   8/25/2022  5:15 PM Robin Nettles, PT [facility]   8/30/2022  3:45 PM Jessenia Laurent, [facility]   9/1/2022  4:30 PM Jessenia Laurent, PT [id number]   9/6/2022  4:30 PM Jessenia Laurent, [facility]   9/8/2022  3:00 PM Jessenia Laurent, PT [facility]   9/13/2022  3:30 PM Robin Nettles, [facility]   9/15/2022  3:30 PM Robin Yoon, [facility]   9/20/2022  3:30 PM Robin Nettles, [facility]   9/22/2022  3:30 PM Robin Nettles, [place] [facility]

## 2022-08-25 ENCOUNTER — HOSPITAL ENCOUNTER (OUTPATIENT)
Dept: PHYSICAL THERAPY | Age: 77
Discharge: HOME | End: 2022-08-25
Payer: MEDICARE

## 2022-08-25 PROCEDURE — 97140 MANUAL THERAPY 1/> REGIONS: CPT

## 2022-08-25 PROCEDURE — 97110 THERAPEUTIC EXERCISES: CPT

## 2022-08-25 NOTE — PROGRESS NOTES
PHYSICAL THERAPY - DAILY TREATMENT NOTE    Patient Name: Rosette Clemente        Date: 2022  : 1945   yes Patient  Verified  Visit #:     Insurance: Payor: Edgar Das / Plan: [place] MEDICARE PART A & B / Product Type: Medicare /      In time: 520 Out time: 610   Total Treatment Time: 50     Medicare/BCBS Time Tracking (below)   Total Timed Codes (min):  40 1:1 Treatment Time:  50     TREATMENT AREA =  Right shoulder pain [M25.511]    SUBJECTIVE  Pain Level (on 0 to 10 scale):  0  / 10   Medication Changes/New allergies or changes in medical history, any new surgeries or procedures?    no  If yes, update Summary List   Subjective Functional Status/Changes:  []  No changes reported     Pt reports her shoulder is behaving.  Feels she is making progress however still cannot reach         OBJECTIVE  Modalities Rationale:     decrease inflammation and decrease pain to improve patient's ability to tolerate ADL's and reaching tasks    min [] Estim, type/location:                                      []  att     []  unatt     []  w/US     []  w/ice    []  w/heat    min []  Mechanical Traction: type/lbs                   []  pro   []  sup   []  int   []  cont    []  before manual    []  after manual    min []  Ultrasound, settings/location:      min []  Iontophoresis w/ dexamethasone, location:                                               []  take home patch       []  in clinic   10 min [x]  Ice     []  Heat    location/position: R shoulder in incline position     min []  Vasopneumatic Device, press/temp:    If using vaso (only need to measure limb vaso being performed on)      pre-treatment girth :       post-treatment girth :       measured at (landmark location) :      min []  Other:    [x] Skin assessment post-treatment (if applicable):    [x]  intact    []  redness- no adverse reaction                  []redness - adverse reaction:      25 min Therapeutic Exercise:  [x]  See flow sheet Rationale:      increase ROM, increase strength, and improve coordination to improve the patients ability to perform ADL's and reaching tasks      15 min Manual Therapy: PROM flexion, abduction, ER. STM to pec, subscap, UT, posterior cuff. [address] mobs A/P various angles. Relaxation techniques    Rationale:      decrease pain, increase ROM, and increase tissue extensibility to improve patient's ability to perform ADL's and reaching tasks   The manual therapy interventions were performed at a separate and distinct time from the therapeutic activities interventions. Billed With/As:   [x] TE   [] TA   [] Neuro   [] Self Care Patient Education: [x] Review HEP    [] Progressed/Changed HEP based on:   [] positioning   [] body mechanics   [] transfers   [] heat/ice application    [] other:      Other Objective/Functional Measures:    Reduced clunking / clicking with A/P GH mobilizations with PROM flexion today     Added IR/ER resisted walkouts today for improved RTC strength, reduced clunking with cues for scapular engagement     Requires PT assist for eccentric lowering during pulley exercise      Post Treatment Pain Level (on 0 to 10) scale:   2  / 10     ASSESSMENT  Assessment/Changes in Function:     Pt remains significantly challenged with R shoulder ROM in all planes and severe strength deficits. Tolerated light increase in resistive exercises today. Will continue to progress as appropriate and able. []  See Progress Note/Recertification   Patient will continue to benefit from skilled PT services to modify and progress therapeutic interventions, address functional mobility deficits, address ROM deficits, address strength deficits, analyze and address soft tissue restrictions, analyze and cue movement patterns, analyze and modify body mechanics/ergonomics, and assess and modify postural abnormalities to attain remaining goals. Progress toward goals / Updated goals:    Short Term Goals:  To be accomplished in  2 weeks:  Patient will be independent with HEP to improve self management of symptoms - Goal Met (8/23/22)  Patient will report max pain 4/10 during ADL's for improved QOL - Progressing, Reports 0-5/10 pain levels at this time (8/25/22)     PLAN  [x]  Upgrade activities as tolerated yes Continue plan of care   []  Discharge due to :    []  Other:      Therapist: Vijaya Tong, PT    Date: 8/25/2022 Time: 5:16 PM     Future Appointments   Date Time Provider Lennie Rodriges   8/30/2022  3:45 PM George Escalante, PT [facility]   9/1/2022  4:30 PM [facility]   9/6/2022  4:30 PM George Escalante, PT [facility]   9/8/2022  3:00 PM George Escalante, PT [facility]   9/13/2022  3:30 PM Joaquin Proctor, PT [facility]   9/15/2022  3:30 PM Joaquin Proctor, PT [facility]   9/20/2022  3:30 PM Joaquin Proctor, PT [facility]   9/22/2022  3:30 PM Joaquin Proctor, [address] [facility]

## 2022-08-30 ENCOUNTER — HOSPITAL ENCOUNTER (OUTPATIENT)
Dept: PHYSICAL THERAPY | Age: 77
Discharge: HOME | End: 2022-08-30
Payer: MEDICARE

## 2022-08-30 PROCEDURE — 97140 MANUAL THERAPY 1/> REGIONS: CPT

## 2022-08-30 PROCEDURE — 97110 THERAPEUTIC EXERCISES: CPT

## 2022-08-30 NOTE — PROGRESS NOTES
PHYSICAL THERAPY - DAILY TREATMENT NOTE    Patient Name: Reed Campa        Date: 2022  : 1945   yes Patient  Verified  Visit #:      12  Insurance: Payor: Robinsonniall Zuhair / Plan: [place] MEDICARE PART A & B / Product Type: Medicare /      In time: 3:45 Out time: 4:35   Total Treatment Time: 50     Medicare/BCBS Time Tracking (below)   Total Timed Codes (min):  40 1:1 Treatment Time:  40     TREATMENT AREA =  Right shoulder pain [M25.511]    SUBJECTIVE  Pain Level (on 0 to 10 scale):  0  / 10   Medication Changes/New allergies or changes in medical history, any new surgeries or procedures?    no  If yes, update Summary List   Subjective Functional Status/Changes:  []  No changes reported     Rpoerts HTN medical history, some changes upcoming and a CTscan of the chest upcoming. Reports the shoulder is doing well.         OBJECTIVE  Modalities Rationale:     decrease inflammation and decrease pain to improve patient's ability to tolerate ADL's and reaching tasks    min [] Estim, type/location:                                      []  att     []  unatt     []  w/US     []  w/ice    []  w/heat    min []  Mechanical Traction: type/lbs                   []  pro   []  sup   []  int   []  cont    []  before manual    []  after manual    min []  Ultrasound, settings/location:      min []  Iontophoresis w/ dexamethasone, location:                                               []  take home patch       []  in clinic   10 min [x]  Ice     []  Heat    location/position: R shoulder in incline position     min []  Vasopneumatic Device, press/temp:    If using vaso (only need to measure limb vaso being performed on)      pre-treatment girth :       post-treatment girth :       measured at (landmark location) :      min []  Other:    [x] Skin assessment post-treatment (if applicable):    [x]  intact    []  redness- no adverse reaction                  []redness - adverse reaction:      25 min Therapeutic Exercise: [x]  See flow sheet   Rationale:      increase ROM, increase strength, and improve coordination to improve the patients ability to perform ADL's and reaching tasks      15 min Manual Therapy: PROM flexion, abduction, ER. isoms for abd, ext, er, ir. Rationale:      decrease pain, increase ROM, and increase tissue extensibility to improve patient's ability to perform ADL's and reaching tasks   The manual therapy interventions were performed at a separate and distinct time from the therapeutic activities interventions. Billed With/As:   [x] TE   [] TA   [] Neuro   [] Self Care Patient Education: [x] Review HEP    [] Progressed/Changed HEP based on:   [] positioning   [] body mechanics   [] transfers   [] heat/ice application    [] other:      Other Objective/Functional Measures:    Pt continues to move in a manner congruent with RTC tear with joint clicking and pain, more noted in eccentric lowering. Post Treatment Pain Level (on 0 to 10) scale:   0  / 10     ASSESSMENT  Assessment/Changes in Function:     Progressed reaching and lifting drills for anterior and lateral raises. AROM remains limited and painful.      []  See Progress Note/Recertification   Patient will continue to benefit from skilled PT services to modify and progress therapeutic interventions, address functional mobility deficits, address ROM deficits, address strength deficits, analyze and address soft tissue restrictions, analyze and cue movement patterns, analyze and modify body mechanics/ergonomics, and assess and modify postural abnormalities to attain remaining goals. Progress toward goals / Updated goals:    Short Term Goals:  To be accomplished in  2  weeks:  Patient will be independent with HEP to improve self management of symptoms - Goal Met (8/23/22)  Patient will report max pain 4/10 during ADL's for improved QOL - Progressing, Reports 0-5/10 pain levels at this time (8/25/22)     PLAN  [x]  Upgrade activities as tolerated yes Continue plan of care   []  Discharge due to :    []  Other:      Therapist: René Byrne, PT    Date: 8/30/2022 Time: 5:16 PM     Future Appointments   Date Time Provider Lennie Rodriges   8/30/2022  3:45 PM Anise Simpler, PT [facility]   9/1/2022  4:30 PM Anise Simpler, PT [facility]   9/6/2022  4:30 PM Anise Simpler, PT [facility]   9/8/2022  3:00 PM Anise Simpler, PT [facility]   9/13/2022  3:30 PM Devota Downer, PT [facility]   9/15/2022  3:30 PM Devota Downer, PT [facility]   9/20/2022  3:30 PM Devota Downer, PT [facility]   9/22/2022  3:30 PM Devota Downer, [address] [facility]

## 2022-09-01 ENCOUNTER — HOSPITAL ENCOUNTER (OUTPATIENT)
Dept: PHYSICAL THERAPY | Age: 77
Discharge: HOME | End: 2022-09-01
Payer: MEDICARE

## 2022-09-01 PROCEDURE — 97110 THERAPEUTIC EXERCISES: CPT

## 2022-09-01 PROCEDURE — 97140 MANUAL THERAPY 1/> REGIONS: CPT

## 2022-09-01 NOTE — PROGRESS NOTES
PHYSICAL THERAPY - DAILY TREATMENT NOTE    Patient Name: Rosa Tirado        Date: 2022  : 1945   yes Patient  Verified  Visit #:     Insurance: Payor: [employer] / Plan: [place] MED[place] PART [place] & B / Product Type: Medicare /      In time: 4:25 Out time: 5:15   Total Treatment Time: 50     Medicare/BCBS Time Tracking (below)   Total Timed Codes (min):  40 1:1 Treatment Time:  40     TREATMENT AREA =  Right shoulder pain [M25.511]    SUBJECTIVE  Pain Level (on 0 to 10 scale): \"Sore\"  / 10   Medication Changes/New allergies or changes in medical history, any new surgeries or procedures?    no  If yes, update Summary List   Subjective Functional Status/Changes:  []  No changes reported     Says the shoulder gets sore driving here. Says she had CT scan of the neck and chest and she thinks it's fine, she still doesn't know why the shoulders are puffy.        OBJECTIVE  Modalities Rationale:     decrease inflammation and decrease pain to improve patient's ability to tolerate ADL's and reaching tasks    min [] Estim, type/location:                                      []  att     []  unatt     []  w/US     []  w/ice    []  w/heat    min []  Mechanical Traction: type/lbs                   []  pro   []  sup   []  int   []  cont    []  before manual    []  after manual    min []  Ultrasound, settings/location:      min []  Iontophoresis w/ dexamethasone, location:                                               []  take home patch       []  in clinic   10 min [x]  Ice     []  Heat    location/position: R shoulder in incline position     min []  Vasopneumatic Device, press/temp:    If using vaso (only need to measure limb vaso being performed on)      pre-treatment girth :       post-treatment girth :       measured at (landmark location) :      min []  Other:    [x] Skin assessment post-treatment (if applicable):    [x]  intact    []  redness- no adverse reaction                  []redness - adverse reaction:      25 min Therapeutic Exercise:  [x]  See flow sheet   Rationale:      increase ROM, increase strength, and improve coordination to improve the patients ability to perform ADL's and reaching tasks      15 min Manual Therapy: PROM flexion, abduction, ER. isoms for abd, ext, er, ir. Rationale:      decrease pain, increase ROM, and increase tissue extensibility to improve patient's ability to perform ADL's and reaching tasks   The manual therapy interventions were performed at a separate and distinct time from the therapeutic activities interventions. Billed With/As:   [x] TE   [] TA   [] Neuro   [] Self Care Patient Education: [x] Review HEP    [] Progressed/Changed HEP based on:   [] positioning   [] body mechanics   [] transfers   [] heat/ice application    [] other:      Other Objective/Functional Measures:    Pt continues to move in a manner congruent with RTC tear with joint clicking and pain, more noted in eccentric lowering. Post Treatment Pain Level (on 0 to 10) scale:   0  / 10     ASSESSMENT  Assessment/Changes in Function:     Shoulder remains dysfunctional, poor AROM, poor strength, GH posiiton poor, joint clicking/sliding. []  See Progress Note/Recertification   Patient will continue to benefit from skilled PT services to modify and progress therapeutic interventions, address functional mobility deficits, address ROM deficits, address strength deficits, analyze and address soft tissue restrictions, analyze and cue movement patterns, analyze and modify body mechanics/ergonomics, and assess and modify postural abnormalities to attain remaining goals. Progress toward goals / Updated goals:    Short Term Goals:  To be accomplished in  2  weeks:  Patient will be independent with HEP to improve self management of symptoms - Goal Met (8/23/22)  Patient will report max pain 4/10 during ADL's for improved QOL - Progressing, Reports 0-5/10 pain levels at this time (8/25/22) PLAN  [x]  Upgrade activities as tolerated yes Continue plan of care   []  Discharge due to :    []  Other:      Therapist: Milagros Khan PT    Date: 9/1/2022 Time: 5:16 PM     Future Appointments   Date Time Provider Lennie Rodriges   9/1/2022  4:30 PM Jamin Ryan, PT [facility]   9/6/2022  4:30 PM Jamin Ryan, PT [facility]   9/8/2022  3:00 PM Jamin Connor, PT [facility]   9/13/2022  3:30 PM Davina [place], PT [facility]   9/15/2022  3:30 PM Davina [place], PT [facility]   9/20/2022  3:30 PM Davina [place], PT [facility]   9/22/2022  3:30 PM Davina [place], [address]  [facility]

## 2022-09-06 ENCOUNTER — HOSPITAL ENCOUNTER (OUTPATIENT)
Dept: PHYSICAL THERAPY | Age: 77
Discharge: HOME | End: 2022-09-06
Payer: MEDICARE

## 2022-09-06 PROCEDURE — 97140 MANUAL THERAPY 1/> REGIONS: CPT

## 2022-09-06 PROCEDURE — 97110 THERAPEUTIC EXERCISES: CPT

## 2022-09-06 NOTE — PROGRESS NOTES
PHYSICAL THERAPY - DAILY TREATMENT NOTE    Patient Name: Fletcher Ross        Date: 2022  : 1945   yes Patient  Verified  Visit #:     Insurance: Payor: Tamara [employer]ahli / Plan: [place] MEDICARE PART A & B / Product Type: Medicare /      In time: 4:32 Out time: 5:15   Total Treatment Time: 43     Medicare/BCBS Time Tracking (below)   Total Timed Codes (min):  43 1:1 Treatment Time:  43     TREATMENT AREA =  Right shoulder pain [M25.511]    SUBJECTIVE  Pain Level (on 0 to 10 scale):  0 / 10   Medication Changes/New allergies or changes in medical history, any new surgeries or procedures?    no  If yes, update Summary List   Subjective Functional Status/Changes:  []  No changes reported     No pain as long as she doesn't use it. OBJECTIVE  25 min Therapeutic Exercise:  [x]  See flow sheet   Rationale:      increase ROM, increase strength, and improve coordination to improve the patients ability to perform ADL's and reaching tasks      20 min Manual Therapy: PROM flexion, abduction, ER. isoms for abd, ext, er, ir. Rationale:      decrease pain, increase ROM, and increase tissue extensibility to improve patient's ability to perform ADL's and reaching tasks   The manual therapy interventions were performed at a separate and distinct time from the therapeutic activities interventions. Billed With/As:   [x] TE   [] TA   [] Neuro   [] Self Care Patient Education: [x] Review HEP    [] Progressed/Changed HEP based on:   [] positioning   [] body mechanics   [] transfers   [] heat/ice application    [] other:      Other Objective/Functional Measures:    Pt continues to move in a manner congruent with RTC tear and severe GH OA with joint clicking and pain, more noted in eccentric lowering.       Post Treatment Pain Level (on 0 to 10) scale:   0  / 10     ASSESSMENT  Assessment/Changes in Function:     Shoulder remains dysfunctional, poor AROM, poor strength, GH posiiton poor, joint clicking/sliding. Will PN NV.     []  See Progress Note/Recertification   Patient will continue to benefit from skilled PT services to modify and progress therapeutic interventions, address functional mobility deficits, address ROM deficits, address strength deficits, analyze and address soft tissue restrictions, analyze and cue movement patterns, analyze and modify body mechanics/ergonomics, and assess and modify postural abnormalities to attain remaining goals. Progress toward goals / Updated goals:    Short Term Goals:  To be accomplished in  2  weeks:  Patient will be independent with HEP to improve self management of symptoms - Goal Met (8/23/22)  Patient will report max pain 4/10 during ADL's for improved QOL - Progressing, Reports 0-5/10 pain levels at this time (8/25/22)     PLAN  [x]  Upgrade activities as tolerated yes Continue plan of care   []  Discharge due to :    []  Other:      Therapist: Carlos Mendoza PT    Date: 9/6/2022 Time: 5:16 PM     Future Appointments   Date Time Provider Lennie Rodriges   9/8/2022  3:00 PM Ok Asp, PT [facility]   9/13/2022  3:30 PM Alexa Sacha, PT [facility]   9/15/2022  3:30 PM Hasmukh Goncalves, PT [facility]   9/20/2022  3:30 PM Hasmukh Goncalves, PT [facility]   9/22/2022  3:30 PM Hasmukh Goncalves, [address]  [facility]

## 2022-09-08 ENCOUNTER — HOSPITAL ENCOUNTER (OUTPATIENT)
Dept: PHYSICAL THERAPY | Age: 77
Discharge: HOME | End: 2022-09-08
Payer: MEDICARE

## 2022-09-08 PROCEDURE — 97535 SELF CARE MNGMENT TRAINING: CPT

## 2022-09-08 NOTE — PROGRESS NOTES
[address] Lisette Maddox [address], [facility] - Phone: [phone]  Fax: [phone]  DISCHARGE SUMMARY FOR PHYSICAL THERAPY          Patient Name: Nina Brittle : 1945   Treatment/Medical Diagnosis: Right shoulder pain [M25.511]   Onset Date:     Referral Source: Angi Hobbs, * Start of Care [facility]): 22   Prior Hospitalization: See Medical History Provider #: [id number]   Prior Level of Function: Chronic and progressive difficulty with OH reaching and ADL's    Comorbidities: Arthritis, HTN, Scoliosis   Medications: Verified on Patient Summary List   Visits from [facility]: 9 Missed Visits: 0     GOALS:  Short Term Goals: To be accomplished in  2  weeks:  Patient will be independent with HEP to improve self management of symptoms - Goal Met (22)  Patient will report max pain 4/10 during ADL's for improved QOL - Progressing, Reports 0-5/10 pain levels at this time (22)  Long Term Goals: To be accomplished in  6  weeks:  Patient will improve FOTO score by >/= 10 points for improved overall function Status: 45/100 (22) (was: 35/100 at [facility])  Patient will demonstrate R shoulder ROM Flexion to 120 deg for improved reaching and ADL's NO CHANGE: 70 deg, 4/10 P! Patient will demonstrate R shoulder ROM Abduction to 120 deg for improved reaching and ADL's NO CHANGE 65 deg, 4/10 P! Key Functional Changes/Progress: Shoulder remains dysfunctional, poor AROM, poor strength, GH posiiton poor, joint clicking/sliding. As this is patient's second round of skilled physical therapist delivered intervention, I can say pt has met max benefit from PT at this time and recommend pt follow-up with referring physician. Pt indicates a \"0\" on the GROC indicating \"same\" as when she started PT. Based on patient progress and goal status patient is appropriate to DC from this episode of care at this time.  DC planning and continuation of HEP was discussed, pt instructed, all questions answered. Pt continues to move in a manner congruent with RTC tear and severe GH OA with joint clicking and pain, more noted in eccentric lowering. Standing Shoulder AROM:  Flexion: L: 165 deg,  R: 70 deg w/ p! With lowering  ABD: L: 160 deg,  R: 65 deg w/ pain  Functional ER: R: occiput with compensations, P! Functional IR: T4    Assessments/Recommendations: Discontinue therapy due to lack of appreciable progress towards goals. If you have any questions/comments please contact us directly at [phone]. Thank you for allowing us to assist in the care of your patient. Therapist Signature: Patti Tracy PT Date: 9/8/22   Reporting Period: 8/9/22 - 9/8/22 Time: 3:23 PM   NOTE TO PHYSICIAN:  Your patient's insurance requires this discharge note be signed and returned. PLEASE COMPLETE THE ORDERS BELOW AND RETURN TO:  [facility] PHYSICAL THERAPY     ___ I have read the above report and request that my patient be discharged from therapy.       Physician Signature:                                                               Date:                                        Time:                        Angi Hobbs, *

## 2022-09-08 NOTE — PROGRESS NOTES
PHYSICAL THERAPY - DAILY TREATMENT NOTE    Patient Name: Joanne Mondragon        Date: 2022  : 1945   yes Patient  Verified  Visit #:     Insurance: Payor: [employer] / Plan: [place] MEDICARE PART A & B / Product Type: Medicare /      In time: 300 Out time: 320   Total Treatment Time: 20     Medicare/BCBS Time Tracking (below)   Total Timed Codes (min):  20 1:1 Treatment Time:  20     TREATMENT AREA =  Right shoulder pain [M25.511]    SUBJECTIVE  Pain Level (on 0 to 10 scale):  0 / 10   Medication Changes/New allergies or changes in medical history, any new surgeries or procedures?    no  If yes, update Summary List   Subjective Functional Status/Changes:  []  No changes reported     No pain as long as she doesn't use it. But she can't use it for anything. OBJECTIVE  20 min Self Care: Activity modification, Pt Ed   Rationale:    improve coordination to improve the patients ability to make gains post session    Billed With/As:   [x] TE   [] TA   [] Neuro   [] Self Care Patient Education: [x] Review HEP    [] Progressed/Changed HEP based on:   [] positioning   [] body mechanics   [] transfers   [] heat/ice application    [] other:      Other Objective/Functional Measures:    Pt continues to move in a manner congruent with RTC tear and severe GH OA with joint clicking and pain, more noted in eccentric lowering. Standing Shoulder AROM:  Flexion: L: 165 deg,  R: 70 deg w/ p! With lowering  ABD: L: 160 deg,  R: 65 deg w/ pain  Functional ER: R: occiput with compensations, P! Functional IR: T4     Post Treatment Pain Level (on 0 to 10) scale:   0  / 10     ASSESSMENT  Assessment/Changes in Function:     Shoulder remains dysfunctional, poor AROM, poor strength, GH posiiton poor, joint clicking/sliding.  As this is patient's second round of skilled physical therapist delivered intervention, I can say pt has met max benefit from PT at this time and recommend pt follow-up with referring physician. Pt indicates a \"0\" on the GROC indicating \"same\" as when she started PT.     []  See Progress Note/Recertification   Patient will continue to benefit from skilled PT services to modify and progress therapeutic interventions, address functional mobility deficits, address ROM deficits, address strength deficits, analyze and address soft tissue restrictions, analyze and cue movement patterns, analyze and modify body mechanics/ergonomics, and assess and modify postural abnormalities to attain remaining goals. Progress toward goals / Updated goals:    Short Term Goals: To be accomplished in  2  weeks:  Patient will be independent with HEP to improve self management of symptoms - Goal Met (8/23/22)  Patient will report max pain 4/10 during ADL's for improved QOL - Progressing, Reports 0-5/10 pain levels at this time (9/6/22)  Long Term Goals: To be accomplished in  6  weeks:  Patient will improve FOTO score by >/= 10 points for improved overall function Status: 45/100 (9/8/22) (was: 35/100 at [address])  Patient will demonstrate R shoulder ROM Flexion to 120 deg for improved reaching and ADL's NO CHANGE: 70 deg, 4/10 P! Patient will demonstrate R shoulder ROM Abduction to 120 deg for improved reaching and ADL's NO CHANGE 65 deg, 4/10 P!      PLAN  [x]  Upgrade activities as tolerated yes Continue plan of care   []  Discharge due to :    []  Other:      Therapist: Chao Cabrera PT    Date: 9/8/2022 Time: 5:16 PM     Future Appointments   Date Time Provider Lennie Rodriges   9/8/2022  3:00 PM Tony Person, PT [facility] [address]   9/13/2022  3:30 PM Alric Gelineau, PT [facility] [address]   9/15/2022  3:30 PM Alric Gelineau, PT [facility] [address]   9/20/2022  3:30 PM Alric Gelineau, PT [facility] [address]   9/22/2022  3:30 PM Alric Gelineau, [address] [place] [address] [address]

## 2022-09-13 ENCOUNTER — APPOINTMENT (OUTPATIENT)
Dept: PHYSICAL THERAPY | Age: 77
End: 2022-09-13
Payer: MEDICARE

## 2022-09-15 ENCOUNTER — APPOINTMENT (OUTPATIENT)
Dept: PHYSICAL THERAPY | Age: 77
End: 2022-09-15
Payer: MEDICARE

## 2022-09-20 ENCOUNTER — APPOINTMENT (OUTPATIENT)
Dept: PHYSICAL THERAPY | Age: 77
End: 2022-09-20
Payer: MEDICARE

## 2022-09-22 ENCOUNTER — APPOINTMENT (OUTPATIENT)
Dept: PHYSICAL THERAPY | Age: 77
End: 2022-09-22
Payer: MEDICARE

## 2023-02-23 ENCOUNTER — HOSPITAL ENCOUNTER (OUTPATIENT)
Facility: HOSPITAL | Age: 78
Setting detail: RECURRING SERIES
Discharge: HOME | End: 2023-02-26
Payer: MEDICARE

## 2023-02-23 PROCEDURE — 97140 MANUAL THERAPY 1/> REGIONS: CPT

## 2023-02-23 PROCEDURE — 97162 PT EVAL MOD COMPLEX 30 MIN: CPT

## 2023-02-23 PROCEDURE — 97535 SELF CARE MNGMENT TRAINING: CPT

## 2023-02-23 NOTE — PROGRESS NOTES
PHYSICAL THERAPY - DAILY TREATMENT NOTE    Patient Name: Elizabeth Signs    Date: 2023    : 1945  Insurance: Payor: MEDICARE / Plan: MEDICARE PART A AND B / Product Type: *No Product type* /      Patient  verified YES   Visit #   Current / Total 1 12-18   Time   In / Out 1:00 2:00   Pain   In / Out 0 0   Subjective Functional Status/Changes: SEE EVALUATION   Changes to:  Meds, Allergies, Med Hx, Sx Hx? If yes, update Summary List NO *see note for subj/obj changes prn*      TREATMENT AREA =  Pain in right shoulder [M25.511]    OBJECTIVE        Therapeutic Procedures: Tx Min Billable or 1:1 Min (if diff from Tx Min) Procedure, Rationale, Specifics   10  14113 Self Care/Home Management (timed):  improve patient knowledge and understanding of pain reducing techniques, positioning, posture/ergonomics, activity modification, and sling positoning and protocol advice/troubleshooting  to improve patient's ability to progress to PLOF and address remaining functional goals. (see flow sheet as applicable)     Details if applicable:       20  23238 Manual Therapy (timed):  increase ROM and increase tissue extensibility to improve patient's ability to progress to PLOF and address remaining functional goals. The manual therapy interventions were performed at a separate and distinct time from the therapeutic activities interventions .  (see flow sheet as applicable)     Details if applicable:  PROM ABD, flex, ER, IR          Details if applicable:            Details if applicable:            Details if applicable:     27 60 [facility] Totals Reminder: bill using total billable min of TIMED therapeutic procedures (example: do not include dry needle or estim unattended, both untimed codes, in totals to left)  8-22 min = 1 unit; 23-37 min = 2 units; 38-52 min = 3 units; 53-67 min = 4 units; 68-82 min = 5 units   Total Total     [x]  Patient Education billed concurrently with other procedures   [x] Review HEP    [] Progressed/Changed HEP, detail:    [] Other detail:       Objective Information/Functional Measures/Assessment    SEE EVALUATION    Patient will benefit from skilled PT services to address functional deficits and attain remaining goals as set in EVALUATION    Progress toward goals / Updated goals:  []  See Progress Note/Recertification    SEE EVAL    PLAN  YES Continue plan of care  [x]  Upgrade activities as tolerated  []  Discharge due to :  []  Other:    Hossein Dubose, PT DPT, OCS   2/23/2023    10:24 AM    Future Appointments   Date Time Provider Tigist Ortega   2/23/2023  1:00 PM [facility]

## 2023-02-23 NOTE — THERAPY EVALUATION
[address] [place] [address] DE:[phone] Fx: [phone]  Plan of Care / Statement of Necessity for Physical Therapy Services     Patient Name: Rocio Muse : 1945   Medical   Diagnosis: Pain in right shoulder [M25.511] Treatment Diagnosis: Pain in right shoulder [M25.511]   Onset Date: R rTSA DOS 23     Referral Source: Rita [place]* Start of Care [facility]): 2023   Prior Hospitalization: See medical history Provider #: [id number]   Prior Level of Function: Pain and dysfunction of the right shoulder. Comorbidities: Arthritis, HTN, Scoliosis, prior CA, chronic back pain   Medications: Verified on Patient Summary List   Subjective: Pt is RHD, having rTSA 2 weeks ago. She will be in the sling 2 more weeks. She has no pain, she is sleeping through the night with Neurontin and flexeril. No other pain meds. Says the left shoulder is hurting from wearing the sling and this is her major problem right now. Says she is still getting injection in her low back and taking anti-inflammatories for this. Wishes to get back to driving, Reach up in cabinets. Assessment / key information:  Pt presents to [facility] at [facility], [facility]. with signs and symptoms congruent with a diagnosis of RIGHT reverse TSA (23) with limited PROM and AROM and disuse of the right shoulder. This patient is known to me prior to surgery for prior frozen shoulder and RTC rehab of this same shoulder. Pt has scoliotic changes of the thoracic spine and ribs that affect scapular positioning which in turn affect elevation range prognosis. Pt is RHD. Patient would benefit from skilled intervention to address deficits, improve quality of life and return patient to maximum level of available prior function. OBJECTIVE shoulder:   Posture/Positioning: Right scapula elevated, in sling w ABD pillow.   Cervical Screen: Limited to left SB and R rot, yet \"feels good\"    Standing Shoulder AROM:  Flexion: L: 140 deg,  R: NT deg  ABD: L: 110 deg,  R: NT deg  Functional ER: L: T2 P!,  R: NT  Functional IR: L: T10,  R: NT    Shoulder PROM:  Flexion:  R: 110 deg  ABD:   R: 67 deg  ER, arm at 80 deg ABD:  R: 17 deg  IR, arm at 80 deg ABD: R: 80 deg    HEP: Continue with post surgical HEP at this time. Re-adjusted sling for better fit. Evaluation Complexity:  History:  HIGH Complexity :3+ comorbidities / personal factors will impact the outcome/ POC ; Examination:  MEDIUM Complexity : 3 Standardized tests and measures addressin body structure, function, activity limitation and / or participation in recreation  ;Presentation:  MEDIUM Complexity : Evolving with changing characteristics  ; Clinical Decision Making:  HIGH Complexity : FOTO score of 1- 25  FOTO score = an established functional score where 100 = no disability  Overall Complexity Rating: MEDIUM  Problem List: pain affecting function, decrease ROM, decrease strength, and decrease ADL/functional abilities   Treatment Plan may include any combination of the followin Therapeutic Exercise, 25779 Neuromuscular Re-Education, 65519 Manual Therapy, 63709 Therapeutic Activity, 49939 Self Care/Home Management, 40341 Electrical Stim unattended, 85805 Electrical Stim attended, 92797 Vasopneumatic Device, 02727 Aquatic Therapy, 47366 Gait Training, [id number] Ultrasound, [id number] Mechanical Traction, [id number] Canalith Repositioning, [id number] Orthotic Management and Training, and Other:   Patient / Family readiness to learn indicated by: asking questions, interest, and return verbalization   Persons(s) to be included in education: patient (P)  Barriers to Learning/Limitations: none  Measures taken if barriers to learning present: -  Patient Goal (s): \"use my shoulder\"  Patient Self Reported Health Status: good  Rehabilitation Potential: good    Short Term Goals:  To be accomplished in 3 weeks  Pt to report adherence and demonstrate compliance with basic HEP to allow progress between visits  Status at last Eval: Initiated  Current Status: -  Goal Met?  -  2. Pt to report pain <3/10 at worst to allow improved function and QOL  Status at last Eval: 7/10 at worst  Current Status: -  Goal Met?  -  3. Pt to demonstrate RIGHT shoulder flexion PROM to 120 deg, ABD to 85 deg without increase in pain towards improved reach and ADL. Status at last Eval: 110 deg Flex, 67 deg ABD  Current Status: -  Goal Met?  -    Long Term Goals: To be accomplished in 6 weeks  Pt to improve FOTO score to 56/100 indicating improved function in daily tasks  Status at last Eval: 25/100  Current Status: -  Goal Met?  -  2. Pt to indicate a Global Rating Of Change (GROC) of 4+ indicating \"moderately better\"  Status at last Eval: n/a  Current Status: -  Goal Met?  -  3. Pt to improve AROM of the RIGHT shoulder: Flexion 100 deg, ABD 80 deg, Functional ER to ipsilateral occiput, Functional IR to L2 to improve QOL  Status at last Eval: NT  Current Status: -  Goal Met? -  4. Pt to demonstrate deltoid MMT to 4/5 in flex, abd, ext towards improved home chore completion  Status at last Eval: NT  Current Status: -  Goal Met?  -    Frequency / Duration: Patient to be seen 2-3 times per week for 6 weeks    Patient/ Caregiver education and instruction: Diagnosis, prognosis,  self care, activity modification, and exercises  [x]  Plan of care has been reviewed with PTA, prn.    Certification Period: 2/23/23 - 5/24/23    Slime Poe, PT DPT, OCS      2/23/2023       10:25 AM  ===================================================================  I certify that the above Therapy Services are being furnished while the patient is under my care. I agree with the treatment plan and certify that this therapy is necessary.     [de-identified] Signature:_________________________   DATE:_________   TIME:________                           Salma Casillas*    ** Signature, Date and Time must be completed for valid certification **  Please sign and fax to [facility] [phone].   Thank you

## 2023-02-27 ENCOUNTER — HOSPITAL ENCOUNTER (OUTPATIENT)
Facility: HOSPITAL | Age: 78
Setting detail: RECURRING SERIES
Discharge: HOME | End: 2023-03-02
Payer: MEDICARE

## 2023-02-27 PROCEDURE — 97140 MANUAL THERAPY 1/> REGIONS: CPT

## 2023-02-27 PROCEDURE — 97112 NEUROMUSCULAR REEDUCATION: CPT

## 2023-02-27 NOTE — PROGRESS NOTES
PHYSICAL THERAPY - DAILY TREATMENT NOTE    Patient Name: Deonte Fernando    Date: 2023    : 1945  Insurance: Payor: MEDICARE / Plan: MEDICARE PART A AND B / Product Type: *No Product type* /      Patient  verified YES   Visit #   Current / Total 2 12-18   Time   In / Out 1:00 2:00   Pain   In / Out 0 0   Subjective Functional Status/Changes: Patient reports she does not have pain. Says that her L shoulder hurts more than R. Changes to:  Meds, Allergies, Med Hx, Sx Hx? If yes, update Summary List NO     TREATMENT AREA =  Pain in right shoulder [M25.511]    OBJECTIVE        Therapeutic Procedures: Tx Min Billable or 1:1 Min (if diff from Tx Min) Procedure, Rationale, Specifics     60932 Self Care/Home Management (timed):  improve patient knowledge and understanding of pain reducing techniques, positioning, posture/ergonomics, activity modification, and sling positoning and protocol advice/troubleshooting  to improve patient's ability to progress to PLOF and address remaining functional goals. (see flow sheet as applicable)     Details if applicable:       20  17973 Manual Therapy (timed):  increase ROM and increase tissue extensibility to improve patient's ability to progress to PLOF and address remaining functional goals. The manual therapy interventions were performed at a separate and distinct time from the therapeutic activities interventions . (see flow sheet as applicable)     Details if applicable:  PROM ABD, flex, ER, IR. STM: R UT, Rotator cuff muscles, biceps   10     77891 Neuromuscular Re-Education (timed):  improve balance, coordination, kinesthetic sense, posture, core stability and proprioception to improve patient's ability to develop conscious control of individual muscles and awareness of position of extremities in order to progress to PLOF and address remaining functional goals.  (see flow sheet as applicable)    Details if applicable:            Details if applicable:     Details if applicable:     30  [facility] Totals Reminder: bill using total billable min of TIMED therapeutic procedures (example: do not include dry needle or estim unattended, both untimed codes, in totals to left)  8-22 min = 1 unit; 23-37 min = 2 units; 38-52 min = 3 units; 53-67 min = 4 units; 68-82 min = 5 units   Total Total     [x]  Patient Education billed concurrently with other procedures   [x] Review HEP    [] Progressed/Changed HEP, detail:    [] Other detail:       Objective Information/Functional Measures/Assessment  Chart reviewed and subjective taken. Pt requires cues and instruction for all drills, form, therapeutic focus, and carryover. Followed up on HEP plan to ensure compliance.     Progress toward goals / Updated goals:  []  See Progress Note/Recertification    First follow-up since IE.     PLAN  YES Continue plan of care  [x]  Upgrade activities as tolerated  []  Discharge due to :  []  Other:    LAMAR CERDA PTA DPT, OCS   2/27/2023    2:56 PM    Future Appointments   Date Time Provider Department Center   2/27/2023  3:00 PM Lamar Cerda, PTA [facility]   3/1/2023  1:30 PM Lamar Cerda, PTA [facility]   3/6/2023  3:00 PM Lamar Cerda, PTA [facility]   3/9/2023  2:30 PM Aniket Romero, PT [facility]   3/13/2023  1:30 PM Aniket Romero, PT [facility]   3/15/2023  1:30 PM Lamar Cerda, PTA [facility]   3/20/2023  2:00 PM Aniket Romero, PT [facility]   3/23/2023  4:00 PM Aniket Romero, PT [facility]   3/27/2023  3:00 PM Lamar Cerda, PTA [facility]

## 2023-03-01 ENCOUNTER — HOSPITAL ENCOUNTER (OUTPATIENT)
Facility: HOSPITAL | Age: 78
Setting detail: RECURRING SERIES
Discharge: HOME | End: 2023-03-04
Payer: MEDICARE

## 2023-03-01 PROCEDURE — 97140 MANUAL THERAPY 1/> REGIONS: CPT

## 2023-03-01 NOTE — PROGRESS NOTES
PHYSICAL THERAPY - DAILY TREATMENT NOTE    Patient Name: Mic Zelaya    Date: 3/1/2023    : 1945  Insurance: Payor: MEDICARE / Plan: MEDICARE PART A AND B / Product Type: *No Product type* /      Patient  verified YES   Visit #   Current / Total 3 12-18   Time   In / Out 1:00 2:00   Pain   In / Out 0 0   Subjective Functional Status/Changes: Patient reports her L shoulder is really bothering her - says that she is going to call the MD.   Changes to:  Meds, Allergies, Med Hx, Sx Hx? If yes, update Summary List NO     TREATMENT AREA =  Pain in right shoulder [M25.511]    OBJECTIVE        Therapeutic Procedures: Tx Min Billable or 1:1 Min (if diff from Tx Min) Procedure, Rationale, Specifics     32453 Self Care/Home Management (timed):  improve patient knowledge and understanding of pain reducing techniques, positioning, posture/ergonomics, activity modification, and sling positoning and protocol advice/troubleshooting  to improve patient's ability to progress to PLOF and address remaining functional goals. (see flow sheet as applicable)     Details if applicable:       30  37184 Manual Therapy (timed):  increase ROM and increase tissue extensibility to improve patient's ability to progress to PLOF and address remaining functional goals. The manual therapy interventions were performed at a separate and distinct time from the therapeutic activities interventions . (see flow sheet as applicable)     Details if applicable:  R PROM ABD, flex, ER, IR. STM: R UT, Rotator cuff muscles, biceps. STM: L rotator cuff muscles: PROM all directions. 02271 Neuromuscular Re-Education (timed):  improve balance, coordination, kinesthetic sense, posture, core stability and proprioception to improve patient's ability to develop conscious control of individual muscles and awareness of position of extremities in order to progress to PLOF and address remaining functional goals.  (see flow sheet as applicable) Details if applicable:            Details if applicable:            Details if applicable:     27  [facility] Totals Reminder: bill using total billable min of TIMED therapeutic procedures (example: do not include dry needle or estim unattended, both untimed codes, in totals to left)  8-22 min = 1 unit; 23-37 min = 2 units; 38-52 min = 3 units; 53-67 min = 4 units; 68-82 min = 5 units   Total Total     [x]  Patient Education billed concurrently with other procedures   [x] Review HEP    [] Progressed/Changed HEP, detail:    [] Other detail:       Objective Information/Functional Measures/Assessment  Patient's chief complaint is L shoulder pain. She notes that she is not getting sleep because of the pain. She responded well to MT to L shoulder noting less pain. Continue to progress as tolerated and per POC/protocol. Progress toward goals / Updated goals:  []  See Progress Note/Recertification    All goals remain applicable as patient is 3 weeks post op.   PLAN  YES Continue plan of care  [x]  Upgrade activities as tolerated  []  Discharge due to :  []  Other:    Ady Ventura, PTA DPT, OCS   3/1/2023    4:06 PM    Future Appointments   Date Time Provider [facility]   3/6/2023  3:00 PM [facility]   3/9/2023  2:30 PM Clare Read, PT [address]   3/13/2023  1:30 PM Clare Read, PT [facility]   3/15/2023  1:30 PM [facility]   3/20/2023  2:00 PM Clare Read, PT [address]   3/23/2023  4:00 PM Clare Read, PT [facility]   3/27/2023  3:00 PM [facility]   3/29/2023  2:00 PM Clare Read, PT [address]

## 2023-03-06 ENCOUNTER — HOSPITAL ENCOUNTER (OUTPATIENT)
Facility: HOSPITAL | Age: 78
Setting detail: RECURRING SERIES
Discharge: HOME | End: 2023-03-09
Payer: MEDICARE

## 2023-03-06 PROCEDURE — 97140 MANUAL THERAPY 1/> REGIONS: CPT

## 2023-03-06 PROCEDURE — 97112 NEUROMUSCULAR REEDUCATION: CPT

## 2023-03-07 NOTE — PROGRESS NOTES
PHYSICAL THERAPY - DAILY TREATMENT NOTE    Patient Name: Brayden Vu    Date: 3/6/2023    : 1945  Insurance: Payor: MEDICARE / Plan: MEDICARE PART A AND B / Product Type: *No Product type* /      Patient  verified YES   Visit #   Current / Total 4 12-18   Time   In / Out 300 330   Pain   In / Out 0 0   Subjective Functional Status/Changes: Patient reports L shoulder continues to be painful. Says that she is waiting to take sling off for next appt 3/9/2023   Changes to:  Meds, Allergies, Med Hx, Sx Hx? If yes, update Summary List NO     TREATMENT AREA =  Pain in right shoulder [M25.511]    OBJECTIVE        Therapeutic Procedures: Tx Min Billable or 1:1 Min (if diff from Tx Min) Procedure, Rationale, Specifics     03918 Self Care/Home Management (timed):  improve patient knowledge and understanding of pain reducing techniques, positioning, posture/ergonomics, activity modification, and sling positoning and protocol advice/troubleshooting  to improve patient's ability to progress to PLOF and address remaining functional goals. (see flow sheet as applicable)     Details if applicable:       20  67748 Manual Therapy (timed):  increase ROM and increase tissue extensibility to improve patient's ability to progress to PLOF and address remaining functional goals. The manual therapy interventions were performed at a separate and distinct time from the therapeutic activities interventions . (see flow sheet as applicable)     Details if applicable:  R PROM ABD, flex, ER, IR. STM: R UT, Rotator cuff muscles, biceps. STM: L rotator cuff muscles: PROM all directions. 10     [id number] Neuromuscular Re-Education (timed):  improve balance, coordination, kinesthetic sense, posture, core stability and proprioception to improve patient's ability to develop conscious control of individual muscles and awareness of position of extremities in order to progress to PLOF and address remaining functional goals.  (see flow sheet as applicable)    Details if applicable:            Details if applicable:            Details if applicable:     27  [facility] Totals Reminder: bill using total billable min of TIMED therapeutic procedures (example: do not include dry needle or estim unattended, both untimed codes, in totals to left)  8-22 min = 1 unit; 23-37 min = 2 units; 38-52 min = 3 units; 53-67 min = 4 units; 68-82 min = 5 units   Total Total     [x]  Patient Education billed concurrently with other procedures   [x] Review HEP    [] Progressed/Changed HEP, detail:    [] Other detail:       Objective Information/Functional Measures/Assessment  Patient continues to c/o L shoulder pain. Patient had many questions about discharging sling this week. Reviewed current HEP with pt and had her demonstrate all. Patient required some instructions on pendulums and AAROM elbow flexion/extension. Patient able to demo good form post instruction. Continue to progress as tolerated and per POC/protocol. Progress toward goals / Updated goals:  []  See Progress Note/Recertification    All goals remain applicable as patient is 4 weeks post op.   PLAN  YES Continue plan of care  [x]  Upgrade activities as tolerated  []  Discharge due to :  []  Other:    Suraj Mcmahon, PTA DPT, OCS   3/6/2023    8:26 PM    Future Appointments   Date Time Provider [facility]   3/9/2023  2:30 PM Rashid German PT [id number]   3/13/2023  1:30 PM Rashid German PT [address]   3/15/2023  1:30 PM [facility]   3/20/2023  2:00 PM Rashid German, PT [address]   3/23/2023  4:00 PM Rashid German PT [facility]   3/27/2023  3:00 PM [facility]   3/29/2023  2:00 PM Rashid German PT [address]

## 2023-03-09 ENCOUNTER — HOSPITAL ENCOUNTER (OUTPATIENT)
Facility: HOSPITAL | Age: 78
Setting detail: RECURRING SERIES
Discharge: HOME | End: 2023-03-12
Payer: MEDICARE

## 2023-03-09 PROCEDURE — 97110 THERAPEUTIC EXERCISES: CPT

## 2023-03-09 PROCEDURE — 97140 MANUAL THERAPY 1/> REGIONS: CPT

## 2023-03-09 NOTE — PROGRESS NOTES
PHYSICAL THERAPY - DAILY TREATMENT NOTE    Patient Name: Margarita Bob    Date: 3/9/2023    : 1945  Insurance: Payor: MEDICARE / Plan: MEDICARE PART A AND B / Product Type: *No Product type* /      Patient  verified YES   Visit #   Current / Total 5 12-18   Time   In / Out 2:30 3:10   Pain   In / Out 0 0   Subjective Functional Status/Changes:   Pt reports the left shoulder is giving her problems. She wishes to see if she can get out of the sling. Says the right arm feels great. Changes to:  Meds, Allergies, Med Hx, Sx Hx? If yes, update Summary List NO     TREATMENT AREA =  Pain in right shoulder [M25.511]    OBJECTIVE  CP to right shoulder in supine post session, 10'    Therapeutic Procedures: Tx Min Billable or 1:1 Min (if diff from Tx Min) Procedure, Rationale, Specifics   20  25907 Therapeutic Exercise (timed):  increase ROM, strength, coordination, balance, and proprioception to improve patient's ability to progress to PLOF and address remaining functional goals. (see flow sheet as applicable)       Details if applicable:  see flowsheet     10  22975 Manual Therapy (timed):  increase ROM and increase tissue extensibility to improve patient's ability to progress to PLOF and address remaining functional goals. The manual therapy interventions were performed at a separate and distinct time from the therapeutic activities interventions . (see flow sheet as applicable)     Details if applicable:  Right shoulder PROM and deltoid STM   -     00087 Neuromuscular Re-Education (timed):  improve balance, coordination, kinesthetic sense, posture, core stability and proprioception to improve patient's ability to develop conscious control of individual muscles and awareness of position of extremities in order to progress to PLOF and address remaining functional goals.  (see flow sheet as applicable)    Details if applicable:            Details if applicable:            Details if applicable:     27   [facility] Totals Reminder: bill using total billable min of TIMED therapeutic procedures (example: do not include dry needle or estim unattended, both untimed codes, in totals to left)  8-22 min = 1 unit; 23-37 min = 2 units; 38-52 min = 3 units; 53-67 min = 4 units; 68-82 min = 5 units   Total Total     [x]  Patient Education billed concurrently with other procedures   [x] Review HEP    [] Progressed/Changed HEP, detail:    [] Other detail:       Objective Information/Functional Measures/Assessment    I Dc'd sling today and added AAROM drills to session. Pt demonstrates excellent AROM in session in standing (pt with noted scoliotic changes which do affect function and performance)  Progressed pt per protocol. See flowsheet. RIGHT shoulder AROM:  Flexion: 95 deg  ABD: 85 deg  Arm at 45 deg: ER: 30 deg    Progress toward goals / Updated goals:  []  See Progress Note/Recertification    Short Term Goals: To be accomplished in 3 weeks  Pt to report adherence and demonstrate compliance with basic HEP to allow progress between visits  Status at last Eval: Initiated  Current Status: Compliant to date  Goal Met? MET  2. Pt to report pain <3/10 at worst to allow improved function and QOL  Status at last Eval: 7/10 at worst  Current Status: 0/10  Goal Met? MET  3. Pt to demonstrate RIGHT shoulder flexion PROM to 120 deg, ABD to 85 deg without increase in pain towards improved reach and ADL. Status at last Eval: 110 deg Flex, 67 deg ABD  Current Status: -  Goal Met?  -     Long Term Goals: To be accomplished in 6 weeks  Pt to improve FOTO score to 56/100 indicating improved function in daily tasks  Status at last Eval: 25/100  Current Status: -  Goal Met?  -  2. Pt to indicate a Global Rating Of Change (GROC) of 4+ indicating \"moderately better\"  Status at last Eval: n/a  Current Status: -  Goal Met?  -  3.  Pt to improve AROM of the RIGHT shoulder: Flexion 100 deg, ABD 80 deg, Functional ER to ipsilateral occiput, Functional IR to L2 to improve QOL  Status at last Eval: NT  Current Status: -  Goal Met? -  4.  Pt to demonstrate deltoid MMT to 4/5 in flex, abd, ext towards improved home chore completion  Status at last Eval: NT  Current Status: -  Goal Met?  -    PLAN  YES Continue plan of care  [x]  Upgrade activities as tolerated  []  Discharge due to :  []  Other:    Juan Pearson, PT DPT, OCS   3/9/2023    11:42 AM    Future Appointments   Date Time Provider [facility]   3/9/2023  2:30 PM Juan Pearson, PT [address]   3/13/2023  1:30 PM Juan Pearson PT [facility]   3/15/2023  1:30 PM Peterson Car, [facility]   3/20/2023  2:00 PM Juan Pearson PT [facility]   3/23/2023  4:00 PM Juan Pearson PT [facility]   3/27/2023  3:00 PM Petersno Car, [facility]   3/29/2023  2:00 PM Juan Pearson, PT [address]

## 2023-03-13 ENCOUNTER — HOSPITAL ENCOUNTER (OUTPATIENT)
Facility: HOSPITAL | Age: 78
Setting detail: RECURRING SERIES
Discharge: HOME | End: 2023-03-16
Payer: MEDICARE

## 2023-03-13 PROCEDURE — 97110 THERAPEUTIC EXERCISES: CPT

## 2023-03-13 PROCEDURE — 97140 MANUAL THERAPY 1/> REGIONS: CPT

## 2023-03-13 NOTE — PROGRESS NOTES
PHYSICAL THERAPY - DAILY TREATMENT NOTE    Patient Name: Tanya Boothe    Date: 3/13/2023    : 1945  Insurance: Payor: MEDICARE / Plan: MEDICARE PART A AND B / Product Type: *No Product type* /      Patient  verified YES   Visit #   Current / Total 6 12-18   Time   In / Out 1:30 2:10   Pain   In / Out 0 0   Subjective Functional Status/Changes:   Says the shoulder is sore from using it more and she is fearful of it hurting. Changes to:  Meds, Allergies, Med Hx, Sx Hx? If yes, update Summary List NO     TREATMENT AREA =  Pain in right shoulder [M25.511]    OBJECTIVE    CP to right shoulder in supine post session, 10'    Therapeutic Procedures: Tx Min Billable or 1:1 Min (if diff from Tx Min) Procedure, Rationale, Specifics   20  88848 Therapeutic Exercise (timed):  increase ROM, strength, coordination, balance, and proprioception to improve patient's ability to progress to PLOF and address remaining functional goals. (see flow sheet as applicable)       Details if applicable:  see flowsheet     10  35560 Manual Therapy (timed):  increase ROM and increase tissue extensibility to improve patient's ability to progress to PLOF and address remaining functional goals. The manual therapy interventions were performed at a separate and distinct time from the therapeutic activities interventions . (see flow sheet as applicable)     Details if applicable:  Right shoulder PROM and deltoid STM   -     62736 Neuromuscular Re-Education (timed):  improve balance, coordination, kinesthetic sense, posture, core stability and proprioception to improve patient's ability to develop conscious control of individual muscles and awareness of position of extremities in order to progress to PLOF and address remaining functional goals.  (see flow sheet as applicable)    Details if applicable:            Details if applicable:            Details if applicable:     27  [facility] Totals Reminder: bill using total billable min of TIMED therapeutic procedures (example: do not include dry needle or estim unattended, both untimed codes, in totals to left)  8-22 min = 1 unit; 23-37 min = 2 units; 38-52 min = 3 units; 53-67 min = 4 units; 68-82 min = 5 units   Total Total     [x]  Patient Education billed concurrently with other procedures   [x] Review HEP    [] Progressed/Changed HEP, detail:    [] Other detail:       Objective Information/Functional Measures/Assessment    Worked AAROM, AROM and deltoid resistance drills for function today. Manual and CP to finish. RIGHT shoulder AROM:  Flexion: 95 deg  ABD: 85 deg  Arm at 45 deg: ER: 30 deg    Progress toward goals / Updated goals:  []  See Progress Note/Recertification    Short Term Goals: To be accomplished in 3 weeks  Pt to report adherence and demonstrate compliance with basic HEP to allow progress between visits  Status at last Eval: Initiated  Current Status: Compliant to date  Goal Met? MET  2. Pt to report pain <3/10 at worst to allow improved function and QOL  Status at last Eval: 7/10 at worst  Current Status: 0/10  Goal Met? MET  3. Pt to demonstrate RIGHT shoulder flexion PROM to 120 deg, ABD to 85 deg without increase in pain towards improved reach and ADL. Status at last Eval: 110 deg Flex, 67 deg ABD  Current Status: -  Goal Met?  -     Long Term Goals: To be accomplished in 6 weeks  Pt to improve FOTO score to 56/100 indicating improved function in daily tasks  Status at last Eval: 25/100  Current Status: -  Goal Met?  -  2. Pt to indicate a Global Rating Of Change (GROC) of 4+ indicating \"moderately better\"  Status at last Eval: n/a  Current Status: -  Goal Met?  -  3. Pt to improve AROM of the RIGHT shoulder: Flexion 100 deg, ABD 80 deg, Functional ER to ipsilateral occiput, Functional IR to L2 to improve QOL  Status at last Eval: NT  Current Status: -  Goal Met? -  4.  Pt to demonstrate deltoid MMT to 4/5 in flex, abd, ext towards improved home chore completion  Status at last Eval: NT  Current Status: -  Goal Met?  -    PLAN  YES Continue plan of care  [x]  Upgrade activities as tolerated  []  Discharge due to :  []  Other:    Saranya Saul, PT DPT, OCS   3/13/2023    9:50 AM    Future Appointments   Date Time Provider Tigist Ortega   3/13/2023  1:30 PM Saranya Saul, PT [facility]   3/15/2023  1:30 PM Daniela Alaniz PTA [facility]   3/20/2023  2:00 PM Saranya Saul, PT [id number]   3/23/2023  4:00 PM Saranya Saul PT [facility]   3/27/2023  3:00 PM Charisma Liz Joyner [facility]   3/29/2023  2:00 PM Saranya Saul, PT [id number]

## 2023-03-15 ENCOUNTER — HOSPITAL ENCOUNTER (OUTPATIENT)
Facility: HOSPITAL | Age: 78
Setting detail: RECURRING SERIES
Discharge: HOME | End: 2023-03-18
Payer: MEDICARE

## 2023-03-15 PROCEDURE — 97112 NEUROMUSCULAR REEDUCATION: CPT

## 2023-03-15 PROCEDURE — 97140 MANUAL THERAPY 1/> REGIONS: CPT

## 2023-03-15 PROCEDURE — 97110 THERAPEUTIC EXERCISES: CPT

## 2023-03-15 NOTE — PROGRESS NOTES
PHYSICAL THERAPY - DAILY TREATMENT NOTE    Patient Name: Joni Baird    Date: 3/15/2023    : 1945  Insurance: Payor: MEDICARE / Plan: MEDICARE PART A AND B / Product Type: *No Product type* /      Patient  verified YES   Visit #   Current / Total 7 12-18   Time   In / Out 1:30 2:15   Pain   In / Out 1 1   Subjective Functional Status/Changes:   Says the L shoulder is more painful. Changes to:  Meds, Allergies, Med Hx, Sx Hx? If yes, update Summary List NO     TREATMENT AREA =  Pain in right shoulder [M25.511]    OBJECTIVE        Therapeutic Procedures: Tx Min Billable or 1:1 Min (if diff from Tx Min) Procedure, Rationale, Specifics   35  45421 Therapeutic Exercise (timed):  increase ROM, strength, coordination, balance, and proprioception to improve patient's ability to progress to PLOF and address remaining functional goals. (see flow sheet as applicable)       Details if applicable:  see flowsheet       67728 Manual Therapy (timed):  increase ROM and increase tissue extensibility to improve patient's ability to progress to PLOF and address remaining functional goals. The manual therapy interventions were performed at a separate and distinct time from the therapeutic activities interventions . (see flow sheet as applicable)     Details if applicable:  Right shoulder PROM and deltoid STM   10     36893 Neuromuscular Re-Education (timed):  improve balance, coordination, kinesthetic sense, posture, core stability and proprioception to improve patient's ability to develop conscious control of individual muscles and awareness of position of extremities in order to progress to PLOF and address remaining functional goals.  (see flow sheet as applicable)    Details if applicable:            Details if applicable:            Details if applicable:     39  [facility] Totals Reminder: bill using total billable min of TIMED therapeutic procedures (example: do not include dry needle or estim unattended, both untimed codes, in totals to left)  8-22 min = 1 unit; 23-37 min = 2 units; 38-52 min = 3 units; 53-67 min = 4 units; 68-82 min = 5 units   Total Total     [x]  Patient Education billed concurrently with other procedures   [x] Review HEP    [x] Progressed/Changed HEP, detail: Tricep extensions and rows   [] Other detail:       Objective Information/Functional Measures/Assessment    Continued working on [employer], AROM and strengthening drills for function today. No measurements taken this visit  RIGHT shoulder AROM:  Flexion: 95 deg  ABD: 85 deg  Arm at 45 deg: ER: 30 deg    Progress toward goals / Updated goals:  []  See Progress Note/Recertification    Short Term Goals: To be accomplished in 3 weeks  Pt to report adherence and demonstrate compliance with basic HEP to allow progress between visits  Status at last Eval: Initiated  Current Status: Compliant to date  Goal Met? MET  2. Pt to report pain <3/10 at worst to allow improved function and QOL  Status at last Eval: 7/10 at worst  Current Status: 0/10  Goal Met? MET  3. Pt to demonstrate RIGHT shoulder flexion PROM to 120 deg, ABD to 85 deg without increase in pain towards improved reach and ADL. Status at last Eval: 110 deg Flex, 67 deg ABD  Current Status: - Progressing with AAROM = ~110 deg with pulleys - 3/15/23  Goal Met?  -     Long Term Goals: To be accomplished in 6 weeks  Pt to improve FOTO score to 56/100 indicating improved function in daily tasks  Status at last Eval: 25/100  Current Status: -  Goal Met?  -  2. Pt to indicate a Global Rating Of Change (GROC) of 4+ indicating \"moderately better\"  Status at last Eval: n/a  Current Status: -  Goal Met?  -  3. Pt to improve AROM of the RIGHT shoulder: Flexion 100 deg, ABD 80 deg, Functional ER to ipsilateral occiput, Functional IR to L2 to improve QOL  Status at last Eval: NT  Current Status: -  Goal Met? -  4.  Pt to demonstrate deltoid MMT to 4/5 in flex, abd, ext towards improved home chore completion  Status at last Eval: NT  Current Status: -  Goal Met?  -    PLAN  YES Continue plan of care  [x]  Upgrade activities as tolerated  []  Discharge due to :  []  Other:    Beatriz Blankenship, KOLBY DPT, OCS   3/15/2023    3:12 PM    Future Appointments   Date Time Provider Tigist Ortega   3/20/2023  2:00 PM Rob Braga, PT [id number]   3/23/2023  4:00 PM Rob Braga, PT [facility]   3/27/2023  3:00 PM [address], KOLBY [facility]   3/29/2023  2:00 PM Rob Braga, PT [id number]

## 2023-03-20 ENCOUNTER — HOSPITAL ENCOUNTER (OUTPATIENT)
Facility: HOSPITAL | Age: 78
Setting detail: RECURRING SERIES
Discharge: HOME | End: 2023-03-23
Payer: MEDICARE

## 2023-03-20 PROCEDURE — 97535 SELF CARE MNGMENT TRAINING: CPT

## 2023-03-20 PROCEDURE — 97140 MANUAL THERAPY 1/> REGIONS: CPT

## 2023-03-20 NOTE — THERAPY RECERTIFICATION
MET     Long Term Goals: To be accomplished in 6 weeks  Pt to improve FOTO score to 56/100 indicating improved function in daily tasks  Status at last Eval: 25/100  Current Status: 35/100  Goal Met? Progressing  2. Pt to indicate a Global Rating Of Change (GROC) of 4+ indicating \"moderately better\"  Status at last Eval: n/a  Current Status: -  Goal Met?  -  3. Pt to improve AROM of the RIGHT shoulder: Flexion 100 deg, ABD 80 deg, Functional ER to ipsilateral occiput, Functional IR to L2 to improve QOL  Status at last Eval: NT  Current Status: -  Goal Met? -  4. Pt to demonstrate deltoid MMT to 4/5 in flex, abd, ext towards improved home chore completion  Status at last Eval: NT  Current Status: -  Goal Met?  -    New Goals to be achieved in __10__ treatments  Unmet goals above    If you have any questions/comments please contact us directly at ([phone]. Thank you for allowing us to assist in the care of your patient. Val Stevens, PT DPT, OCS      3/20/2023       2:44 PM    Payor: MEDICARE / Plan: MEDICARE PART A AND B / Product Type: *No Product type* /     Medicare, cannot change goals, cannot adjust frequency/duration, no signature required     RECOMMENDATIONS  Continue therapy per initial Plan of Care or most recent Medicare Recert.       Val Stevens PT       3/20/2023       2:45 PM

## 2023-03-20 NOTE — PROGRESS NOTES
PHYSICAL THERAPY - DAILY TREATMENT NOTE    Patient Name: Britt Yeh    Date: 3/20/2023    : 1945  Insurance: Payor: MEDICARE / Plan: MEDICARE PART A AND B / Product Type: *No Product type* /      Patient  verified YES   Visit #   Current / Total 8 12-18   Time   In / Out 2:00 2:30   Pain   In / Out 5 5   Subjective Functional Status/Changes:   Pt reports she is 50% of where she wants to be. Reports she is having pain in the shoulder, the front part of the shoulder. Pain is constant, 5/10. \"Moderate. \" Says she's been using the left shoulder so much that it hurts. Changes to:  Meds, Allergies, Med Hx, Sx Hx? If yes, update Summary List NO     TREATMENT AREA =  Pain in right shoulder [M25.511]    OBJECTIVE    Therapeutic Procedures: Tx Min Billable or 1:1 Min (if diff from Tx Min) Procedure, Rationale, Specifics   -  72366 Therapeutic Exercise (timed):  increase ROM, strength, coordination, balance, and proprioception to improve patient's ability to progress to PLOF and address remaining functional goals. (see flow sheet as applicable)       Details if applicable:  see flowsheet     10  13158 Manual Therapy (timed):  increase ROM and increase tissue extensibility to improve patient's ability to progress to PLOF and address remaining functional goals. The manual therapy interventions were performed at a separate and distinct time from the therapeutic activities interventions . (see flow sheet as applicable)     Details if applicable:  Right shoulder PROM and deltoid STM   -     05359 Neuromuscular Re-Education (timed):  improve balance, coordination, kinesthetic sense, posture, core stability and proprioception to improve patient's ability to develop conscious control of individual muscles and awareness of position of extremities in order to progress to PLOF and address remaining functional goals.  (see flow sheet as applicable)    Details if applicable:     20   50901 Self Care/Home Management

## 2023-03-23 ENCOUNTER — HOSPITAL ENCOUNTER (OUTPATIENT)
Facility: HOSPITAL | Age: 78
Setting detail: RECURRING SERIES
Discharge: HOME | End: 2023-03-26
Payer: MEDICARE

## 2023-03-23 PROCEDURE — 97110 THERAPEUTIC EXERCISES: CPT

## 2023-03-23 NOTE — PROGRESS NOTES
PHYSICAL THERAPY - DAILY TREATMENT NOTE    Patient Name: Tram Rincon    Date: 3/23/2023    : 1945  Insurance: Payor: MEDICARE / Plan: MEDICARE PART A AND B / Product Type: *No Product type* /      Patient  verified YES   Visit #   Current / Total 9 18   Time   In / Out 4:00 4:30   Pain   In / Out 0 0   Subjective Functional Status/Changes:   Had a doctor visit and they said she is right where she needs to be. They took an Xray of the left shoulder and got an injection of steroids in the left shoulder. She said she will have another follow-up in May. Changes to:  Meds, Allergies, Med Hx, Sx Hx? If yes, update Summary List NO     TREATMENT AREA =  Pain in right shoulder [M25.511]    OBJECTIVE    Therapeutic Procedures: Tx Min Billable or 1:1 Min (if diff from Tx Min) Procedure, Rationale, Specifics   30  07219 Therapeutic Exercise (timed):  increase ROM, strength, coordination, balance, and proprioception to improve patient's ability to progress to PLOF and address remaining functional goals. (see flow sheet as applicable)       Details if applicable:  see flowsheet     -  32685 Manual Therapy (timed):  increase ROM and increase tissue extensibility to improve patient's ability to progress to PLOF and address remaining functional goals. The manual therapy interventions were performed at a separate and distinct time from the therapeutic activities interventions . (see flow sheet as applicable)     Details if applicable:  Right shoulder PROM and deltoid STM   -     91040 Neuromuscular Re-Education (timed):  improve balance, coordination, kinesthetic sense, posture, core stability and proprioception to improve patient's ability to develop conscious control of individual muscles and awareness of position of extremities in order to progress to PLOF and address remaining functional goals.  (see flow sheet as applicable)    Details if applicable:     -   69246 Self Care/Home Management (timed):

## 2023-03-27 ENCOUNTER — HOSPITAL ENCOUNTER (OUTPATIENT)
Facility: HOSPITAL | Age: 78
Setting detail: RECURRING SERIES
Discharge: HOME | End: 2023-03-30
Payer: MEDICARE

## 2023-03-27 PROCEDURE — 97110 THERAPEUTIC EXERCISES: CPT

## 2023-03-27 NOTE — PROGRESS NOTES
PHYSICAL THERAPY - DAILY TREATMENT NOTE    Patient Name: Hernandez Calzada    Date: 3/27/2023    : 1945  Insurance: Payor: MEDICARE / Plan: MEDICARE PART A AND B / Product Type: *No Product type* /      Patient  verified YES   Visit #   Current / Total 10 18   Time   In / Out 300 330   Pain   In / Out 0 0   Subjective Functional Status/Changes:   Reports no difference in pain for L shoulder post injection. Says that she does feel stronger on R shoulder. Changes to:  Meds, Allergies, Med Hx, Sx Hx? If yes, update Summary List NO     TREATMENT AREA =  Pain in right shoulder [M25.511]    OBJECTIVE    Therapeutic Procedures: Tx Min Billable or 1:1 Min (if diff from Tx Min) Procedure, Rationale, Specifics   30  73888 Therapeutic Exercise (timed):  increase ROM, strength, coordination, balance, and proprioception to improve patient's ability to progress to PLOF and address remaining functional goals. (see flow sheet as applicable)       Details if applicable:  see flowsheet     -  62817 Manual Therapy (timed):  increase ROM and increase tissue extensibility to improve patient's ability to progress to PLOF and address remaining functional goals. The manual therapy interventions were performed at a separate and distinct time from the therapeutic activities interventions . (see flow sheet as applicable)     Details if applicable:  Right shoulder PROM and deltoid STM   -     34014 Neuromuscular Re-Education (timed):  improve balance, coordination, kinesthetic sense, posture, core stability and proprioception to improve patient's ability to develop conscious control of individual muscles and awareness of position of extremities in order to progress to PLOF and address remaining functional goals.  (see flow sheet as applicable)    Details if applicable:     -   76646 Self Care/Home Management (timed):  improve patient knowledge and understanding of pain reducing techniques, posture/ergonomics,

## 2023-03-29 ENCOUNTER — HOSPITAL ENCOUNTER (OUTPATIENT)
Facility: HOSPITAL | Age: 78
Setting detail: RECURRING SERIES
Discharge: HOME | End: 2023-04-01
Payer: MEDICARE

## 2023-03-29 PROCEDURE — 97110 THERAPEUTIC EXERCISES: CPT

## 2023-03-29 PROCEDURE — 97140 MANUAL THERAPY 1/> REGIONS: CPT

## 2023-03-29 NOTE — PROGRESS NOTES
procedures   [x] Review HEP    [] Progressed/Changed HEP, detail:    [] Other detail:       Objective Information/Functional Measures/Assessment    Pt completed active warm-up and early phase resistance and functional strength drills in session. Good response to drills, no adverse events noted. Progress toward goals / Updated goals:  []  See Progress Note/Recertification    Short Term Goals: To be accomplished in 3 weeks  Pt to report adherence and demonstrate compliance with basic HEP to allow progress between visits  Status at last Eval: Initiated  Current Status: Compliant to date  Goal Met? MET  2. Pt to report pain <3/10 at worst to allow improved function and QOL  Status at last Eval: 7/10 at worst  Current Status: 0/10  Goal Met? MET  3. Pt to demonstrate RIGHT shoulder flexion PROM to 120 deg, ABD to 85 deg without increase in pain towards improved reach and ADL. Status at last Eval: 110 deg Flex, 67 deg ABD  Current Status: - Flex: 127 deg, ABD: 90  Goal Met? YES     Long Term Goals: To be accomplished in 6 weeks  Pt to improve FOTO score to 56/100 indicating improved function in daily tasks  Status at last Eval: 25/100  Current Status: 35/100  Goal Met? Progressing  2. Pt to indicate a Global Rating Of Change (GROC) of 4+ indicating \"moderately better\"  Status at last Eval: n/a  Current Status: -  Goal Met?  -  3. Pt to improve AROM of the RIGHT shoulder: Flexion 100 deg, ABD 80 deg, Functional ER to ipsilateral occiput, Functional IR to L2 to improve QOL  Status at last Eval: NT  Current Status: -  Goal Met? -  4.  Pt to demonstrate deltoid MMT to 4/5 in flex, abd, ext towards improved home chore completion  Status at last Eval: NT  Current Status: - Progressing with increases in intensity tolerated - 3/29/23  Goal Met?  -    PLAN  YES Continue plan of care  [x]  Upgrade activities as tolerated  []  Discharge due to :  []  Other:    Kirstie Hua, PT   3/29/2023    12:23 PM    Future

## 2023-04-05 ENCOUNTER — HOSPITAL ENCOUNTER (OUTPATIENT)
Facility: HOSPITAL | Age: 78
Setting detail: RECURRING SERIES
Discharge: HOME | End: 2023-04-08
Payer: MEDICARE

## 2023-04-05 PROCEDURE — 97140 MANUAL THERAPY 1/> REGIONS: CPT

## 2023-04-05 PROCEDURE — 97110 THERAPEUTIC EXERCISES: CPT

## 2023-04-05 NOTE — PROGRESS NOTES
Progressed/Changed HEP, detail:    [] Other detail:       Objective Information/Functional Measures/Assessment    Pt with left shoulder complaints and we completed session on Right UE only today. Progress toward goals / Updated goals:  []  See Progress Note/Recertification    Short Term Goals: To be accomplished in 3 weeks  Pt to report adherence and demonstrate compliance with basic HEP to allow progress between visits  Status at last Eval: Initiated  Current Status: Compliant to date  Goal Met? MET  2. Pt to report pain <3/10 at worst to allow improved function and QOL  Status at last Eval: 7/10 at worst  Current Status: 0/10  Goal Met? MET  3. Pt to demonstrate RIGHT shoulder flexion PROM to 120 deg, ABD to 85 deg without increase in pain towards improved reach and ADL. Status at last Eval: 110 deg Flex, 67 deg ABD  Current Status: - Flex: 127 deg, ABD: 90  Goal Met? YES     Long Term Goals: To be accomplished in 6 weeks  Pt to improve FOTO score to 56/100 indicating improved function in daily tasks  Status at last Eval: 25/100  Current Status: 35/100  Goal Met? Progressing  2. Pt to indicate a Global Rating Of Change (GROC) of 4+ indicating \"moderately better\"  Status at last Eval: n/a  Current Status: -  Goal Met?  -  3. Pt to improve AROM of the RIGHT shoulder: Flexion 100 deg, ABD 80 deg, Functional ER to ipsilateral occiput, Functional IR to L2 to improve QOL  Status at last Eval: NT  Current Status: -  Goal Met? -  4.  Pt to demonstrate deltoid MMT to 4/5 in flex, abd, ext towards improved home chore completion  Status at last Eval: NT  Current Status: - Progressing with increases in intensity tolerated - 3/29/23  Goal Met?  -    PLAN  YES Continue plan of care  [x]  Upgrade activities as tolerated  []  Discharge due to :  []  Other:    Monica Saavedra, PT   4/5/2023    8:25 AM    Future Appointments   Date Time Provider [facility]ist [facility]   4/5/2023  2:00 PM Monica Saavedra, PT [id number]

## 2023-04-10 ENCOUNTER — APPOINTMENT (OUTPATIENT)
Facility: HOSPITAL | Age: 78
End: 2023-04-10
Payer: MEDICARE

## 2023-04-18 ENCOUNTER — HOSPITAL ENCOUNTER (OUTPATIENT)
Facility: HOSPITAL | Age: 78
Setting detail: RECURRING SERIES
Discharge: HOME | End: 2023-04-21
Payer: MEDICARE

## 2023-04-18 PROCEDURE — 97140 MANUAL THERAPY 1/> REGIONS: CPT

## 2023-04-18 PROCEDURE — 97110 THERAPEUTIC EXERCISES: CPT

## 2023-04-18 NOTE — PROGRESS NOTES
Review HEP    [] Progressed/Changed HEP, detail:    [] Other detail:       Objective Information/Functional Measures/Assessment    Pt taken through AAROM and AROM and light resistance drills to tolerance. Will PN NV. AROM progressing very well. Progress toward goals / Updated goals:  []  See Progress Note/Recertification    Short Term Goals: To be accomplished in 3 weeks  Pt to report adherence and demonstrate compliance with basic HEP to allow progress between visits  Status at last Eval: Initiated  Current Status: Compliant to date  Goal Met? MET  2. Pt to report pain <3/10 at worst to allow improved function and QOL  Status at last Eval: 7/10 at worst  Current Status: 0/10  Goal Met? MET  3. Pt to demonstrate RIGHT shoulder flexion PROM to 120 deg, ABD to 85 deg without increase in pain towards improved reach and ADL. Status at last Eval: 110 deg Flex, 67 deg ABD  Current Status: - Flex: 127 deg, ABD: 90  Goal Met? YES     Long Term Goals: To be accomplished in 6 weeks  Pt to improve FOTO score to 56/100 indicating improved function in daily tasks  Status at last Eval: 25/100  Current Status: 35/100  Goal Met? Progressing  2. Pt to indicate a Global Rating Of Change (GROC) of 4+ indicating \"moderately better\"  Status at last Eval: n/a  Current Status: -  Goal Met?  -  3. Pt to improve AROM of the RIGHT shoulder: Flexion 100 deg, ABD 80 deg, Functional ER to ipsilateral occiput, Functional IR to L2 to improve QOL  Status at last Eval: NT  Current Status: Flex 105 deg, ABD 95 deg, OBED occiput ipsilateral, FIR ipsilateral glute  Goal Met? ALL but FIR.(4/18/23)  4.  Pt to demonstrate deltoid MMT to 4/5 in flex, abd, ext towards improved home chore completion  Status at last Eval: NT  Current Status: - Progressing with increases in intensity tolerated - 4/12/23  Goal Met?  -    PLAN  YES Continue plan of care  [x]  Upgrade activities as tolerated  []  Discharge due to :  []  Other:    Niles Jeffrey, PT

## 2023-04-20 ENCOUNTER — HOSPITAL ENCOUNTER (OUTPATIENT)
Facility: HOSPITAL | Age: 78
Setting detail: RECURRING SERIES
Discharge: HOME | End: 2023-04-23
Payer: MEDICARE

## 2023-04-20 PROCEDURE — 97110 THERAPEUTIC EXERCISES: CPT

## 2023-04-20 PROCEDURE — 97535 SELF CARE MNGMENT TRAINING: CPT

## 2023-04-24 ENCOUNTER — APPOINTMENT (OUTPATIENT)
Facility: HOSPITAL | Age: 78
End: 2023-04-24
Payer: MEDICARE

## 2024-05-07 ENCOUNTER — HOSPITAL ENCOUNTER (OUTPATIENT)
Facility: HOSPITAL | Age: 79
Setting detail: RECURRING SERIES
Discharge: HOME | End: 2024-05-10
Payer: MEDICARE

## 2024-05-07 PROCEDURE — 97110 THERAPEUTIC EXERCISES: CPT

## 2024-05-07 PROCEDURE — 97535 SELF CARE MNGMENT TRAINING: CPT

## 2024-05-07 PROCEDURE — 97162 PT EVAL MOD COMPLEX 30 MIN: CPT

## 2024-05-07 NOTE — THERAPY EVALUATION
[facility]  [address], [address], [place] [address] Ph:[phone] Fx: [phone]  Plan of Care / Statement of Necessity for Physical Therapy Services     Patient Name: Radha Jordan : 1945   Medical   Diagnosis: Left shoulder pain [M25.512] Treatment Diagnosis: M25.512  LEFT SHOULDER PAIN    Onset Date: 3/26/24 DOS     Referral Source: Yonathan Meadows* Start of Care (SOC): 2024   Prior Hospitalization: See medical history Provider #: [id number]   Prior Level of Function: Pain and dysfunction, loss of range and weakness in both UEs   Comorbidities: Arthritis, chronic back pain, scoliosis, GI disease, HTN, osteoporosis, Right shoulder pain/fracture   Subjective: Reports RIGHT shoulder pain with  finding acromion fracture. Says was convinced to wear a sling. She instead just lived in the recliner. Then it started healing. She could then take the sling off and it still hurts. She saw Dr Meadows in January and she was able to get cleared to have the left shoulder replaced. Says the left shoulder is way better than the right.     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 Pt has no pain, has reduced AROM and weakness. Her surgical shoulder is more functional than her right at this time.   Patient would benefit from skilled intervention to address deficits, improve quality of life and return patient to maximum level of available prior function.    Post operative: URGENT: Patient was evaluated for a post operative condition and early physical therapy intervention is critical to positive outcomes.  Insurance authorization should be expedited to prevent delay in treatment.      OBJECTIVE shoulder:   Posture/Positioning: Noted scoliotic curve with right

## 2024-05-07 NOTE — PROGRESS NOTES
PHYSICAL THERAPY - DAILY TREATMENT NOTE    Patient Name: Radha Jordan    Date: 2024    : 1945  Insurance: Payor: MEDICARE / Plan: MEDICARE PART A AND B / Product Type: *No Product type* /      Patient  verified YES   Visit #   Current / Total 1 4-8   Time   In / Out 1:40 12:20   Pain   In / Out 0 0   Subjective Functional Status/Changes: SEE EVALUATION     TREATMENT AREA =  M25.512  LEFT SHOULDER PAIN     OBJECTIVE        Therapeutic Procedures:  Tx Min Billable or 1:1 Min (if diff from Tx Min) Procedure, Rationale, Specifics   10  43209 Self Care/Home Management (timed):  improve patient knowledge and understanding of positioning, home safety, activity modification, diagnosis/prognosis, and physical therapy expectations, procedures and progression  to improve patient's ability to progress to PLOF and address remaining functional goals.  (see flow sheet as applicable)     Details if applicable:       13  80636 Therapeutic Exercise (timed):  increase ROM, strength, coordination, balance, and proprioception to improve patient's ability to progress to PLOF and address remaining functional goals. (see flow sheet as applicable)     Details if applicable:            Details if applicable:            Details if applicable:            Details if applicable:     23 40 [facility] Totals Reminder: bill using total billable min of TIMED therapeutic procedures (example: do not include dry needle or estim unattended, both untimed codes, in totals to left)  8-22 min = 1 unit; 23-37 min = 2 units; 38-52 min = 3 units; 53-67 min = 4 units; 68-82 min = 5 units   Total Total     [x]  Patient Education billed concurrently with other procedures   [x] Review HEP    [] Progressed/Changed HEP, detail:    [] Other detail:       Objective Information/Functional Measures/Assessment    SEE EVALUATION    Patient will benefit from skilled PT services to  modify and progress therapeutic interventions, analyze and address functional

## 2024-05-14 ENCOUNTER — HOSPITAL ENCOUNTER (OUTPATIENT)
Facility: HOSPITAL | Age: 79
Setting detail: RECURRING SERIES
Discharge: HOME | End: 2024-05-17
Payer: MEDICARE

## 2024-05-14 PROCEDURE — 97110 THERAPEUTIC EXERCISES: CPT

## 2024-05-14 PROCEDURE — 97530 THERAPEUTIC ACTIVITIES: CPT

## 2024-05-14 NOTE — PROGRESS NOTES
PHYSICAL THERAPY - DAILY TREATMENT NOTE    Patient Name: Radha Jordan    Date: 2024    : 1945  Insurance: Payor: MEDICARE / Plan: MEDICARE PART A AND B / Product Type: *No Product type* /      Patient  verified YES   Visit #   Current / Total 2 4-8   Time   In / Out 12:20 1:00   Pain   In / Out 0 0   Subjective Functional Status/Changes:   She saw the PA and she cleared her to drive. Was told to go easy with PT and no rush to do things in PT. PA said to Hold of rows right now and to avoid weights. Says he low back hurts so that affects her doing some drills.        TREATMENT AREA =  Left shoulder pain [M25.512]    OBJECTIVE        Therapeutic Procedures:  Tx Min Billable or 1:1 Min (if diff from Tx Min) Procedure, Rationale, Specifics   32  25101 Therapeutic Exercise (timed):  increase ROM, strength, coordination, balance, and proprioception to improve patient's ability to progress to PLOF and address remaining functional goals. (see flow sheet as applicable)     Details if applicable:       8  08805 Therapeutic Activity (timed):  use of dynamic activities replicating functional movements to increase ROM, strength, coordination, balance, and proprioception in order to improve patient's ability to progress to PLOF and address remaining functional goals.  (see flow sheet as applicable)     Details if applicable:            Details if applicable:            Details if applicable:            Details if applicable:     40  [facility] Totals Reminder: bill using total billable min of TIMED therapeutic procedures (example: do not include dry needle or estim unattended, both untimed codes, in totals to left)  8-22 min = 1 unit; 23-37 min = 2 units; 38-52 min = 3 units; 53-67 min = 4 units; 68-82 min = 5 units   Total Total     [x]  Patient Education billed concurrently with other procedures   [x] Review HEP    [] Progressed/Changed HEP, detail:    [] Other detail:       Objective Information/Functional

## 2024-05-23 ENCOUNTER — HOSPITAL ENCOUNTER (OUTPATIENT)
Facility: HOSPITAL | Age: 79
Setting detail: RECURRING SERIES
Discharge: HOME | End: 2024-05-26
Payer: MEDICARE

## 2024-05-23 PROCEDURE — 97110 THERAPEUTIC EXERCISES: CPT

## 2024-05-23 PROCEDURE — 97530 THERAPEUTIC ACTIVITIES: CPT

## 2024-05-23 NOTE — PROGRESS NOTES
PHYSICAL THERAPY - DAILY TREATMENT NOTE    Patient Name: Radha Jordan    Date: 2024    : 1945  Insurance: Payor: MEDICARE / Plan: MEDICARE PART A AND B / Product Type: *No Product type* /      Patient  verified YES   Visit #   Current / Total 3 4-8   Time   In / Out 1:45 2:25   Pain   In / Out 0 0   Subjective Functional Status/Changes:   She drove here and that went well. Reports today is a good day, her back is even feeling good. The left shoulder is feeling good.     Still having vertigo. She reports her neck is also bad from her MRI.       TREATMENT AREA =  Left shoulder pain [M25.512]    OBJECTIVE        Therapeutic Procedures:  Tx Min Billable or 1:1 Min (if diff from Tx Min) Procedure, Rationale, Specifics   32  47586 Therapeutic Exercise (timed):  increase ROM, strength, coordination, balance, and proprioception to improve patient's ability to progress to PLOF and address remaining functional goals. (see flow sheet as applicable)     Details if applicable:       8  54265 Therapeutic Activity (timed):  use of dynamic activities replicating functional movements to increase ROM, strength, coordination, balance, and proprioception in order to improve patient's ability to progress to PLOF and address remaining functional goals.  (see flow sheet as applicable)     Details if applicable:            Details if applicable:            Details if applicable:            Details if applicable:     40  [facility] Totals Reminder: bill using total billable min of TIMED therapeutic procedures (example: do not include dry needle or estim unattended, both untimed codes, in totals to left)  8-22 min = 1 unit; 23-37 min = 2 units; 38-52 min = 3 units; 53-67 min = 4 units; 68-82 min = 5 units   Total Total     [x]  Patient Education billed concurrently with other procedures   [x] Review HEP    [] Progressed/Changed HEP, detail:    [] Other detail:       Objective Information/Functional Measures/Assessment    Pt

## 2024-05-30 ENCOUNTER — HOSPITAL ENCOUNTER (OUTPATIENT)
Facility: HOSPITAL | Age: 79
Setting detail: RECURRING SERIES
End: 2024-05-30
Payer: MEDICARE

## 2024-05-30 PROCEDURE — 97110 THERAPEUTIC EXERCISES: CPT

## 2024-05-30 PROCEDURE — 97530 THERAPEUTIC ACTIVITIES: CPT

## 2024-05-30 NOTE — PROGRESS NOTES
PHYSICAL THERAPY - DAILY TREATMENT NOTE    Patient Name: Radha Jordan    Date: 2024    : 1945  Insurance: Payor: MEDICARE / Plan: MEDICARE PART A AND B / Product Type: *No Product type* /      Patient  verified YES   Visit #   Current / Total 4 4-8   Time   In / Out 3:40 4:20   Pain   In / Out 0 0   Subjective Functional Status/Changes:   Pt reports she's been doing a yellow band at home. Says she has been trying to reach up in the cabinets.Pt is washing the dishes now and doing more laundry and has done some cleaning  She feels like the shoulder is getting better.       TREATMENT AREA =  Left shoulder pain [M25.512]    OBJECTIVE    Therapeutic Procedures:  Tx Min Billable or 1:1 Min (if diff from Tx Min) Procedure, Rationale, Specifics   32  24422 Therapeutic Exercise (timed):  increase ROM, strength, coordination, balance, and proprioception to improve patient's ability to progress to PLOF and address remaining functional goals. (see flow sheet as applicable)     Details if applicable:       8  01772 Therapeutic Activity (timed):  use of dynamic activities replicating functional movements to increase ROM, strength, coordination, balance, and proprioception in order to improve patient's ability to progress to PLOF and address remaining functional goals.  (see flow sheet as applicable)     Details if applicable:            Details if applicable:            Details if applicable:            Details if applicable:     40  [facility] Totals Reminder: bill using total billable min of TIMED therapeutic procedures (example: do not include dry needle or estim unattended, both untimed codes, in totals to left)  8-22 min = 1 unit; 23-37 min = 2 units; 38-52 min = 3 units; 53-67 min = 4 units; 68-82 min = 5 units   Total Total     [x]  Patient Education billed concurrently with other procedures   [x] Review HEP    [] Progressed/Changed HEP, detail:    [] Other detail:       Objective Information/Functional  Measures/Assessment    Took patient through active drills as able. Pt talkative throughout session and cues required for initiation, form and follow-through.     Patient will continue to benefit from skilled PT services to modify and progress therapeutic interventions, analyze and address functional mobility deficits, analyze and address ROM deficits, analyze and address strength deficits, analyze and cue for proper movement patterns, and analyze and modify for postural abnormalities to address functional deficits and attain remaining goals.    Progress toward goals / Updated goals:  []  See Progress Note/Recertification    Short Term Goals: To be accomplished in 3 weeks  Pt to report adherence and demonstrate compliance with basic HEP to allow progress between visits  Status at last Eval: Initiated  Current Status: -Compliant to date (5/30/24)  Goal Met?  -MET / Ongoing  2.  Pt to report pain <2/10 at worst to allow improved function and QOL  Status at last Eval: 2/10  Current Status: -0-1/10 (5/30/24)  Goal Met?  -MET  3. Pt to demonstrate 120 deg of left shoulder flexion to allow improved reaching into cabinets  Status at last Eval: 110 deg AROM  Current Status: -110 deg (5/30/24)  Goal Met?  -No change     Long Term Goals: To be accomplished in 4 weeks  Pt to improve FOTO score to 65/100 indicating improved function in daily tasks  Status at last Eval: 51/100  Current Status: -  Goal Met?  -  2.  Pt to indicate a Global Rating Of Change (GROC) of 4+ indicating \"moderately better\"  Status at last Eval: n/a  Current Status: -  Goal Met?  -  3. Pt to improve Strength of the LEFT shoulder: Flexion 17 lbs, ABD 15 lbs to improve QOL  Status at last Eval: Flexion 12 lbs, ABD 10 lbs  Current Status: -  Goal Met? -    Next PN / RC due: 6/1/24  Auth due: med nec 12/31/24    PLAN  YES Continue plan of care  [x]  Upgrade activities as tolerated  []  Discharge due to :  []  Other:    Aniket Romero, PT DPT, OCS, Cert DN

## 2024-06-03 ENCOUNTER — HOSPITAL ENCOUNTER (OUTPATIENT)
Facility: HOSPITAL | Age: 79
Setting detail: RECURRING SERIES
Discharge: HOME | End: 2024-06-06
Payer: MEDICARE

## 2024-06-03 PROCEDURE — 97110 THERAPEUTIC EXERCISES: CPT

## 2024-06-03 PROCEDURE — 97530 THERAPEUTIC ACTIVITIES: CPT

## 2024-06-03 NOTE — THERAPY DISCHARGE
[facility]  [address], [address], [place] [address] - Ph: [phone]  Fx: [phone]  DISCHARGE SUMMARY     Patient Name: Radha Jordan : 1945   Medical   Diagnosis: Left shoulder pain [M25.512] Treatment Diagnosis: M25.512  LEFT SHOULDER PAIN    Onset Date: 3/26/24 DOS       Referral Source: Yonathan Meadows* Start of Care (SOC): 2024   Prior Hospitalization: See medical history Provider #: [id number]   Prior Level of Function: Pain and dysfunction, loss of range and weakness in both UEs   Comorbidities: Arthritis, chronic back pain, scoliosis, GI disease, HTN, osteoporosis, Right shoulder pain/fracture   Visits: 5, Missed: 0    Subjective: Pt reports she can do what she wants to do with the left shoulder. Sometimes frustrating crossing over the body. She can reach her head. No problem with sleep.     CURRENT STATUS  Pt seen for 5 visits for post op rehab s/p Left rTSA (DOS 3/26/24). Pt self reports she is happy with her current ability and function. She has no pain and good function for her left arm. Her remaining deficits include weakness and loss of ROM which pt is ok with. Based on pt presentation and current status I recommend pt DC to home and self management. Both parties agreed to DC plan, all questions answered.     GOALS:  Short Term Goals: To be accomplished in 3 weeks  Pt to report adherence and demonstrate compliance with basic HEP to allow progress between visits  Status at last Eval: Initiated  Current Status: -Compliant to date (6/3/24)  Goal Met?  -MET / Ongoing  2.  Pt to report pain <2/10 at worst to allow improved function and QOL  Status at last Eval: 2/10  Current Status: -0-1/10 (6/3/24)  Goal Met?  -MET  3. Pt to demonstrate 120 deg of left shoulder flexion to allow improved reaching into cabinets  Status at last Eval: 110 deg AROM  Current Status: -Left shoulder 110 deg (6/3/24)  Goal Met?  -No

## 2024-06-03 NOTE — PROGRESS NOTES
PHYSICAL THERAPY - DAILY TREATMENT NOTE    Patient Name: Radha Jordan    Date: 6/3/2024    : 1945  Insurance: Payor: MEDICARE / Plan: MEDICARE PART A AND B / Product Type: *No Product type* /      Patient  verified YES   Visit #   Current / Total 5 4-8   Time   In / Out 2:25 2:55   Pain   In / Out 0 0   Subjective Functional Status/Changes:   Pt reports she can do what she wants to do with the left shoulder. Sometimes frustrating crossing over the body. She can reach her head. No problem with sleep.       TREATMENT AREA =  Left shoulder pain [M25.512]    OBJECTIVE    Therapeutic Procedures:  Tx Min Billable or 1:1 Min (if diff from Tx Min) Procedure, Rationale, Specifics   22  57578 Therapeutic Exercise (timed):  increase ROM, strength, coordination, balance, and proprioception to improve patient's ability to progress to PLOF and address remaining functional goals. (see flow sheet as applicable)     Details if applicable:       8  20203 Therapeutic Activity (timed):  use of dynamic activities replicating functional movements to increase ROM, strength, coordination, balance, and proprioception in order to improve patient's ability to progress to PLOF and address remaining functional goals.  (see flow sheet as applicable)     Details if applicable:            Details if applicable:            Details if applicable:            Details if applicable:     30  [facility] Totals Reminder: bill using total billable min of TIMED therapeutic procedures (example: do not include dry needle or estim unattended, both untimed codes, in totals to left)  8-22 min = 1 unit; 23-37 min = 2 units; 38-52 min = 3 units; 53-67 min = 4 units; 68-82 min = 5 units   Total Total     [x]  Patient Education billed concurrently with other procedures   [x] Review HEP    [] Progressed/Changed HEP, detail:    [] Other detail:       Objective Information/Functional Measures/Assessment    Pt seen for 5 visits for post op rehab s/p Left rTSA